# Patient Record
Sex: FEMALE | Race: WHITE | NOT HISPANIC OR LATINO | Employment: OTHER | URBAN - METROPOLITAN AREA
[De-identification: names, ages, dates, MRNs, and addresses within clinical notes are randomized per-mention and may not be internally consistent; named-entity substitution may affect disease eponyms.]

---

## 2018-05-30 ENCOUNTER — HOSPITAL ENCOUNTER (EMERGENCY)
Facility: MEDICAL CENTER | Age: 83
End: 2018-05-30
Attending: EMERGENCY MEDICINE
Payer: COMMERCIAL

## 2018-05-30 VITALS
TEMPERATURE: 98.6 F | SYSTOLIC BLOOD PRESSURE: 154 MMHG | DIASTOLIC BLOOD PRESSURE: 87 MMHG | HEART RATE: 82 BPM | WEIGHT: 97 LBS | BODY MASS INDEX: 17.85 KG/M2 | HEIGHT: 62 IN | OXYGEN SATURATION: 99 % | RESPIRATION RATE: 18 BRPM

## 2018-05-30 DIAGNOSIS — J02.9 PHARYNGITIS, UNSPECIFIED ETIOLOGY: ICD-10-CM

## 2018-05-30 PROCEDURE — 99283 EMERGENCY DEPT VISIT LOW MDM: CPT

## 2018-05-30 RX ORDER — PENICILLIN V POTASSIUM 500 MG/1
500 TABLET ORAL 3 TIMES DAILY
Qty: 30 TAB | Refills: 0 | Status: SHIPPED
Start: 2018-05-30 | End: 2018-10-20 | Stop reason: CLARIF

## 2018-05-30 ASSESSMENT — PAIN SCALES - GENERAL
PAINLEVEL_OUTOF10: 5
PAINLEVEL_OUTOF10: 0

## 2018-05-30 ASSESSMENT — PAIN SCALES - WONG BAKER: WONGBAKER_NUMERICALRESPONSE: DOESN'T HURT AT ALL

## 2018-05-31 NOTE — DISCHARGE INSTRUCTIONS
Pharyngitis  Pharyngitis is redness, pain, and swelling (inflammation) of your pharynx.  What are the causes?  Pharyngitis is usually caused by infection. Most of the time, these infections are from viruses (viral) and are part of a cold. However, sometimes pharyngitis is caused by bacteria (bacterial). Pharyngitis can also be caused by allergies. Viral pharyngitis may be spread from person to person by coughing, sneezing, and personal items or utensils (cups, forks, spoons, toothbrushes). Bacterial pharyngitis may be spread from person to person by more intimate contact, such as kissing.  What are the signs or symptoms?  Symptoms of pharyngitis include:  · Sore throat.  · Tiredness (fatigue).  · Low-grade fever.  · Headache.  · Joint pain and muscle aches.  · Skin rashes.  · Swollen lymph nodes.  · Plaque-like film on throat or tonsils (often seen with bacterial pharyngitis).  How is this diagnosed?  Your health care provider will ask you questions about your illness and your symptoms. Your medical history, along with a physical exam, is often all that is needed to diagnose pharyngitis. Sometimes, a rapid strep test is done. Other lab tests may also be done, depending on the suspected cause.  How is this treated?  Viral pharyngitis will usually get better in 3-4 days without the use of medicine. Bacterial pharyngitis is treated with medicines that kill germs (antibiotics).  Follow these instructions at home:  · Drink enough water and fluids to keep your urine clear or pale yellow.  · Only take over-the-counter or prescription medicines as directed by your health care provider:  ¨ If you are prescribed antibiotics, make sure you finish them even if you start to feel better.  ¨ Do not take aspirin.  · Get lots of rest.  · Gargle with 8 oz of salt water (½ tsp of salt per 1 qt of water) as often as every 1-2 hours to soothe your throat.  · Throat lozenges (if you are not at risk for choking) or sprays may be used to  soothe your throat.  Contact a health care provider if:  · You have large, tender lumps in your neck.  · You have a rash.  · You cough up green, yellow-brown, or bloody spit.  Get help right away if:  · Your neck becomes stiff.  · You drool or are unable to swallow liquids.  · You vomit or are unable to keep medicines or liquids down.  · You have severe pain that does not go away with the use of recommended medicines.  · You have trouble breathing (not caused by a stuffy nose).  This information is not intended to replace advice given to you by your health care provider. Make sure you discuss any questions you have with your health care provider.  Document Released: 12/18/2006 Document Revised: 05/25/2017 Document Reviewed: 08/25/2014  [employer] Interactive Patient Education © 2017 [employer]

## 2018-05-31 NOTE — ED PROVIDER NOTES
"ED Provider Note    CHIEF COMPLAINT  Chief Complaint   Patient presents with   • Sore Throat     Throat pain x 1 hour.  Pt describes \"tasting poison when she swallows\"  Airway is patent, negative for swelling, foreign objects.  Pt is afebrile       HPI  Rosalba Wagner is a 89 y.o. female here for evaluation of a sore throat.  The pt states it happened approximately three hours ago. The pt has no fever, and no issues with swallowing.  She is able to eat/drink as per the usual.  She has no ill contacts.  No ear pain.  She has not taken anything for the pain or discomfort.  She denies any history of the same.  No cp, no sob, no abdominal pain, and no other medical concerns.     PAST MEDICAL HISTORY   has a past medical history of Chest pain, atypical (8/21/2012); Left arm numbness (8/21/2012); OSTEOPOROSIS (8/21/2012); and Syncope (8/21/2012).    SOCIAL HISTORY  Social History     Social History Main Topics   • Smoking status: Former Smoker     Quit date: 9/11/1984   • Smokeless tobacco: Never Used   • Alcohol use No   • Drug use: No   • Sexual activity: Not on file       SURGICAL HISTORY  patient denies any surgical history    CURRENT MEDICATIONS  Home Medications     Reviewed by Marry Baron R.N. (Registered Nurse) on 05/30/18 at 2131  Med List Status: Not Addressed   Medication Last Dose Status   aspirin EC (ECOTRIN) 81 MG TBEC Taking Active   Cholecalciferol (VITAMIN D) 2000 UNITS CAPS  Active   potassium chloride (MICRO-K) 10 MEQ capsule  Active                ALLERGIES  No Known Allergies    REVIEW OF SYSTEMS  See HPI for further details. Review of systems as above, otherwise all other systems are negative.     PHYSICAL EXAM  VITAL SIGNS: BP (!) 167/115   Pulse 78   Temp 36.9 °C (98.5 °F)   Resp 18   Ht 1.575 m (5' 2\")   Wt 44 kg (97 lb)   SpO2 94%   BMI 17.74 kg/m²     Constitutional: Well developed, well nourished. No acute distress.  HEENT: Normocephalic, atraumatic. MMM.  Bilateral TM clear. " Posterior pharynx clear and without exudate, uvula midline,   Neck: Supple, Full range of motion, no anterior LAD.  Chest/Pulmonary:  No respiratory distress.  Equal expansion   Musculoskeletal: No deformity, no edema, neurovascular intact.   Neuro: Clear speech, appropriate, cooperative, cranial nerves II-XII grossly intact.  Psych: Normal mood and affect      PROCEDURES     MEDICAL RECORD  I have reviewed patient's medical record and pertinent results are listed above.    COURSE & MEDICAL DECISION MAKING  I have reviewed any medical record information, laboratory studies and radiographic results as noted above.    I you have had any blood pressure issues while here in the emergency department, please see your doctor for a further evaluation or work up.    The pt is nontoxic appearing and comfortable at this time.  I do not see a reason to do anything at this time, but she will be given prescription for penicillin in case she develops any exudate in the coming days.  She is here with family, and will return here for any other issues.     Differential diagnoses include but not limited to: strep pharyngitis, pta, om,     This patient presents with pharyngitis  .  At this time, I have counseled the patient/family regarding their medications, pain control, and follow up.  They will continue their medications, if any, as prescribed.  They will return immediately for any worsening symptoms and/or any other medical concerns.  They will see their doctor, or contact the doctor provided, in 1-2 days for follow up.       FINAL IMPRESSION  1. Pharyngitis, unspecified etiology            Electronically signed by: Justin Crawley, 5/30/2018 9:40 PM

## 2018-05-31 NOTE — ED NOTES
PT IS AAOX4, PT IS IN NAD, PT IS BEING D/C, PT WAS GIVEN D/C INSTRUCTIONS AND SHE STATED UNDERSTANDING. PT IS ABLE TO AMB WOI ASSISTANCE. PT AIRWAY IS CLEAR. NO SOB. PT LEFT WITH FAMILY.

## 2018-05-31 NOTE — ED TRIAGE NOTES
"Rosalba Wagner  89 y.o. female  Chief Complaint   Patient presents with   • Sore Throat     Throat pain x 1 hour.  Pt describes \"tasting poison when she swallows\"  Airway is patent, negative for swelling, foreign objects.  Pt is afebrile       Pt amb to triage with steady gait for above complaint.  Pt is not in any respiratory distress, states the pain started while she was trying to sleep.  Pt also denies having fevers or chills at home, or recent sinus or throat infections.  \"I think I just need something to help me sleep\"  Pt is alert and oriented, speaking in full sentences, follows commands and responds appropriately to questions. NAD. Resp are even and unlabored.  Pt placed in lobby. Pt educated on triage process. Pt encouraged to alert staff for any changes.    BP (!) 167/115   Pulse 78   Temp 36.9 °C (98.5 °F)   Resp 18   Ht 1.575 m (5' 2\")   Wt 44 kg (97 lb)   SpO2 94%   BMI 17.74 kg/m²     "

## 2018-10-20 ENCOUNTER — APPOINTMENT (OUTPATIENT)
Dept: RADIOLOGY | Facility: MEDICAL CENTER | Age: 83
End: 2018-10-20
Attending: EMERGENCY MEDICINE
Payer: COMMERCIAL

## 2018-10-20 ENCOUNTER — HOSPITAL ENCOUNTER (OUTPATIENT)
Facility: MEDICAL CENTER | Age: 83
End: 2018-10-21
Attending: EMERGENCY MEDICINE | Admitting: HOSPITALIST
Payer: COMMERCIAL

## 2018-10-20 DIAGNOSIS — I48.91 ATRIAL FIBRILLATION, UNSPECIFIED TYPE (HCC): ICD-10-CM

## 2018-10-20 DIAGNOSIS — J02.9 SORE THROAT: ICD-10-CM

## 2018-10-20 PROBLEM — F02.80 LATE ONSET ALZHEIMER'S DISEASE WITHOUT BEHAVIORAL DISTURBANCE (HCC): Status: ACTIVE | Noted: 2018-10-20

## 2018-10-20 PROBLEM — G30.1 LATE ONSET ALZHEIMER'S DISEASE WITHOUT BEHAVIORAL DISTURBANCE (HCC): Status: ACTIVE | Noted: 2018-10-20

## 2018-10-20 PROBLEM — I48.0 PAROXYSMAL ATRIAL FIBRILLATION (HCC): Status: ACTIVE | Noted: 2018-10-20

## 2018-10-20 LAB
ANION GAP SERPL CALC-SCNC: 5 MMOL/L (ref 0–11.9)
BASOPHILS # BLD AUTO: 0.5 % (ref 0–1.8)
BASOPHILS # BLD: 0.04 K/UL (ref 0–0.12)
BNP SERPL-MCNC: 114 PG/ML (ref 0–100)
BUN SERPL-MCNC: 26 MG/DL (ref 8–22)
CA-I SERPL-SCNC: 1.2 MMOL/L (ref 1.1–1.3)
CALCIUM SERPL-MCNC: 9.6 MG/DL (ref 8.5–10.5)
CHLORIDE SERPL-SCNC: 103 MMOL/L (ref 96–112)
CO2 SERPL-SCNC: 28 MMOL/L (ref 20–33)
CREAT SERPL-MCNC: 0.95 MG/DL (ref 0.5–1.4)
EOSINOPHIL # BLD AUTO: 0.18 K/UL (ref 0–0.51)
EOSINOPHIL NFR BLD: 2.3 % (ref 0–6.9)
ERYTHROCYTE [DISTWIDTH] IN BLOOD BY AUTOMATED COUNT: 42.3 FL (ref 35.9–50)
GLUCOSE SERPL-MCNC: 112 MG/DL (ref 65–99)
HCT VFR BLD AUTO: 37.7 % (ref 37–47)
HGB BLD-MCNC: 12.5 G/DL (ref 12–16)
IMM GRANULOCYTES # BLD AUTO: 0.03 K/UL (ref 0–0.11)
IMM GRANULOCYTES NFR BLD AUTO: 0.4 % (ref 0–0.9)
LYMPHOCYTES # BLD AUTO: 1.79 K/UL (ref 1–4.8)
LYMPHOCYTES NFR BLD: 22.5 % (ref 22–41)
MAGNESIUM SERPL-MCNC: 2.3 MG/DL (ref 1.5–2.5)
MCH RBC QN AUTO: 30.9 PG (ref 27–33)
MCHC RBC AUTO-ENTMCNC: 33.2 G/DL (ref 33.6–35)
MCV RBC AUTO: 93.1 FL (ref 81.4–97.8)
MONOCYTES # BLD AUTO: 0.76 K/UL (ref 0–0.85)
MONOCYTES NFR BLD AUTO: 9.6 % (ref 0–13.4)
NEUTROPHILS # BLD AUTO: 5.15 K/UL (ref 2–7.15)
NEUTROPHILS NFR BLD: 64.7 % (ref 44–72)
NRBC # BLD AUTO: 0 K/UL
NRBC BLD-RTO: 0 /100 WBC
PLATELET # BLD AUTO: 170 K/UL (ref 164–446)
PMV BLD AUTO: 9.9 FL (ref 9–12.9)
POTASSIUM SERPL-SCNC: 4.4 MMOL/L (ref 3.6–5.5)
RBC # BLD AUTO: 4.05 M/UL (ref 4.2–5.4)
S PYO AG THROAT QL: NORMAL
SIGNIFICANT IND 70042: NORMAL
SITE SITE: NORMAL
SODIUM SERPL-SCNC: 136 MMOL/L (ref 135–145)
SOURCE SOURCE: NORMAL
T4 FREE SERPL-MCNC: 0.89 NG/DL (ref 0.53–1.43)
TROPONIN I SERPL-MCNC: 0.01 NG/ML (ref 0–0.04)
TSH SERPL DL<=0.005 MIU/L-ACNC: 2.79 UIU/ML (ref 0.38–5.33)
WBC # BLD AUTO: 8 K/UL (ref 4.8–10.8)

## 2018-10-20 PROCEDURE — 87880 STREP A ASSAY W/OPTIC: CPT

## 2018-10-20 PROCEDURE — 99285 EMERGENCY DEPT VISIT HI MDM: CPT

## 2018-10-20 PROCEDURE — 700105 HCHG RX REV CODE 258: Performed by: HOSPITALIST

## 2018-10-20 PROCEDURE — 84443 ASSAY THYROID STIM HORMONE: CPT

## 2018-10-20 PROCEDURE — 83880 ASSAY OF NATRIURETIC PEPTIDE: CPT

## 2018-10-20 PROCEDURE — 83735 ASSAY OF MAGNESIUM: CPT

## 2018-10-20 PROCEDURE — 87081 CULTURE SCREEN ONLY: CPT

## 2018-10-20 PROCEDURE — A9270 NON-COVERED ITEM OR SERVICE: HCPCS | Performed by: EMERGENCY MEDICINE

## 2018-10-20 PROCEDURE — G0378 HOSPITAL OBSERVATION PER HR: HCPCS

## 2018-10-20 PROCEDURE — 85025 COMPLETE CBC W/AUTO DIFF WBC: CPT

## 2018-10-20 PROCEDURE — 93005 ELECTROCARDIOGRAM TRACING: CPT | Performed by: EMERGENCY MEDICINE

## 2018-10-20 PROCEDURE — 84439 ASSAY OF FREE THYROXINE: CPT

## 2018-10-20 PROCEDURE — 80048 BASIC METABOLIC PNL TOTAL CA: CPT

## 2018-10-20 PROCEDURE — 700102 HCHG RX REV CODE 250 W/ 637 OVERRIDE(OP): Performed by: EMERGENCY MEDICINE

## 2018-10-20 PROCEDURE — 82330 ASSAY OF CALCIUM: CPT

## 2018-10-20 PROCEDURE — 71046 X-RAY EXAM CHEST 2 VIEWS: CPT

## 2018-10-20 PROCEDURE — 99220 PR INITIAL OBSERVATION CARE,LEVL III: CPT | Performed by: HOSPITALIST

## 2018-10-20 PROCEDURE — 36415 COLL VENOUS BLD VENIPUNCTURE: CPT

## 2018-10-20 PROCEDURE — 84484 ASSAY OF TROPONIN QUANT: CPT

## 2018-10-20 RX ORDER — POLYETHYLENE GLYCOL 3350 17 G/17G
1 POWDER, FOR SOLUTION ORAL
Status: DISCONTINUED | OUTPATIENT
Start: 2018-10-20 | End: 2018-10-21 | Stop reason: HOSPADM

## 2018-10-20 RX ORDER — ONDANSETRON 2 MG/ML
4 INJECTION INTRAMUSCULAR; INTRAVENOUS EVERY 4 HOURS PRN
Status: DISCONTINUED | OUTPATIENT
Start: 2018-10-20 | End: 2018-10-21 | Stop reason: HOSPADM

## 2018-10-20 RX ORDER — SODIUM CHLORIDE 9 MG/ML
INJECTION, SOLUTION INTRAVENOUS
Status: ACTIVE
Start: 2018-10-20 | End: 2018-10-21

## 2018-10-20 RX ORDER — ONDANSETRON 4 MG/1
4 TABLET, ORALLY DISINTEGRATING ORAL EVERY 4 HOURS PRN
Status: DISCONTINUED | OUTPATIENT
Start: 2018-10-20 | End: 2018-10-21 | Stop reason: HOSPADM

## 2018-10-20 RX ORDER — ACETAMINOPHEN 160 MG/5ML
650 SUSPENSION ORAL ONCE
Status: COMPLETED | OUTPATIENT
Start: 2018-10-20 | End: 2018-10-20

## 2018-10-20 RX ORDER — BISACODYL 10 MG
10 SUPPOSITORY, RECTAL RECTAL
Status: DISCONTINUED | OUTPATIENT
Start: 2018-10-20 | End: 2018-10-21 | Stop reason: HOSPADM

## 2018-10-20 RX ORDER — ACETAMINOPHEN 325 MG/1
650 TABLET ORAL EVERY 6 HOURS PRN
Status: DISCONTINUED | OUTPATIENT
Start: 2018-10-20 | End: 2018-10-21 | Stop reason: HOSPADM

## 2018-10-20 RX ORDER — M-VIT,TX,IRON,MINS/CALC/FOLIC 27MG-0.4MG
1 TABLET ORAL DAILY
COMMUNITY
End: 2019-03-25 | Stop reason: CLARIF

## 2018-10-20 RX ORDER — SODIUM CHLORIDE 9 MG/ML
INJECTION, SOLUTION INTRAVENOUS CONTINUOUS
Status: DISCONTINUED | OUTPATIENT
Start: 2018-10-20 | End: 2018-10-21 | Stop reason: HOSPADM

## 2018-10-20 RX ORDER — AMOXICILLIN 250 MG
2 CAPSULE ORAL 2 TIMES DAILY
Status: DISCONTINUED | OUTPATIENT
Start: 2018-10-21 | End: 2018-10-21 | Stop reason: HOSPADM

## 2018-10-20 RX ADMIN — SODIUM CHLORIDE: 9 INJECTION, SOLUTION INTRAVENOUS at 23:14

## 2018-10-20 RX ADMIN — ACETAMINOPHEN 650 MG: 160 SUSPENSION ORAL at 18:41

## 2018-10-20 ASSESSMENT — COGNITIVE AND FUNCTIONAL STATUS - GENERAL
DAILY ACTIVITIY SCORE: 23
TURNING FROM BACK TO SIDE WHILE IN FLAT BAD: A LITTLE
MOVING TO AND FROM BED TO CHAIR: A LITTLE
WALKING IN HOSPITAL ROOM: A LITTLE
CLIMB 3 TO 5 STEPS WITH RAILING: A LITTLE
SUGGESTED CMS G CODE MODIFIER DAILY ACTIVITY: CI
TOILETING: A LITTLE
SUGGESTED CMS G CODE MODIFIER MOBILITY: CK
MOVING FROM LYING ON BACK TO SITTING ON SIDE OF FLAT BED: A LITTLE
MOBILITY SCORE: 19

## 2018-10-20 ASSESSMENT — LIFESTYLE VARIABLES
EVER_SMOKED: YES
ALCOHOL_USE: NO

## 2018-10-20 ASSESSMENT — PATIENT HEALTH QUESTIONNAIRE - PHQ9
7. TROUBLE CONCENTRATING ON THINGS, SUCH AS READING THE NEWSPAPER OR WATCHING TELEVISION: SEVERAL DAYS
1. LITTLE INTEREST OR PLEASURE IN DOING THINGS: NOT AT ALL
2. FEELING DOWN, DEPRESSED, IRRITABLE, OR HOPELESS: SEVERAL DAYS
5. POOR APPETITE OR OVEREATING: NOT AT ALL
4. FEELING TIRED OR HAVING LITTLE ENERGY: NOT AT ALL
3. TROUBLE FALLING OR STAYING ASLEEP OR SLEEPING TOO MUCH: SEVERAL DAYS
SUM OF ALL RESPONSES TO PHQ QUESTIONS 1-9: 4
8. MOVING OR SPEAKING SO SLOWLY THAT OTHER PEOPLE COULD HAVE NOTICED. OR THE OPPOSITE, BEING SO FIGETY OR RESTLESS THAT YOU HAVE BEEN MOVING AROUND A LOT MORE THAN USUAL: NOT AT ALL
6. FEELING BAD ABOUT YOURSELF - OR THAT YOU ARE A FAILURE OR HAVE LET YOURSELF OR YOUR FAMILY DOWN: SEVERAL DAYS
SUM OF ALL RESPONSES TO PHQ9 QUESTIONS 1 AND 2: 1
9. THOUGHTS THAT YOU WOULD BE BETTER OFF DEAD, OR OF HURTING YOURSELF: NOT AT ALL

## 2018-10-20 ASSESSMENT — ENCOUNTER SYMPTOMS
RESPIRATORY NEGATIVE: 1
MUSCULOSKELETAL NEGATIVE: 1
CARDIOVASCULAR NEGATIVE: 1
PSYCHIATRIC NEGATIVE: 1
NEUROLOGICAL NEGATIVE: 1
GASTROINTESTINAL NEGATIVE: 1
CONSTITUTIONAL NEGATIVE: 1
EYES NEGATIVE: 1

## 2018-10-20 ASSESSMENT — COPD QUESTIONNAIRES
DO YOU EVER COUGH UP ANY MUCUS OR PHLEGM?: NO/ONLY WITH OCCASIONAL COLDS OR INFECTIONS
COPD SCREENING SCORE: 4
IN THE PAST 12 MONTHS DO YOU DO LESS THAN YOU USED TO BECAUSE OF YOUR BREATHING PROBLEMS: AGREE
HAVE YOU SMOKED AT LEAST 100 CIGARETTES IN YOUR ENTIRE LIFE: NO/DON'T KNOW
DURING THE PAST 4 WEEKS HOW MUCH DID YOU FEEL SHORT OF BREATH: SOME OF THE TIME

## 2018-10-20 ASSESSMENT — PAIN SCALES - GENERAL: PAINLEVEL_OUTOF10: 4

## 2018-10-21 VITALS
WEIGHT: 98.55 LBS | RESPIRATION RATE: 18 BRPM | HEIGHT: 62 IN | SYSTOLIC BLOOD PRESSURE: 162 MMHG | DIASTOLIC BLOOD PRESSURE: 69 MMHG | TEMPERATURE: 97.6 F | OXYGEN SATURATION: 98 % | HEART RATE: 76 BPM | BODY MASS INDEX: 18.13 KG/M2

## 2018-10-21 PROBLEM — Z66 DNR (DO NOT RESUSCITATE): Status: ACTIVE | Noted: 2018-10-21

## 2018-10-21 LAB
ANION GAP SERPL CALC-SCNC: 6 MMOL/L (ref 0–11.9)
BASOPHILS # BLD AUTO: 0.8 % (ref 0–1.8)
BASOPHILS # BLD: 0.05 K/UL (ref 0–0.12)
BUN SERPL-MCNC: 22 MG/DL (ref 8–22)
CALCIUM SERPL-MCNC: 9.2 MG/DL (ref 8.5–10.5)
CHLORIDE SERPL-SCNC: 108 MMOL/L (ref 96–112)
CO2 SERPL-SCNC: 27 MMOL/L (ref 20–33)
CREAT SERPL-MCNC: 0.77 MG/DL (ref 0.5–1.4)
EOSINOPHIL # BLD AUTO: 0.23 K/UL (ref 0–0.51)
EOSINOPHIL NFR BLD: 3.9 % (ref 0–6.9)
ERYTHROCYTE [DISTWIDTH] IN BLOOD BY AUTOMATED COUNT: 40.9 FL (ref 35.9–50)
GLUCOSE SERPL-MCNC: 96 MG/DL (ref 65–99)
HCT VFR BLD AUTO: 37 % (ref 37–47)
HGB BLD-MCNC: 12.6 G/DL (ref 12–16)
IMM GRANULOCYTES # BLD AUTO: 0.02 K/UL (ref 0–0.11)
IMM GRANULOCYTES NFR BLD AUTO: 0.3 % (ref 0–0.9)
LYMPHOCYTES # BLD AUTO: 1.75 K/UL (ref 1–4.8)
LYMPHOCYTES NFR BLD: 29.4 % (ref 22–41)
MCH RBC QN AUTO: 31.3 PG (ref 27–33)
MCHC RBC AUTO-ENTMCNC: 34.1 G/DL (ref 33.6–35)
MCV RBC AUTO: 91.8 FL (ref 81.4–97.8)
MONOCYTES # BLD AUTO: 0.68 K/UL (ref 0–0.85)
MONOCYTES NFR BLD AUTO: 11.4 % (ref 0–13.4)
NEUTROPHILS # BLD AUTO: 3.22 K/UL (ref 2–7.15)
NEUTROPHILS NFR BLD: 54.2 % (ref 44–72)
NRBC # BLD AUTO: 0 K/UL
NRBC BLD-RTO: 0 /100 WBC
PLATELET # BLD AUTO: 153 K/UL (ref 164–446)
PMV BLD AUTO: 9.5 FL (ref 9–12.9)
POTASSIUM SERPL-SCNC: 4.3 MMOL/L (ref 3.6–5.5)
RBC # BLD AUTO: 4.03 M/UL (ref 4.2–5.4)
SODIUM SERPL-SCNC: 141 MMOL/L (ref 135–145)
TROPONIN I SERPL-MCNC: 0.01 NG/ML (ref 0–0.04)
WBC # BLD AUTO: 6 K/UL (ref 4.8–10.8)

## 2018-10-21 PROCEDURE — G0378 HOSPITAL OBSERVATION PER HR: HCPCS

## 2018-10-21 PROCEDURE — 85025 COMPLETE CBC W/AUTO DIFF WBC: CPT

## 2018-10-21 PROCEDURE — 36415 COLL VENOUS BLD VENIPUNCTURE: CPT

## 2018-10-21 PROCEDURE — 99217 PR OBSERVATION CARE DISCHARGE: CPT | Mod: 25 | Performed by: INTERNAL MEDICINE

## 2018-10-21 PROCEDURE — 700102 HCHG RX REV CODE 250 W/ 637 OVERRIDE(OP): Performed by: HOSPITALIST

## 2018-10-21 PROCEDURE — 84484 ASSAY OF TROPONIN QUANT: CPT

## 2018-10-21 PROCEDURE — 700111 HCHG RX REV CODE 636 W/ 250 OVERRIDE (IP): Performed by: HOSPITALIST

## 2018-10-21 PROCEDURE — 99497 ADVNCD CARE PLAN 30 MIN: CPT | Performed by: INTERNAL MEDICINE

## 2018-10-21 PROCEDURE — 96372 THER/PROPH/DIAG INJ SC/IM: CPT

## 2018-10-21 PROCEDURE — 80048 BASIC METABOLIC PNL TOTAL CA: CPT

## 2018-10-21 PROCEDURE — A9270 NON-COVERED ITEM OR SERVICE: HCPCS | Performed by: HOSPITALIST

## 2018-10-21 RX ORDER — HALOPERIDOL 0.5 MG/1
0.5 TABLET ORAL 2 TIMES DAILY PRN
Status: DISCONTINUED | OUTPATIENT
Start: 2018-10-21 | End: 2018-10-21 | Stop reason: HOSPADM

## 2018-10-21 RX ADMIN — SENNOSIDES AND DOCUSATE SODIUM 2 TABLET: 8.6; 5 TABLET ORAL at 05:24

## 2018-10-21 RX ADMIN — ENOXAPARIN SODIUM 30 MG: 100 INJECTION SUBCUTANEOUS at 05:24

## 2018-10-21 ASSESSMENT — PAIN SCALES - GENERAL
PAINLEVEL_OUTOF10: 4
PAINLEVEL_OUTOF10: 0
PAINLEVEL_OUTOF10: 0

## 2018-10-21 NOTE — CARE PLAN
Problem: Communication  Goal: The ability to communicate needs accurately and effectively will improve  Outcome: PROGRESSING AS EXPECTED  Pt educated on POC and medications. Pt verbalized understanding.     Problem: Safety  Goal: Will remain free from injury  Outcome: PROGRESSING AS EXPECTED  Bedside table and call light are within reach. Bed is locked and in the lowest position. Treaded socks are on. Patient educated to call for assistance.  Bed & strip alarm on.

## 2018-10-21 NOTE — CARE PLAN
Problem: Knowledge Deficit  Goal: Knowledge of disease process/condition, treatment plan, diagnostic tests, and medications will improve  Outcome: PROGRESSING AS EXPECTED    Intervention: Explain information regarding disease process/condition, treatment plan, diagnostic tests, and medications and document in education  Discussed POC and upcoming diagnostic tests with pt. Asked appropriate questions. Verbalized understanding      Problem: Pain Management  Goal: Pain level will decrease to patient's comfort goal  Pt currently at comfort function goal. Pt not currently experiencing any acute discomfort.

## 2018-10-21 NOTE — PROGRESS NOTES
Patient discharged home with friend. All personal belongings collected. IV access removed. Monitor removed, monitor room notified. Discharge instructions discussed. Medications reviewed. Follow up appointments to be scheduled by patient. Patient ambulated off unit with 4-wheel walker, all personal belongings, and friend with escourt without incident.

## 2018-10-21 NOTE — PROGRESS NOTES
2 RN skin assessment with TERRY Bradshaw:  Pt has some general bruising on left arm. BL feet are dry. Otherwise, skin is intact.

## 2018-10-21 NOTE — H&P
Hospital Medicine History & Physical Note    Date of Service  10/20/2018    Primary Care Physician  Pcp Pt States None    Consultants  None    Code Status  Full code     Chief Complaint  Sore throat    History of Presenting Illness  89 y.o. female with apparently no prior medical history, but with evidence of dementia, without behavioral disturbance, was in her usual state of health until a few days prior to admission.  She began to have a sore throat.  This was not associated with any fever or chills, with no exacerbating or alleviating factors.  She subsequently presented to evaluate the cause of her sore throat.  On routine monitoring, she was noted to have an irregular heartbeat.  She currently denies any chest pain or shortness of breath, no fever or chills, no other complaints.    Review of Systems  Review of Systems   Constitutional: Negative.    HENT: Negative.    Eyes: Negative.    Respiratory: Negative.    Cardiovascular: Negative.    Gastrointestinal: Negative.    Genitourinary: Negative.    Musculoskeletal: Negative.    Skin: Negative.    Neurological: Negative.    Endo/Heme/Allergies: Negative.    Psychiatric/Behavioral: Negative.        Past Medical History   has a past medical history of Chest pain, atypical (8/21/2012); Dementia; Left arm numbness (8/21/2012); OSTEOPOROSIS (8/21/2012); and Syncope (8/21/2012).    Surgical History   has no past surgical history on file.     Family History  family history includes No Known Problems in her father and mother.     Social History   reports that she quit smoking about 34 years ago. She has never used smokeless tobacco. She reports that she does not drink alcohol or use drugs.    Allergies  No Known Allergies    Medications  Prior to Admission Medications   Prescriptions Last Dose Informant Patient Reported? Taking?   Cholecalciferol (VITAMIN D) 2000 UNITS CAPS   No No   Sig: Take 1 Cap by mouth 2 Times a Day.   aspirin EC (ECOTRIN) 81 MG TBEC not taking   Yes No   Sig: Take 81 mg by mouth every day.   penicillin v potassium (VEETID) 500 MG Tab   No No   Sig: Take 1 Tab by mouth 3 times a day.   potassium chloride (MICRO-K) 10 MEQ capsule   No No   Sig: Take 1 Cap by mouth 2 times a day.      Facility-Administered Medications: None       Physical Exam  Temp:  [36.7 °C (98.1 °F)] 36.7 °C (98.1 °F)  Pulse:  [] 76  Resp:  [16] 16  BP: (184)/(76) 184/76    Physical Exam   Constitutional: She appears well-developed and well-nourished. No distress.   HENT:   Head: Normocephalic and atraumatic.   Eyes: Pupils are equal, round, and reactive to light. Conjunctivae are normal.   Neck: Normal range of motion. Neck supple. No tracheal deviation present. No thyromegaly present.   What appears to be a prior thyroid surgery scar   Cardiovascular: Normal rate, regular rhythm and normal heart sounds.  Exam reveals no gallop and no friction rub.    No murmur heard.  Pulmonary/Chest: Effort normal and breath sounds normal. No respiratory distress. She has no wheezes. She has no rales.   Abdominal: Soft. Bowel sounds are normal. She exhibits no distension. There is no tenderness. There is no rebound.   Musculoskeletal: Normal range of motion. She exhibits no edema.   Lymphadenopathy:     She has no cervical adenopathy.   Neurological: She is alert. No cranial nerve deficit.   Skin: Skin is warm and dry. She is not diaphoretic.   Psychiatric: She has a normal mood and affect.   Nursing note and vitals reviewed.      Laboratory:  Recent Labs      10/20/18   1925   WBC  8.0   RBC  4.05*   HEMOGLOBIN  12.5   HEMATOCRIT  37.7   MCV  93.1   MCH  30.9   MCHC  33.2*   RDW  42.3   PLATELETCT  170   MPV  9.9     Recent Labs      10/20/18   1925   SODIUM  136   POTASSIUM  4.4   CHLORIDE  103   CO2  28   GLUCOSE  112*   BUN  26*   CREATININE  0.95   CALCIUM  9.6     Recent Labs      10/20/18   1925   GLUCOSE  112*         Recent Labs      10/20/18   1925   BNPBTYPENAT  114*         Recent Labs       10/20/18   1925   TROPONINI  0.01       Urinalysis:    No results found     Imaging:  DX-CHEST-2 VIEWS   Final Result      1.  COPD changes.      2.  Biapical pulmonary scarring.            Assessment/Plan:  I anticipate this patient is appropriate for observation status at this time.    * Paroxysmal atrial fibrillation (HCC)   Assessment & Plan    Unclear if previously diagnosed.  Patient and family member/friend not aware of diagnosis in past.  Currently rate is controlled.  As no other medical conditions, CHADS2 score currently 1.  May not need anticoagulation, ?ability to comply at any rate.  Check echocardiogram, TSH normal, will check FT4 level        Late onset Alzheimer's disease without behavioral disturbance   Assessment & Plan    Stable.  Memory deficits prominent.          Sore throat   Assessment & Plan    Likely viral.  Supportive care as needed.             VTE prophylaxis: SCD, lovenox

## 2018-10-21 NOTE — DISCHARGE PLANNING
Medical Social Work    Anticipated Discharge Disposition: Home     Action: LSW reviewed pt's chart and pt has not received any transportation assistance in the past.  LSW provided cab voucher (#[id number]) to get pt to her residence at [address] [place] [address].     Barriers: None     Plan: Pt to d/c home with cab voucher, no needs

## 2018-10-21 NOTE — ED PROVIDER NOTES
ED Provider Note    Scribed for Tomer Martin M.D. by Cheryl Maciel. 10/20/2018  6:21 PM    Means of arrival: Walk-in  History obtained from: Patient   History limited by: none    CHIEF COMPLAINT  Chief Complaint   Patient presents with   • Sore Throat     since 1700 today       [facility]    Rosalba Wagner is a 89 y.o. female presenting with a sore throat that began one hour ago. Patient's pain is exacerbated with swallowing, she does not have alleviating factors of her pain. Her pain is an 8 out of 10. She is able to drink liquids. Patient does not have a fever, cough chest pain, shortness of breath, vomiting, neck stiffness or diarrhea associated. She has not had sick contacts. Patient lives alone.     REVIEW OF SYSTEMS  See HPI for further details.   Pertinent positives include: sore throat  Pertinent negative include: fever, cough chest pain, shortness of breath, vomiting, neck stiffness or diarrhea  10 + review of systems otherwise negative   C.    PAST MEDICAL HISTORY   has a past medical history of Chest pain, atypical (8/21/2012); Left arm numbness (8/21/2012); OSTEOPOROSIS (8/21/2012); and Syncope (8/21/2012).    SOCIAL HISTORY  Social History     Social History Main Topics   • Smoking status: Former Smoker     Quit date: 9/11/1984   • Smokeless tobacco: Never Used   • Alcohol use No   • Drug use: No       SURGICAL HISTORY  patient denies any surgical history    CURRENT MEDICATIONS  Home Medications     Reviewed by Lilliam Ribera R.N. (Registered Nurse) on 10/20/18 at 1746  Med List Status: Partial   Medication Last Dose Status   aspirin EC (ECOTRIN) 81 MG TBEC not taking Active   Cholecalciferol (VITAMIN D) 2000 UNITS CAPS  Active   penicillin v potassium (VEETID) 500 MG Tab  Active   potassium chloride (MICRO-K) 10 MEQ capsule  Active                ALLERGIES  No Known Allergies    PHYSICAL EXAM  VITAL SIGNS: BP (!) 184/76   Pulse 79   Temp 36.7 °C (98.1 °F) (Temporal)   Resp 16    "Ht 1.575 m (5' 2\")   Wt 45.8 kg (100 lb 15.5 oz)   SpO2 93%   BMI 18.47 kg/m²    SpO2: I interpret this pulse oximetry as normal  Constitutional: Well developed, Well nourished, no distress, Non-toxic appearance.   HENT: Normocephalic, Atraumatic, Bilateral external ears normal, Oropharynx moist with no oral exudates, no tonsillar edema or erythema. uvula is midline. trachea is midline,   Eyes: PERRLA, EOMI, Conjunctiva normal, No discharge.   Neck: No tenderness, Supple, No stridor.   Lymphatic: No lymphadenopathy noted.   Cardiovascular: Normal heart rate, Irregular rhythm.  No peripheral edema, no calf tenderness or swelling.  Thorax & Lungs: Clear to auscultation bilaterally, No respiratory distress, No wheezing, No crackles.   Abdomen: Soft, No tenderness, No masses, No pulsatile masses.   Skin: Warm, Dry, No erythema, No rash.   Extremities:, No edema No cyanosis.   Musculoskeletal: No tenderness to palpation or major deformities noted.  Intact distal pulses  Neurologic: Awake, alert. Moves all extremities spontaneously.  Psychiatric: Affect normal, Judgment normal, Mood normal.     DIAGNOSTIC STUDIES / PROCEDURES    EKG  EKG (my interpretation): Irregular rhythm, rate 100, axis normal, no ST or T-wave abnormalities, atrial fibrillation, no prior EKGs immediately available for comparison.  Relevant clinical issues: Sore throat, irregular heart rhythm    LABORATORY  Results for orders placed or performed during the hospital encounter of 10/20/18   RAPID STREP, CULT IF INDICATED (CULTURE IF NEGATIVE)   Result Value Ref Range    Significant Indicator NEG     Source THRT     Site THROAT     Rapid Strep Screen       Negative for Group A streptococcus.  A negative result may be obtained if the specimen is  inadequate or antigen concentration is below the  sensitivity of the test. This negative test will be followed  up with a culture as requested.     CBC WITH DIFFERENTIAL   Result Value Ref Range    WBC 8.0 4.8 " - 10.8 K/uL    RBC 4.05 (L) 4.20 - 5.40 M/uL    Hemoglobin 12.5 12.0 - 16.0 g/dL    Hematocrit 37.7 37.0 - 47.0 %    MCV 93.1 81.4 - 97.8 fL    MCH 30.9 27.0 - 33.0 pg    MCHC 33.2 (L) 33.6 - 35.0 g/dL    RDW 42.3 35.9 - 50.0 fL    Platelet Count 170 164 - 446 K/uL    MPV 9.9 9.0 - 12.9 fL    Neutrophils-Polys 64.70 44.00 - 72.00 %    Lymphocytes 22.50 22.00 - 41.00 %    Monocytes 9.60 0.00 - 13.40 %    Eosinophils 2.30 0.00 - 6.90 %    Basophils 0.50 0.00 - 1.80 %    Immature Granulocytes 0.40 0.00 - 0.90 %    Nucleated RBC 0.00 /100 WBC    Neutrophils (Absolute) 5.15 2.00 - 7.15 K/uL    Lymphs (Absolute) 1.79 1.00 - 4.80 K/uL    Monos (Absolute) 0.76 0.00 - 0.85 K/uL    Eos (Absolute) 0.18 0.00 - 0.51 K/uL    Baso (Absolute) 0.04 0.00 - 0.12 K/uL    Immature Granulocytes (abs) 0.03 0.00 - 0.11 K/uL    NRBC (Absolute) 0.00 K/uL   BASIC METABOLIC PANEL   Result Value Ref Range    Sodium 136 135 - 145 mmol/L    Potassium 4.4 3.6 - 5.5 mmol/L    Chloride 103 96 - 112 mmol/L    Co2 28 20 - 33 mmol/L    Glucose 112 (H) 65 - 99 mg/dL    Bun 26 (H) 8 - 22 mg/dL    Creatinine 0.95 0.50 - 1.40 mg/dL    Calcium 9.6 8.5 - 10.5 mg/dL    Anion Gap 5.0 0.0 - 11.9   TROPONIN   Result Value Ref Range    Troponin I 0.01 0.00 - 0.04 ng/mL   BTYPE NATRIURETIC PEPTIDE   Result Value Ref Range    B Natriuretic Peptide 114 (H) 0 - 100 pg/mL   MAGNESIUM   Result Value Ref Range    Magnesium 2.3 1.5 - 2.5 mg/dL   TSH   Result Value Ref Range    TSH 2.790 0.380 - 5.330 uIU/mL   IONIZED CALCIUM   Result Value Ref Range    Ionized Calcium 1.2 1.1 - 1.3 mmol/L   ESTIMATED GFR   Result Value Ref Range    GFR If African American >60 >60 mL/min/1.73 m 2    GFR If Non African American 54 (A) >60 mL/min/1.73 m 2   EKG (NOW)   Result Value Ref Range    Report       [facility]    Test Date:  2018-10-20  Pt Name:    MOISESLONA ARMENTA                Department: ER  MRN:        [id number]                      Room:         59  Gender:     Female                       Technician: [id number]  :        1920                   Requested By:MELLO MUÑIZ  Order #:    [id number]                    Reading MD:    Measurements  Intervals                                Axis  Rate:       100                          P:  WY:                                      QRS:        16  QRSD:       76                           T:          20  QT:         364  QTc:        470    Interpretive Statements  ATRIAL FLUTTER, A-RATE 227  CONSIDER POSTERIOR INFARCT  BASELINE WANDER IN LEAD(S) V1  No previous ECG available for comparison     EKG (Now)   Result Value Ref Range    Report       [facility] Emergency Dept[facility]    Test Date:  2018-10-20  Pt Name:    MOISES ARMENTA                Department: ER  MRN:        [id number]                      Room:       [facility]  Gender:     Female                       Technician: [id number]  :        1920                   Requested By:MELLO MUÑIZ  Order #:    [id number]                    Reading MD:    Measurements  Intervals                                Axis  Rate:       81                           P:          0  WY:         173                          QRS:        21  QRSD:       78                           T:          13  QT:         360  QTc:        418    Interpretive Statements  SINUS RHYTHM  MULTIPLE ATRIAL PREMATURE COMPLEXES  Compared to ECG 10/20/2018 18:37:08  Atrial premature complex(es) now present  Atrial flutter no longer present  Myocardial infarct finding no longer present       RADIOLOGY    Dx-chest-2 Views    Result Date: 10/20/2018  10/20/2018 7:01 PM HISTORY/REASON FOR EXAM: Shortness of breath and cough TECHNIQUE/EXAM DESCRIPTION AND NUMBER OF VIEWS: Two views of the chest. COMPARISON: None. FINDINGS: The lungs are hyperinflated and there is interstitial prominence. There is biapical scarring. The heart is normal in size. There is no evidence of pleural effusion. There is  "atherosclerotic vascular calcification.     1.  COPD changes. 2.  Biapical pulmonary scarring.    Chest XR, 1 view (my read): No focal infiltrates or large effusion.  Normal mediastinum. No prior films were immediately available for comparison.  Impression: Normal chest x-ray    CHART REVIEW  Pertinent medical chart information was reviewed and reveals: No recent pertinent visits, seen for similar complaint 5 months ago and discharged with pharyngitis diagnosis    COURSE & MEDICAL DECISION MAKING  Pertinent Labs & Imaging studies reviewed. (See chart for details)    Differential diagnoses include but not limited to: Viral URI, pharyngitis, strep throat, abscess, influenza, atrial fibrillation, electrolyte abnormality    6:21 PM - Patient seen and examined at bedside. Discussed plan of care, including treatment with 650mg tylenol and ordering and labs to  Further evaluate. Patient agrees to the plan of care. The patient will be medicated with 650mg Tylenol. Ordered for EKG, rapid strep to evaluate her symptoms.     6:58 PM EKG, TSH, Magnesium, DX-chest, BNP, Troponin, BMP, CBC.     7:04 PM Multiple EKG's concerning for new onset Atrial Fibrillation, patient is aware. She is asymptomatic and her rate is controlled at this time.     7:25 PM Ordered estimated GFR and ionized calcium.     8:57 PM Discussed with Dr. Henderson, consult for medicine, will admit patient for atrial fibrillation.      Vitals:    10/20/18 1744 10/20/18 1809 10/20/18 1816 10/20/18 1900   BP:       Pulse:  97 79 92   Resp:       Temp:       TempSrc:       SpO2:  93% 93% 95%   Weight: 45.8 kg (100 lb 15.5 oz)      Height: 1.575 m (5' 2\")          Medications   acetaminophen (TYLENOL) oral suspension 650 mg (650 mg Oral Given 10/20/18 1841)       89-year-old female presenting for sore throat.  Initial vital signs showed hypertension tachycardia, however repeat vital signs normal.  Afebrile.  Exam reassuring, no evidence of serious throat infection or " abscess.  Exam however did reveal irregular heartbeat.  Monitor appears to have atrial fibrillation.  EKG order, had run of irregular rhythm, repeat EKG concerning for atrial fibrillation, also shown on rhythm strip.  Patient does not have any history of atrial fibrillation, therefore continued with full workup.  Labs unremarkable, chest x-ray without any concerning findings.  No clinical signs of heart failure, no chest pain or signs or symptoms of ACS.  Also nothing to suggest thromboembolism at this time. Patient has a CHADSVASC of 3 and has a HAS-BLED of 1.  No indications for emergent anticoagulation or rhythm treatment, hemodynamic is stable and unclear onset of atrial fibrillation, unsafe to cardiovert at this time.  With new onset atrial fibrillation in this 89-year-old otherwise healthy female, will admit for observation and further evaluation.  Consulted Dr. Strong for admission.  Patient is hemodynamically stable and comfortable at time of admission.        DISPOSITION  Admitted to medicine in stable condition    FINAL IMPRESSION  Visit Diagnoses     ICD-10-CM   1. Atrial fibrillation, unspecified type (HCC) I48.91   2. Sore throat J02.9        Cheryl NAVARRETE (Scribe), am scribing for, and in the presence of, Tomer Martin M.D..    Electronically signed by: Cheryl Maciel (Scribe), 10/20/2018    Tomer NAVARRETE M.D. personally performed the services described in this documentation, as scribed by Cheryl Maciel in my presence, and it is both accurate and complete.    The note accurately reflects work and decisions made by me.  Tomer Martin  10/20/2018  10:07 PM

## 2018-10-21 NOTE — PROGRESS NOTES
Assumed care of patient at bedside report from NOC RN. Updated on POC. Patient currently sleeping without signs or symptoms of discomfort or distress; on room air; up x1 assist with a walker, personal walker from home available. Call light within reach. Fall precautions in place. Bed locked and in lowest position. All questions answered. No other needs indicated at this time.

## 2018-10-21 NOTE — ASSESSMENT & PLAN NOTE
Unclear if previously diagnosed.  Patient and family member/friend not aware of diagnosis in past.  Currently rate is controlled.  As no other medical conditions, CHADS2 score currently 1.  May not need anticoagulation, ?ability to comply at any rate.  Check echocardiogram, TSH normal, will check FT4 level

## 2018-10-21 NOTE — DISCHARGE SUMMARY
Discharge Summary    CHIEF COMPLAINT ON ADMISSION  Chief Complaint   Patient presents with   • Sore Throat     since 1700 today       Reason for Admission  Sore Throat     Admission Date  10/20/2018    CODE STATUS  DNAR/DNI    HPI & HOSPITAL COURSE  This is a 89 y.o. female here with complaint of sore throat.  Patient was then monitored in the hospital and noticed to have very short period of time of atrial fibrillation from EKG last for 3-5 seconds.   patient is asymptomatic for those findings.  Patient was then monitored in the hospital overnight with a telemetry and that resolved showing baseline rhythm is sinus rhythm without significant arrhythmia.  Patient remained asymptomatic.  Patient function capacity is at baseline.  Patient is ready to be discharged home and follow-up with PCP.  Consider patient's age, she is not a good candidate for long-term use of for an echo ablation therapy.  Patient's dull throat also resolved.  Strep throat test negative.  Patient remained afebrile and no leukocytosis.  Discussed patient case about CODE STATUS and confirmed DNR/DNI       Therefore, she is discharged in good and stable condition to home with close outpatient follow-up.    The patient recovered much more quickly than anticipated on admission.    Discharge Date  10/21/2018    FOLLOW UP ITEMS POST DISCHARGE  none    DISCHARGE DIAGNOSES      Paroxysmal atrial fibrillation (HCC) POA: Yes    Sore throat POA: Yes    Late onset Alzheimer's disease without behavioral disturbance POA: Yes      FOLLOW UP  PCP as needed  No follow-up provider specified.    MEDICATIONS ON DISCHARGE     Medication List      CONTINUE taking these medications      Instructions   NON SPECIFIED   Place 1 Drop in left eye every day. RX Eye drop Unknown Name and Strength  Dose:  1 Drop     therapeutic multivitamin-minerals Tabs   Take 1 Tab by mouth every day.  Dose:  1 Tab        STOP taking these medications    ADVIL -25 MG Caps  Generic drug:   Ibuprofen-Diphenhydramine HCl            Allergies  No Known Allergies    DIET  Orders Placed This Encounter   Procedures   • Diet Order Regular     Standing Status:   Standing     Number of Occurrences:   1     Order Specific Question:   Diet:     Answer:   Regular [1]       ACTIVITY  As tolerated.  Weight bearing as tolerated    CONSULTATIONS  none    PROCEDURES  None    DX-CHEST-2 VIEWS   Final Result      1.  COPD changes.      2.  Biapical pulmonary scarring.        PE:  Gen: AAOx3, NAD  Eyes: PELLA  Neck: no JVD, no lymphadenopathy  Cardia: RRR, no mrg  Lungs: CTAB, no rales, rhonci or wheezing  Abd: NABS, soft, non extended, no mass  EXT: No C/C/E, peripheral pulse 2+ b/l  Neuro: CN II-XII intact, non focal, reflex 2+ symmetrical  Skin: Intact, no lesion, warm  Psych: Appropriate.      LABORATORY  Lab Results   Component Value Date    SODIUM 141 10/21/2018    POTASSIUM 4.3 10/21/2018    CHLORIDE 108 10/21/2018    CO2 27 10/21/2018    GLUCOSE 96 10/21/2018    BUN 22 10/21/2018    CREATININE 0.77 10/21/2018        Lab Results   Component Value Date    WBC 6.0 10/21/2018    HEMOGLOBIN 12.6 10/21/2018    HEMATOCRIT 37.0 10/21/2018    PLATELETCT 153 (L) 10/21/2018        Total time of the discharge process exceeds 37 minutes excluding time spent on advanced care discussion.    Total time spent on advanced care planning, excluding time spent on daily care: 12 minutes.    1st 30 minutes [id number]     I discussed extensively with patient and patient's family is regarding code status and plan of care. Confirmed with DNR    .

## 2018-10-21 NOTE — PROGRESS NOTES
Per pt, she is using an old eye drop rx. She added, using it when she remembers. Per pharmacy, pt filled [facility] on 1/2018.

## 2018-10-21 NOTE — ED TRIAGE NOTES
".  Chief Complaint   Patient presents with   • Sore Throat     since 1700 today     .BP (!) 184/76   Pulse (!) 118   Temp 36.7 °C (98.1 °F) (Temporal)   Resp 16   Ht 1.575 m (5' 2\")   Wt 45.8 kg (100 lb 15.5 oz)   SpO2 96%   BMI 18.47 kg/m²      Ambulatory to triage with above complaints, educated on triage process, placed in lobby, told to inform staff of any changes in condition.        "

## 2018-10-21 NOTE — PROGRESS NOTES
Called [facility] @ [phone], had pharmacy do a cental search pt has not had any eye drops fill since 1/2018.  Will have swing follow up with pt, to see if she goes to another pharmacy.  Pts pharmacy in the hospital is closed.

## 2018-10-21 NOTE — PROGRESS NOTES
Pt brought up to room from ED ([address]) arrived to room via rSherman. Pt's neighbor walked up to the floor with us.  Monitor placed and monitor room notified; pt able to ambulate from gurney to bed with a hand held assist. Pt did bring her 4 wheeled walker to hospital and is at bedside. Pt has purse, sunglasses, clothes and shoes that she came in with at bedside. Pt complaining that she does not have her glasses; pt and RN searched through her belongings that she came up with and were not able to find it. This RN went to room that pt was in previously, ED- [address] 59, search the room and talked to previous RN, Juan but still not able to locate glasses. Will, RN states that he did not see pt's glasses nor did he remove them from her.  Pt's neighbor, Carissa, who helped to walk her to the hospital, was called and stated that pt did not wear her regular glasses into the hospital. Carissa stated that she only wore her sunglasses. Explained this to pt and she stated that she usually always wears her glasses, even under her sunglasses. Pt states that someone downstairs in the ED removed her glasses and she doesn't know where they placed them. This RN looked in the bag containing the sunglasses again and was not able to locate them. POC discussed with pt; all questions answered at this time. Pt is on 2L O2. AOx4.    (No cell phone, electronics, or glasses with pt when she arrived on to the floor.)

## 2018-10-21 NOTE — ED NOTES
Med Rec Updated PARTIALLY per Pt at bedside  Allergies Reviewed  No PO ABX last 30 days    Pt states she takes something for Glaucoma in her left eye, but does not know the name of it.     Tech to follow up in AM with Home Pharmacy.

## 2018-10-22 LAB
S PYO SPEC QL CULT: NORMAL
SIGNIFICANT IND 70042: NORMAL
SITE SITE: NORMAL
SOURCE SOURCE: NORMAL

## 2018-10-23 ENCOUNTER — PATIENT OUTREACH (OUTPATIENT)
Dept: HEALTH INFORMATION MANAGEMENT | Facility: OTHER | Age: 83
End: 2018-10-23

## 2018-10-23 NOTE — PROGRESS NOTES
Placed discharge outreach phone call to pt's friend Carissa s/p pt's hospital discharge 10/21/18.  Left voicemail providing my contact information and instructions to call with any questions or concerns.

## 2018-12-18 LAB
EKG IMPRESSION: NORMAL
EKG IMPRESSION: NORMAL

## 2019-02-05 ENCOUNTER — HOSPITAL ENCOUNTER (EMERGENCY)
Facility: MEDICAL CENTER | Age: 84
End: 2019-02-05
Attending: EMERGENCY MEDICINE
Payer: MEDICARE

## 2019-02-05 ENCOUNTER — APPOINTMENT (OUTPATIENT)
Dept: RADIOLOGY | Facility: MEDICAL CENTER | Age: 84
End: 2019-02-05
Attending: EMERGENCY MEDICINE
Payer: MEDICARE

## 2019-02-05 VITALS
OXYGEN SATURATION: 98 % | HEART RATE: 91 BPM | RESPIRATION RATE: 17 BRPM | HEIGHT: 62 IN | BODY MASS INDEX: 18.03 KG/M2 | TEMPERATURE: 97.3 F | WEIGHT: 98 LBS

## 2019-02-05 DIAGNOSIS — S42.034A CLOSED NONDISPLACED FRACTURE OF ACROMIAL END OF RIGHT CLAVICLE, INITIAL ENCOUNTER: ICD-10-CM

## 2019-02-05 PROCEDURE — 99285 EMERGENCY DEPT VISIT HI MDM: CPT

## 2019-02-05 PROCEDURE — 36415 COLL VENOUS BLD VENIPUNCTURE: CPT

## 2019-02-05 PROCEDURE — 304561 HCHG STAT O2

## 2019-02-05 PROCEDURE — 73030 X-RAY EXAM OF SHOULDER: CPT | Mod: RT

## 2019-02-05 ASSESSMENT — LIFESTYLE VARIABLES: DO YOU DRINK ALCOHOL: NO

## 2019-02-05 NOTE — ED TRIAGE NOTES
"Chief Complaint   Patient presents with   • T-5000 FALL     fall down 5 stairs     Pulse (P) 91   Temp (P) 36.3 °C (97.3 °F) (Oral)   Resp (P) 18   Ht (P) 1.575 m (5' 2\")   Wt (P) 44.5 kg (98 lb)   SpO2 (P) 93%   BMI (P) 17.92 kg/m²     Pt BIB ems when trying to go down some stairs with her walker. Walker wheel was caught, causing the pt to fall down 5 stairs. Pt denies hitting head or losing consciousness. Pt complaining of right shoulder pain, hip pain, and leg pain.EMS gave pt 50 mcg fentanyl IN at 1400 for pain. Pt has small abrasion on right shin. No deformities noted. Pt is aaox4, ambulatory and on 2lpm via nasal cannula due to fentanyl.     Chart up for eval.    "

## 2019-02-06 NOTE — ED NOTES
Pt given discharge and follow up instructions, all questions answered, pt verbalized understanding. PIV removed, dressing applied.  Pt discharged with POA. Copy of discharge provided to pt. Signed copy in chart.  Pt states that all personal belongings are in possession. Pt taken off of unit via wheelchair with POA.

## 2019-02-06 NOTE — ED NOTES
Pt states that the sling is hurting her shoulder more, Dr Morel speaks with pt and informs pt she may take sling off and can use later if needed.

## 2019-03-25 ENCOUNTER — APPOINTMENT (OUTPATIENT)
Dept: RADIOLOGY | Facility: MEDICAL CENTER | Age: 84
DRG: 470 | End: 2019-03-25
Attending: EMERGENCY MEDICINE
Payer: MEDICARE

## 2019-03-25 ENCOUNTER — HOSPITAL ENCOUNTER (INPATIENT)
Facility: MEDICAL CENTER | Age: 84
LOS: 47 days | DRG: 470 | End: 2019-05-11
Attending: EMERGENCY MEDICINE | Admitting: HOSPITALIST
Payer: MEDICARE

## 2019-03-25 DIAGNOSIS — R41.82 ALTERED MENTAL STATUS, UNSPECIFIED ALTERED MENTAL STATUS TYPE: ICD-10-CM

## 2019-03-25 DIAGNOSIS — S72.002A CLOSED FRACTURE OF NECK OF LEFT FEMUR, INITIAL ENCOUNTER (HCC): ICD-10-CM

## 2019-03-25 DIAGNOSIS — I48.91 ATRIAL FIBRILLATION, UNSPECIFIED TYPE (HCC): ICD-10-CM

## 2019-03-25 DIAGNOSIS — G30.1 LATE ONSET ALZHEIMER'S DISEASE WITHOUT BEHAVIORAL DISTURBANCE (HCC): ICD-10-CM

## 2019-03-25 DIAGNOSIS — S72.002A CLOSED DISPLACED FRACTURE OF LEFT FEMORAL NECK (HCC): ICD-10-CM

## 2019-03-25 DIAGNOSIS — E87.20 LACTIC ACIDOSIS: ICD-10-CM

## 2019-03-25 DIAGNOSIS — F02.80 LATE ONSET ALZHEIMER'S DISEASE WITHOUT BEHAVIORAL DISTURBANCE (HCC): ICD-10-CM

## 2019-03-25 PROBLEM — D72.829 LEUKOCYTOSIS: Status: ACTIVE | Noted: 2019-03-25
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
## 2019-03-25 PROBLEM — R73.9 HYPERGLYCEMIA: Status: ACTIVE | Noted: 2019-03-25

## 2019-03-25 PROBLEM — E87.6 HYPOKALEMIA: Status: ACTIVE | Noted: 2019-03-25

## 2019-03-25 LAB
ABO GROUP BLD: NORMAL
ABO GROUP BLD: NORMAL
ALBUMIN SERPL BCP-MCNC: 4.2 G/DL (ref 3.2–4.9)
ALBUMIN/GLOB SERPL: 1.4 G/DL
ALP SERPL-CCNC: 112 U/L (ref 30–99)
ALT SERPL-CCNC: 15 U/L (ref 2–50)
ANION GAP SERPL CALC-SCNC: 17 MMOL/L (ref 0–11.9)
APPEARANCE UR: CLEAR
APTT PPP: 26.9 SEC (ref 24.7–36)
AST SERPL-CCNC: 35 U/L (ref 12–45)
BACTERIA #/AREA URNS HPF: NEGATIVE /HPF
BASOPHILS # BLD AUTO: 0.1 % (ref 0–1.8)
BASOPHILS # BLD: 0.02 K/UL (ref 0–0.12)
BILIRUB SERPL-MCNC: 0.9 MG/DL (ref 0.1–1.5)
BILIRUB UR QL STRIP.AUTO: NEGATIVE
BLD GP AB SCN SERPL QL: NORMAL
BNP SERPL-MCNC: 515 PG/ML (ref 0–100)
BUN SERPL-MCNC: 18 MG/DL (ref 8–22)
CALCIUM SERPL-MCNC: 9.9 MG/DL (ref 8.5–10.5)
CHLORIDE SERPL-SCNC: 98 MMOL/L (ref 96–112)
CK SERPL-CCNC: 593 U/L (ref 0–154)
CK SERPL-CCNC: 631 U/L (ref 0–154)
CO2 SERPL-SCNC: 24 MMOL/L (ref 20–33)
COLOR UR: YELLOW
CREAT SERPL-MCNC: 0.91 MG/DL (ref 0.5–1.4)
EKG IMPRESSION: NORMAL
EOSINOPHIL # BLD AUTO: 0 K/UL (ref 0–0.51)
EOSINOPHIL NFR BLD: 0 % (ref 0–6.9)
EPI CELLS #/AREA URNS HPF: NEGATIVE /HPF
ERYTHROCYTE [DISTWIDTH] IN BLOOD BY AUTOMATED COUNT: 44 FL (ref 35.9–50)
GLOBULIN SER CALC-MCNC: 3.1 G/DL (ref 1.9–3.5)
GLUCOSE SERPL-MCNC: 182 MG/DL (ref 65–99)
GLUCOSE UR STRIP.AUTO-MCNC: 250 MG/DL
HCT VFR BLD AUTO: 46.1 % (ref 37–47)
HGB BLD-MCNC: 15.3 G/DL (ref 12–16)
HYALINE CASTS #/AREA URNS LPF: ABNORMAL /LPF
IMM GRANULOCYTES # BLD AUTO: 0.09 K/UL (ref 0–0.11)
IMM GRANULOCYTES NFR BLD AUTO: 0.6 % (ref 0–0.9)
INR PPP: 1.09 (ref 0.87–1.13)
KETONES UR STRIP.AUTO-MCNC: NEGATIVE MG/DL
LACTATE BLD-SCNC: 2.7 MMOL/L (ref 0.5–2)
LACTATE BLD-SCNC: 8.7 MMOL/L (ref 0.5–2)
LEUKOCYTE ESTERASE UR QL STRIP.AUTO: NEGATIVE
LIPASE SERPL-CCNC: 3 U/L (ref 11–82)
LYMPHOCYTES # BLD AUTO: 0.59 K/UL (ref 1–4.8)
LYMPHOCYTES NFR BLD: 3.6 % (ref 22–41)
MAGNESIUM SERPL-MCNC: 1.9 MG/DL (ref 1.5–2.5)
MCH RBC QN AUTO: 30.4 PG (ref 27–33)
MCHC RBC AUTO-ENTMCNC: 33.2 G/DL (ref 33.6–35)
MCV RBC AUTO: 91.7 FL (ref 81.4–97.8)
MICRO URNS: ABNORMAL
MONOCYTES # BLD AUTO: 1.59 K/UL (ref 0–0.85)
MONOCYTES NFR BLD AUTO: 9.8 % (ref 0–13.4)
NEUTROPHILS # BLD AUTO: 13.96 K/UL (ref 2–7.15)
NEUTROPHILS NFR BLD: 85.9 % (ref 44–72)
NITRITE UR QL STRIP.AUTO: NEGATIVE
NRBC # BLD AUTO: 0 K/UL
NRBC BLD-RTO: 0 /100 WBC
PH UR STRIP.AUTO: 6.5 [PH]
PLATELET # BLD AUTO: 233 K/UL (ref 164–446)
PMV BLD AUTO: 10.2 FL (ref 9–12.9)
POTASSIUM SERPL-SCNC: 3.5 MMOL/L (ref 3.6–5.5)
PROT SERPL-MCNC: 7.3 G/DL (ref 6–8.2)
PROT UR QL STRIP: 100 MG/DL
PROTHROMBIN TIME: 14.2 SEC (ref 12–14.6)
RBC # BLD AUTO: 5.03 M/UL (ref 4.2–5.4)
RBC # URNS HPF: ABNORMAL /HPF
RBC UR QL AUTO: ABNORMAL
RH BLD: NORMAL
RH BLD: NORMAL
SODIUM SERPL-SCNC: 139 MMOL/L (ref 135–145)
SP GR UR STRIP.AUTO: 1.01
T4 FREE SERPL-MCNC: 1.05 NG/DL (ref 0.53–1.43)
TROPONIN I SERPL-MCNC: 0.03 NG/ML (ref 0–0.04)
TROPONIN I SERPL-MCNC: 0.04 NG/ML (ref 0–0.04)
TSH SERPL DL<=0.005 MIU/L-ACNC: 4.72 UIU/ML (ref 0.38–5.33)
UROBILINOGEN UR STRIP.AUTO-MCNC: 0.2 MG/DL
WBC # BLD AUTO: 16.3 K/UL (ref 4.8–10.8)
WBC #/AREA URNS HPF: ABNORMAL /HPF

## 2019-03-25 PROCEDURE — 73552 X-RAY EXAM OF FEMUR 2/>: CPT | Mod: LT

## 2019-03-25 PROCEDURE — 83690 ASSAY OF LIPASE: CPT

## 2019-03-25 PROCEDURE — 84443 ASSAY THYROID STIM HORMONE: CPT

## 2019-03-25 PROCEDURE — 96365 THER/PROPH/DIAG IV INF INIT: CPT

## 2019-03-25 PROCEDURE — 83735 ASSAY OF MAGNESIUM: CPT

## 2019-03-25 PROCEDURE — 94760 N-INVAS EAR/PLS OXIMETRY 1: CPT

## 2019-03-25 PROCEDURE — 85025 COMPLETE CBC W/AUTO DIFF WBC: CPT

## 2019-03-25 PROCEDURE — 304561 HCHG STAT O2

## 2019-03-25 PROCEDURE — 87040 BLOOD CULTURE FOR BACTERIA: CPT | Mod: 91

## 2019-03-25 PROCEDURE — 83880 ASSAY OF NATRIURETIC PEPTIDE: CPT

## 2019-03-25 PROCEDURE — 700111 HCHG RX REV CODE 636 W/ 250 OVERRIDE (IP): Performed by: EMERGENCY MEDICINE

## 2019-03-25 PROCEDURE — 82550 ASSAY OF CK (CPK): CPT | Mod: 91

## 2019-03-25 PROCEDURE — 86850 RBC ANTIBODY SCREEN: CPT

## 2019-03-25 PROCEDURE — 71260 CT THORAX DX C+: CPT

## 2019-03-25 PROCEDURE — 70450 CT HEAD/BRAIN W/O DYE: CPT

## 2019-03-25 PROCEDURE — 770020 HCHG ROOM/CARE - TELE (206)

## 2019-03-25 PROCEDURE — 86901 BLOOD TYPING SEROLOGIC RH(D): CPT

## 2019-03-25 PROCEDURE — 85610 PROTHROMBIN TIME: CPT

## 2019-03-25 PROCEDURE — 71045 X-RAY EXAM CHEST 1 VIEW: CPT

## 2019-03-25 PROCEDURE — 99223 1ST HOSP IP/OBS HIGH 75: CPT | Mod: AI | Performed by: HOSPITALIST

## 2019-03-25 PROCEDURE — 86900 BLOOD TYPING SEROLOGIC ABO: CPT

## 2019-03-25 PROCEDURE — 700105 HCHG RX REV CODE 258: Performed by: EMERGENCY MEDICINE

## 2019-03-25 PROCEDURE — 51702 INSERT TEMP BLADDER CATH: CPT

## 2019-03-25 PROCEDURE — 83605 ASSAY OF LACTIC ACID: CPT

## 2019-03-25 PROCEDURE — 700117 HCHG RX CONTRAST REV CODE 255: Performed by: EMERGENCY MEDICINE

## 2019-03-25 PROCEDURE — 85730 THROMBOPLASTIN TIME PARTIAL: CPT

## 2019-03-25 PROCEDURE — 80053 COMPREHEN METABOLIC PANEL: CPT

## 2019-03-25 PROCEDURE — 303105 HCHG CATHETER EXTRA

## 2019-03-25 PROCEDURE — 81001 URINALYSIS AUTO W/SCOPE: CPT

## 2019-03-25 PROCEDURE — 700105 HCHG RX REV CODE 258: Performed by: HOSPITALIST

## 2019-03-25 PROCEDURE — 51701 INSERT BLADDER CATHETER: CPT

## 2019-03-25 PROCEDURE — 99285 EMERGENCY DEPT VISIT HI MDM: CPT

## 2019-03-25 PROCEDURE — 72170 X-RAY EXAM OF PELVIS: CPT

## 2019-03-25 PROCEDURE — 84439 ASSAY OF FREE THYROXINE: CPT

## 2019-03-25 PROCEDURE — 36415 COLL VENOUS BLD VENIPUNCTURE: CPT

## 2019-03-25 PROCEDURE — 93005 ELECTROCARDIOGRAM TRACING: CPT

## 2019-03-25 PROCEDURE — 84484 ASSAY OF TROPONIN QUANT: CPT | Mod: 91

## 2019-03-25 PROCEDURE — 93005 ELECTROCARDIOGRAM TRACING: CPT | Performed by: EMERGENCY MEDICINE

## 2019-03-25 RX ORDER — AMOXICILLIN 250 MG
2 CAPSULE ORAL 2 TIMES DAILY
Status: DISCONTINUED | OUTPATIENT
Start: 2019-03-25 | End: 2019-03-26

## 2019-03-25 RX ORDER — ACETAMINOPHEN 500 MG
500-1000 TABLET ORAL EVERY 6 HOURS PRN
Status: ON HOLD | COMMUNITY
End: 2019-05-10

## 2019-03-25 RX ORDER — BISACODYL 10 MG
10 SUPPOSITORY, RECTAL RECTAL
Status: DISCONTINUED | OUTPATIENT
Start: 2019-03-25 | End: 2019-03-26

## 2019-03-25 RX ORDER — ONDANSETRON 4 MG/1
4 TABLET, ORALLY DISINTEGRATING ORAL EVERY 4 HOURS PRN
Status: DISCONTINUED | OUTPATIENT
Start: 2019-03-25 | End: 2019-05-01

## 2019-03-25 RX ORDER — OXYCODONE HYDROCHLORIDE 5 MG/1
2.5 TABLET ORAL
Status: DISCONTINUED | OUTPATIENT
Start: 2019-03-25 | End: 2019-05-01

## 2019-03-25 RX ORDER — ONDANSETRON 2 MG/ML
4 INJECTION INTRAMUSCULAR; INTRAVENOUS EVERY 4 HOURS PRN
Status: DISCONTINUED | OUTPATIENT
Start: 2019-03-25 | End: 2019-03-26

## 2019-03-25 RX ORDER — SODIUM CHLORIDE 9 MG/ML
1000 INJECTION, SOLUTION INTRAVENOUS ONCE
Status: COMPLETED | OUTPATIENT
Start: 2019-03-25 | End: 2019-03-25

## 2019-03-25 RX ORDER — OXYBUTYNIN CHLORIDE 5 MG/1
5 TABLET, EXTENDED RELEASE ORAL DAILY
Status: ON HOLD | COMMUNITY
End: 2019-04-01

## 2019-03-25 RX ORDER — POLYETHYLENE GLYCOL 3350 17 G/17G
1 POWDER, FOR SOLUTION ORAL
Status: DISCONTINUED | OUTPATIENT
Start: 2019-03-25 | End: 2019-03-26

## 2019-03-25 RX ORDER — POTASSIUM CHLORIDE 20 MEQ/1
20 TABLET, EXTENDED RELEASE ORAL ONCE
Status: ACTIVE | OUTPATIENT
Start: 2019-03-25 | End: 2019-03-26

## 2019-03-25 RX ORDER — FLUDROCORTISONE ACETATE 0.1 MG/1
0.1 TABLET ORAL DAILY
Status: ON HOLD | COMMUNITY
End: 2019-05-10

## 2019-03-25 RX ORDER — SODIUM CHLORIDE 9 MG/ML
INJECTION, SOLUTION INTRAVENOUS CONTINUOUS
Status: DISCONTINUED | OUTPATIENT
Start: 2019-03-25 | End: 2019-03-26

## 2019-03-25 RX ORDER — MORPHINE SULFATE 4 MG/ML
2 INJECTION, SOLUTION INTRAMUSCULAR; INTRAVENOUS
Status: DISCONTINUED | OUTPATIENT
Start: 2019-03-25 | End: 2019-04-12

## 2019-03-25 RX ORDER — OXYBUTYNIN CHLORIDE 5 MG/1
5 TABLET, EXTENDED RELEASE ORAL DAILY
Status: DISCONTINUED | OUTPATIENT
Start: 2019-03-26 | End: 2019-03-27

## 2019-03-25 RX ORDER — OXYCODONE HYDROCHLORIDE 5 MG/1
5 TABLET ORAL
Status: DISCONTINUED | OUTPATIENT
Start: 2019-03-25 | End: 2019-04-12

## 2019-03-25 RX ORDER — ACETAMINOPHEN 325 MG/1
650 TABLET ORAL EVERY 6 HOURS PRN
Status: DISCONTINUED | OUTPATIENT
Start: 2019-03-25 | End: 2019-03-26

## 2019-03-25 RX ADMIN — IOHEXOL 90 ML: 350 INJECTION, SOLUTION INTRAVENOUS at 15:39

## 2019-03-25 RX ADMIN — SODIUM CHLORIDE: 9 INJECTION, SOLUTION INTRAVENOUS at 18:21

## 2019-03-25 RX ADMIN — SODIUM CHLORIDE 1000 ML: 9 INJECTION, SOLUTION INTRAVENOUS at 13:23

## 2019-03-25 RX ADMIN — PIPERACILLIN AND TAZOBACTAM 3.38 G: 3; .375 INJECTION, POWDER, LYOPHILIZED, FOR SOLUTION INTRAVENOUS; PARENTERAL at 14:44

## 2019-03-25 ASSESSMENT — ENCOUNTER SYMPTOMS
BLURRED VISION: 0
DEPRESSION: 0
PALPITATIONS: 0
COUGH: 0
FEVER: 0
SINUS PAIN: 0
DIZZINESS: 0
HEARTBURN: 0
HEADACHES: 0
BRUISES/BLEEDS EASILY: 0
HEMOPTYSIS: 0
CHILLS: 0
ORTHOPNEA: 0
MYALGIAS: 0
NAUSEA: 0
DOUBLE VISION: 0

## 2019-03-25 ASSESSMENT — COPD QUESTIONNAIRES
HAVE YOU SMOKED AT LEAST 100 CIGARETTES IN YOUR ENTIRE LIFE: NO/DON'T KNOW
COPD SCREENING SCORE: 2
DO YOU EVER COUGH UP ANY MUCUS OR PHLEGM?: NO/ONLY WITH OCCASIONAL COLDS OR INFECTIONS
DURING THE PAST 4 WEEKS HOW MUCH DID YOU FEEL SHORT OF BREATH: NONE/LITTLE OF THE TIME

## 2019-03-25 ASSESSMENT — LIFESTYLE VARIABLES: EVER_SMOKED: NEVER

## 2019-03-25 NOTE — ED TRIAGE NOTES
BIB [facility] to rm 16, pt is coming from home, was on the phone with her care giver when she fell.  Upon arrival, pt had dried coffee ground emesis in her hair and jacket, and is in a-fib, which is new for her.  Pt was hypotensive for REMSA SBP 89, was given 200cc NS and is now 134-73.  Pt also has L hip pain, her L leg is shortened and rotated.  Pt has an abrasion her her R knee and L shin.  Pt is AOx2.

## 2019-03-25 NOTE — ED NOTES
Elizabeth from Lab called with critical result of lactic at 8.7. Critical lab result read back to Elizabeth.   Dr. Martin notified of critical lab result at 1314.  Critical lab result read back by Dr. Martin.

## 2019-03-25 NOTE — ED NOTES
Temp aguayo catheter inserted using aseptic technique. Pt tolerated procedure well. 350ml urine returned upon insertion. Urine appears light yellow and clear.

## 2019-03-25 NOTE — ED NOTES
Med rec completed per pt's family and home pharmacy ([facility])  Allergies reviewed   No PO antibiotics in the last 30 days

## 2019-03-25 NOTE — ED PROVIDER NOTES
"ED Provider Note    Scribed for Tomer Martin M.D. by Polo Chacon. 3/25/2019  12:52 PM    Means of arrival: Ambulance  History obtained from: Patient's caretaker  History limited by: none    CHIEF COMPLAINT  Chief Complaint   Patient presents with   • Syncope   • Blood in Vomit   • Atrial Fibrillation       HPI    Rosalba Wagner is a 89 y.o. female with a history of a renal aneurysm presenting with altered mental status. Per patient's caretaker, earlier today she received a call from her neighbor regarding the patient's status. She was reportedly found laying on the ground and was very unresponsive. She also had dried coffee ground emesis in her hair and jacket and was in a-fib en route to the ED which is new for her. Her caretaker admits that she is usually \"very sassy\" and responds to questions quickly. She does not taking any blood thinning medications on a regular basis and is otherwise healthy for her age. She is not currently experiencing any abdominal pain.     REVIEW OF SYSTEMS  See HPI for further details.   Pertinent positives include: syncope, unresponsive, dried coffee ground emesis  Pertinent negative include: abdominal pain  10 + review of systems otherwise negative     PAST MEDICAL HISTORY   has a past medical history of Chest pain, atypical (8/21/2012); Dementia; Left arm numbness (8/21/2012); OSTEOPOROSIS (8/21/2012); and Syncope (8/21/2012). Renal aneurysm     SOCIAL HISTORY  Social History     Social History Main Topics   • Smoking status: Former Smoker     Quit date: 9/11/1984   • Smokeless tobacco: Never Used   • Alcohol use No   • Drug use: No   • Sexual activity: Not on file       SURGICAL HISTORY  patient denies any surgical history    CURRENT MEDICATIONS  No current facility-administered medications for this encounter.     Current Outpatient Prescriptions:   •  therapeutic multivitamin-minerals (THERAGRAN-M) Tab, Take 1 Tab by mouth every day., Disp: , Rfl:   •  NON " SPECIFIED, Place 1 Drop in left eye every day. RX Eye drop Unknown Name and Strength, Disp: , Rfl:     ALLERGIES  No Known Allergies    PHYSICAL EXAM  VITAL SIGNS: /73   Pulse 81   Temp 35.9 °C (96.6 °F) (Temporal)   Resp 15   Wt 43.5 kg (96 lb)   SpO2 90%   BMI 17.56 kg/m²    SpO2: I interpret this pulse oximetry as abnormal  Constitutional: Well developed, Well nourished, no distress, Non-toxic appearance.   HENT: Normocephalic, Atraumatic, Bilateral external ears normal, Dry mucous membranes. No oral exudates.   Eyes: PERRLA, EOMI, Conjunctiva normal, No discharge.   Neck: No tenderness, Supple, No stridor.   Lymphatic: No lymphadenopathy noted.   Cardiovascular: Normal heart rate, Normal rhythm.   Thorax & Lungs: Clear to auscultation bilaterally, No respiratory distress, No wheezing, No crackles.   Abdomen: Soft, No tenderness, No masses, No pulsatile masses.   Skin: Warm, Dry, No rash. Old abrasions to right and left lower extremity  Extremities:, No edema No cyanosis.   Musculoskeletal: Weak DP pulse but present bilaterally. Normal cap refill. Left leg is short and internally rotated.  Distal neurovascular intact.  Neurologic: Awake. Moves all extremities spontaneously. Cranial nerves II-XII intact. Good toe movement   Psychiatric: Affect normal, Judgment normal, Mood normal.     DIAGNOSTIC STUDIES / PROCEDURES    EKG  As below    LABORATORY  Results for orders placed or performed during the hospital encounter of 03/25/19   CBC WITH DIFFERENTIAL   Result Value Ref Range    WBC 16.3 (H) 4.8 - 10.8 K/uL    RBC 5.03 4.20 - 5.40 M/uL    Hemoglobin 15.3 12.0 - 16.0 g/dL    Hematocrit 46.1 37.0 - 47.0 %    MCV 91.7 81.4 - 97.8 fL    MCH 30.4 27.0 - 33.0 pg    MCHC 33.2 (L) 33.6 - 35.0 g/dL    RDW 44.0 35.9 - 50.0 fL    Platelet Count 233 164 - 446 K/uL    MPV 10.2 9.0 - 12.9 fL    Neutrophils-Polys 85.90 (H) 44.00 - 72.00 %    Lymphocytes 3.60 (L) 22.00 - 41.00 %    Monocytes 9.80 0.00 - 13.40 %     Eosinophils 0.00 0.00 - 6.90 %    Basophils 0.10 0.00 - 1.80 %    Immature Granulocytes 0.60 0.00 - 0.90 %    Nucleated RBC 0.00 /100 WBC    Neutrophils (Absolute) 13.96 (H) 2.00 - 7.15 K/uL    Lymphs (Absolute) 0.59 (L) 1.00 - 4.80 K/uL    Monos (Absolute) 1.59 (H) 0.00 - 0.85 K/uL    Eos (Absolute) 0.00 0.00 - 0.51 K/uL    Baso (Absolute) 0.02 0.00 - 0.12 K/uL    Immature Granulocytes (abs) 0.09 0.00 - 0.11 K/uL    NRBC (Absolute) 0.00 K/uL   COMP METABOLIC PANEL   Result Value Ref Range    Sodium 139 135 - 145 mmol/L    Potassium 3.5 (L) 3.6 - 5.5 mmol/L    Chloride 98 96 - 112 mmol/L    Co2 24 20 - 33 mmol/L    Anion Gap 17.0 (H) 0.0 - 11.9    Glucose 182 (H) 65 - 99 mg/dL    Bun 18 8 - 22 mg/dL    Creatinine 0.91 0.50 - 1.40 mg/dL    Calcium 9.9 8.5 - 10.5 mg/dL    AST(SGOT) 35 12 - 45 U/L    ALT(SGPT) 15 2 - 50 U/L    Alkaline Phosphatase 112 (H) 30 - 99 U/L    Total Bilirubin 0.9 0.1 - 1.5 mg/dL    Albumin 4.2 3.2 - 4.9 g/dL    Total Protein 7.3 6.0 - 8.2 g/dL    Globulin 3.1 1.9 - 3.5 g/dL    A-G Ratio 1.4 g/dL   LIPASE   Result Value Ref Range    Lipase 3 (L) 11 - 82 U/L   TROPONIN   Result Value Ref Range    Troponin I 0.03 0.00 - 0.04 ng/mL   BTYPE NATRIURETIC PEPTIDE   Result Value Ref Range    B Natriuretic Peptide 515 (H) 0 - 100 pg/mL   LACTIC ACID   Result Value Ref Range    Lactic Acid 8.7 (HH) 0.5 - 2.0 mmol/L   URINALYSIS CULTURE, IF INDICATED   Result Value Ref Range    Color Yellow     Character Clear     Specific Gravity 1.014 <1.035    Ph 6.5 5.0 - 8.0    Glucose 250 (A) Negative mg/dL    Ketones Negative Negative mg/dL    Protein 100 (A) Negative mg/dL    Bilirubin Negative Negative    Urobilinogen, Urine 0.2 Negative    Nitrite Negative Negative    Leukocyte Esterase Negative Negative    Occult Blood Trace (A) Negative    Micro Urine Req Microscopic    PROTHROMBIN TIME (INR)   Result Value Ref Range    PT 14.2 12.0 - 14.6 sec    INR 1.09 0.87 - 1.13   APTT   Result Value Ref Range    APTT  26.9 24.7 - 36.0 sec   COD (ADULT)   Result Value Ref Range    ABO Grouping Only O     Rh Grouping Only POS     Antibody Screen-Cod NEG    ABO AND RH CONFIRMATION   Result Value Ref Range    ABO Confirm O     Second Rh Group POS    CREATINE KINASE   Result Value Ref Range    CPK Total 593 (H) 0 - 154 U/L   ESTIMATED GFR   Result Value Ref Range    GFR If African American >60 >60 mL/min/1.73 m 2    GFR If Non  57 (A) >60 mL/min/1.73 m 2   URINE MICROSCOPIC (W/UA)   Result Value Ref Range    WBC 0-2 /hpf    RBC 2-5 (A) /hpf    Bacteria Negative None /hpf    Epithelial Cells Negative /hpf    Hyaline Cast 6-10 (A) /lpf   EKG   Result Value Ref Range    Report       [facility] Emergency Dept.    Test Date:  2019  Pt Name:    MOISES ARMENTA                Department: ER  MRN:        [id number]                      Room:       [facility]  Gender:     Female                       Technician: [id number]  :        [id number]                   Requested By:ER TRIAGE PROTOCOL  Order #:    [id number]                    Reading MD: Tomer Martin MD    Measurements  Intervals                                Axis  Rate:       98                           P:  NM:                                      QRS:        44  QRSD:       74                           T:          250  QT:         356  QTc:        455    Interpretive Statements  EKG (my interpretation): Irregular rhythm, rate 98, axis normal, no ST or  T-wave  abnormalities, PVCs noted, atrial fibrillation, rate controlled, A. fib not  noted  on prior EKGs.  Relevant clinical issues: Syncope  Electronically Signed On 3- 13:54:26 PDT by Tomer Martin MD           RADIOLOGY    CT-CHEST,ABDOMEN,PELVIS WITH   Final Result      1.  Acute left femoral neck fracture is identified.      2.  Fluid-containing structures in the pelvis bilaterally are identified. These could represent urinary bladder diverticula versus ovarian cysts or adnexal cysts.       3.  No solid organ injury identified.      4.  Small renal cysts are noted.      5.  Gallbladder appears distended and may contain a gallstone.      6.  Emphysema and areas of fibrosis are noted in the chest.      7.  Extensive atherosclerosis is noted throughout the aorta and its branch vessels.         CT-HEAD W/O   Final Result         NO ACUTE ABNORMALITIES ARE NOTED ON CT SCAN OF THE HEAD.      Findings are consistent with atrophy.  Decreased attenuation in the periventricular white matter likely indicates microvascular ischemic disease.      DX-FEMUR-2+ LEFT   Final Result      Left femoral neck fracture with displacement      DX-PELVIS-1 OR 2 VIEWS   Final Result      1.  Acute left femoral neck fracture with displacement      2.  Right superior and inferior pubic rami fracture, acuity indeterminate      DX-CHEST-PORTABLE (1 VIEW)   Final Result      1.  Interstitial densities which could be fibrosis or edema      2.  Air lucency projecting over the left lung base which could be a contusion or much less likely etiology such as diaphragmatic injury. CT recommended for further assessment          Left hip x-ray, 3 views: Left femoral neck fracture with displacement.  No prior films were immediately available for comparison.  Impression: Left femoral neck fracture      CHART REVIEW  Pertinent medical chart information was reviewed and reveals: no recent pertinent encounters    COURSE & MEDICAL DECISION MAKING  Pertinent Labs & Imaging studies reviewed. (See chart for details)    Differential diagnoses include but not limited to: Arrhythmia, ACS, head trauma, ICH, GI bleed, sepsis, pelvic fracture, femur fracture, hip fracture    12:52 PM - Patient seen and examined at bedside. Discussed plan of care to patient's caretaker, including imaging and labs. Patient's caretaker agrees to the plan of care. Ordered for CT-abdomen pelvis with contrast, CT-head without, DX-hip unilateral with pelvis 1 view left,  DX-chest 1 view, lactic acid, urinalysis blood culture, PTT, APTT, COD, CBC with differential, CMP, lipase, troponin, BNP, EKG to evaluate her symptoms.  Patient will be treated with 3.375 g Zosyn in  mL IVPB. She will also be treated with 1000 mL NS infusion secondary to clinical signs of dehydration.    2:12 PM Patient is feeling improved following resuscitation with IV fluids. DX-femur 2+ left ordered at this time.      2:53 PM Paged ortho.    3:13 PM I discussed the patient's case and the above findings with Dr. Lewis (ortho) who agrees to evaluate the patient.     4:24 PM I discussed the patient's case and the above findings with Dr. Carcamo (hospitalist) who agrees to admit the patient.       Vitals:    03/25/19 1137 03/25/19 1200 03/25/19 1230 03/25/19 1300   BP: 134/73      Pulse: 85 81 78 81   Resp: 16 15 15 15   Temp: 35.9 °C (96.6 °F)      TempSrc: Temporal      SpO2: 94% 90% 94% 95%   Weight:           Medications   NS infusion 1,000 mL (0 mL Intravenous Stopped 3/25/19 1448)   piperacillin-tazobactam (ZOSYN) 3.375 g in  mL IVPB (0 g Intravenous Stopped 3/25/19 1514)   iohexol (OMNIPAQUE) 350 mg/mL (90 mL Intravenous Given 3/25/19 1539)       HYDRATION: Based on the patient's presentation of Dehydration the patient was given IV fluids. IV Hydration was used because oral hydration was not adequate alone. Upon recheck following hydration, the patient was improved.    89-year-old female presenting for altered mental status, found down.  Per EMS, hypotensive  in the field was responded to small fluid bolus. Reportedly found down with coffee-ground emesis in her hair, confused.  Also noted to have atrial fibrillation, does not have history of atrial fibrillation.  Arrival here, patient is hemodynamically stable, no current hypotension or significant hypoxia, is easily arousable and alert, no focal neurologic deficits.  Exam concerning for hip fracture.  Distally neurovascular intact.  Initiated  syncope, sepsis, cardiac, traumatic workup.  Currently without any vomiting and hemoglobin is normal, no abdominal pain, no history concerning for GI bleed, low suspicion for serious GI bleed.  Imaging results show left femoral neck fracture with possible old pubic rami fractures, imaging otherwise is unremarkable.  With initial hypotension and then later recurrent hypotension, given empiric fluids and anabiotic's, lactate was also elevated, however no acute source of infection identified.  Blood pressure responded very well to normal saline bolus and patient requiring further intervention.  Orthopedics consulted for hip fracture, will see the patient, patient not wanting pain medications at this time and appears very comfortable.  Regarding A. fib, no acute ischemia identified, rate controlled A. fib, will need further workup as an inpatient.  Consulted medicine for admission.  Confusion has gradually improved and patient without any decline in mental status.  Patient hemodynamically stable and comfortable at time of admission.    DISPOSITION  DISPOSITION:  Patient will be admitted to Dr. Carcamo in guarded condition.      FINAL IMPRESSION  Visit Diagnoses     ICD-10-CM   1. Closed displaced fracture of left femoral neck (HCC) S72.002A   2. Altered mental status, unspecified altered mental status type R41.82   3. Lactic acidosis E87.2   4. Atrial fibrillation, unspecified type ([facility]) I48.91        Polo NAVARRETE (Scribe), am scribing for, and in the presence of, Tomer Martin M.D..    Electronically signed by: Polo Chacon (Scribe), 3/25/2019    Tomer NAVARRETE M.D. personally performed the services described in this documentation, as scribed by Polo Chacon in my presence, and it is both accurate and complete. C.     The note accurately reflects work and decisions made by me.  Tomer Martin  3/25/2019  5:54 PM

## 2019-03-26 ENCOUNTER — ANESTHESIA (OUTPATIENT)
Dept: SURGERY | Facility: MEDICAL CENTER | Age: 84
DRG: 470 | End: 2019-03-26
Payer: MEDICARE

## 2019-03-26 ENCOUNTER — ANESTHESIA EVENT (OUTPATIENT)
Dept: SURGERY | Facility: MEDICAL CENTER | Age: 84
DRG: 470 | End: 2019-03-26
Payer: MEDICARE

## 2019-03-26 ENCOUNTER — APPOINTMENT (OUTPATIENT)
Dept: CARDIOLOGY | Facility: MEDICAL CENTER | Age: 84
DRG: 470 | End: 2019-03-26
Attending: HOSPITALIST
Payer: MEDICARE

## 2019-03-26 LAB
ALBUMIN SERPL BCP-MCNC: 3.7 G/DL (ref 3.2–4.9)
ALBUMIN/GLOB SERPL: 1.3 G/DL
ALP SERPL-CCNC: 92 U/L (ref 30–99)
ALT SERPL-CCNC: 13 U/L (ref 2–50)
ANION GAP SERPL CALC-SCNC: 7 MMOL/L (ref 0–11.9)
AST SERPL-CCNC: 30 U/L (ref 12–45)
BILIRUB SERPL-MCNC: 0.7 MG/DL (ref 0.1–1.5)
BUN SERPL-MCNC: 19 MG/DL (ref 8–22)
CALCIUM SERPL-MCNC: 9.6 MG/DL (ref 8.5–10.5)
CHLORIDE SERPL-SCNC: 104 MMOL/L (ref 96–112)
CHOLEST SERPL-MCNC: 150 MG/DL (ref 100–199)
CK SERPL-CCNC: 847 U/L (ref 0–154)
CO2 SERPL-SCNC: 30 MMOL/L (ref 20–33)
CREAT SERPL-MCNC: 0.76 MG/DL (ref 0.5–1.4)
EKG IMPRESSION: NORMAL
ERYTHROCYTE [DISTWIDTH] IN BLOOD BY AUTOMATED COUNT: 43.3 FL (ref 35.9–50)
GLOBULIN SER CALC-MCNC: 2.8 G/DL (ref 1.9–3.5)
GLUCOSE SERPL-MCNC: 120 MG/DL (ref 65–99)
HCT VFR BLD AUTO: 41.5 % (ref 37–47)
HDLC SERPL-MCNC: 64 MG/DL
HGB BLD-MCNC: 13.8 G/DL (ref 12–16)
LACTATE BLD-SCNC: 1.5 MMOL/L (ref 0.5–2)
LDLC SERPL CALC-MCNC: 74 MG/DL
MCH RBC QN AUTO: 30 PG (ref 27–33)
MCHC RBC AUTO-ENTMCNC: 33.3 G/DL (ref 33.6–35)
MCV RBC AUTO: 90.2 FL (ref 81.4–97.8)
PLATELET # BLD AUTO: 205 K/UL (ref 164–446)
PMV BLD AUTO: 10.4 FL (ref 9–12.9)
POTASSIUM SERPL-SCNC: 4.1 MMOL/L (ref 3.6–5.5)
PROCALCITONIN SERPL-MCNC: 0.21 NG/ML
PROT SERPL-MCNC: 6.5 G/DL (ref 6–8.2)
RBC # BLD AUTO: 4.6 M/UL (ref 4.2–5.4)
SODIUM SERPL-SCNC: 141 MMOL/L (ref 135–145)
TRIGL SERPL-MCNC: 61 MG/DL (ref 0–149)
TROPONIN I SERPL-MCNC: 0.05 NG/ML (ref 0–0.04)
WBC # BLD AUTO: 14.8 K/UL (ref 4.8–10.8)

## 2019-03-26 PROCEDURE — 82550 ASSAY OF CK (CPK): CPT

## 2019-03-26 PROCEDURE — 502578 HCHG PACK, TOTAL HIP: Performed by: ORTHOPAEDIC SURGERY

## 2019-03-26 PROCEDURE — 83605 ASSAY OF LACTIC ACID: CPT

## 2019-03-26 PROCEDURE — 700111 HCHG RX REV CODE 636 W/ 250 OVERRIDE (IP): Performed by: ORTHOPAEDIC SURGERY

## 2019-03-26 PROCEDURE — 36415 COLL VENOUS BLD VENIPUNCTURE: CPT

## 2019-03-26 PROCEDURE — 160048 HCHG OR STATISTICAL LEVEL 1-5: Performed by: ORTHOPAEDIC SURGERY

## 2019-03-26 PROCEDURE — 700102 HCHG RX REV CODE 250 W/ 637 OVERRIDE(OP): Performed by: ORTHOPAEDIC SURGERY

## 2019-03-26 PROCEDURE — 99233 SBSQ HOSP IP/OBS HIGH 50: CPT | Performed by: HOSPITALIST

## 2019-03-26 PROCEDURE — A9270 NON-COVERED ITEM OR SERVICE: HCPCS | Performed by: ORTHOPAEDIC SURGERY

## 2019-03-26 PROCEDURE — 160031 HCHG SURGERY MINUTES - 1ST 30 MINS LEVEL 5: Performed by: ORTHOPAEDIC SURGERY

## 2019-03-26 PROCEDURE — 700111 HCHG RX REV CODE 636 W/ 250 OVERRIDE (IP)

## 2019-03-26 PROCEDURE — A6454 SELF-ADHER BAND W>=3" <5"/YD: HCPCS | Performed by: ORTHOPAEDIC SURGERY

## 2019-03-26 PROCEDURE — 85027 COMPLETE CBC AUTOMATED: CPT

## 2019-03-26 PROCEDURE — 160009 HCHG ANES TIME/MIN: Performed by: ORTHOPAEDIC SURGERY

## 2019-03-26 PROCEDURE — 84484 ASSAY OF TROPONIN QUANT: CPT

## 2019-03-26 PROCEDURE — 770001 HCHG ROOM/CARE - MED/SURG/GYN PRIV*

## 2019-03-26 PROCEDURE — 160036 HCHG PACU - EA ADDL 30 MINS PHASE I: Performed by: ORTHOPAEDIC SURGERY

## 2019-03-26 PROCEDURE — A9270 NON-COVERED ITEM OR SERVICE: HCPCS | Performed by: HOSPITALIST

## 2019-03-26 PROCEDURE — 80061 LIPID PANEL: CPT

## 2019-03-26 PROCEDURE — 700105 HCHG RX REV CODE 258: Performed by: ANESTHESIOLOGY

## 2019-03-26 PROCEDURE — 700111 HCHG RX REV CODE 636 W/ 250 OVERRIDE (IP): Performed by: HOSPITALIST

## 2019-03-26 PROCEDURE — 93010 ELECTROCARDIOGRAM REPORT: CPT | Performed by: INTERNAL MEDICINE

## 2019-03-26 PROCEDURE — 700102 HCHG RX REV CODE 250 W/ 637 OVERRIDE(OP): Performed by: HOSPITALIST

## 2019-03-26 PROCEDURE — 160035 HCHG PACU - 1ST 60 MINS PHASE I: Performed by: ORTHOPAEDIC SURGERY

## 2019-03-26 PROCEDURE — 700102 HCHG RX REV CODE 250 W/ 637 OVERRIDE(OP)

## 2019-03-26 PROCEDURE — 700105 HCHG RX REV CODE 258

## 2019-03-26 PROCEDURE — A9270 NON-COVERED ITEM OR SERVICE: HCPCS

## 2019-03-26 PROCEDURE — 700112 HCHG RX REV CODE 229: Performed by: ORTHOPAEDIC SURGERY

## 2019-03-26 PROCEDURE — 502000 HCHG MISC OR IMPLANTS RC 0278: Performed by: ORTHOPAEDIC SURGERY

## 2019-03-26 PROCEDURE — 84145 PROCALCITONIN (PCT): CPT

## 2019-03-26 PROCEDURE — 501838 HCHG SUTURE GENERAL: Performed by: ORTHOPAEDIC SURGERY

## 2019-03-26 PROCEDURE — 700101 HCHG RX REV CODE 250: Performed by: ANESTHESIOLOGY

## 2019-03-26 PROCEDURE — 92610 EVALUATE SWALLOWING FUNCTION: CPT

## 2019-03-26 PROCEDURE — 0SRS019 REPLACEMENT OF LEFT HIP JOINT, FEMORAL SURFACE WITH METAL SYNTHETIC SUBSTITUTE, CEMENTED, OPEN APPROACH: ICD-10-PCS | Performed by: ORTHOPAEDIC SURGERY

## 2019-03-26 PROCEDURE — 80053 COMPREHEN METABOLIC PANEL: CPT

## 2019-03-26 PROCEDURE — 93005 ELECTROCARDIOGRAM TRACING: CPT | Performed by: HOSPITALIST

## 2019-03-26 PROCEDURE — 700111 HCHG RX REV CODE 636 W/ 250 OVERRIDE (IP): Performed by: ANESTHESIOLOGY

## 2019-03-26 PROCEDURE — L8699 PROSTHETIC IMPLANT NOS: HCPCS | Performed by: ORTHOPAEDIC SURGERY

## 2019-03-26 PROCEDURE — 160042 HCHG SURGERY MINUTES - EA ADDL 1 MIN LEVEL 5: Performed by: ORTHOPAEDIC SURGERY

## 2019-03-26 PROCEDURE — 83036 HEMOGLOBIN GLYCOSYLATED A1C: CPT

## 2019-03-26 PROCEDURE — 160002 HCHG RECOVERY MINUTES (STAT): Performed by: ORTHOPAEDIC SURGERY

## 2019-03-26 DEVICE — IMPLANTABLE DEVICE: Type: IMPLANTABLE DEVICE | Site: HIP | Status: FUNCTIONAL

## 2019-03-26 DEVICE — IMPLANT TANDEM BIPOLAR COCR 48OD 28ID: Type: IMPLANTABLE DEVICE | Site: HIP | Status: FUNCTIONAL

## 2019-03-26 DEVICE — BONE CEMENT SIMPLEX ANTIBIO - (10/PK): Type: IMPLANTABLE DEVICE | Site: HIP | Status: FUNCTIONAL

## 2019-03-26 DEVICE — IMPLANT CONQ FX FEM COMP SZ 9: Type: IMPLANTABLE DEVICE | Site: HIP | Status: FUNCTIONAL

## 2019-03-26 DEVICE — IMPLANT COCR 12/14 FEM HEAD 28 +0: Type: IMPLANTABLE DEVICE | Site: HIP | Status: FUNCTIONAL

## 2019-03-26 DEVICE — IMPLANT INVIS DIST CENT SZ 9MM (1EA): Type: IMPLANTABLE DEVICE | Site: HIP | Status: FUNCTIONAL

## 2019-03-26 RX ORDER — SCOLOPAMINE TRANSDERMAL SYSTEM 1 MG/1
1 PATCH, EXTENDED RELEASE TRANSDERMAL
Status: DISCONTINUED | OUTPATIENT
Start: 2019-03-26 | End: 2019-05-01

## 2019-03-26 RX ORDER — ACETAMINOPHEN 500 MG
1000 TABLET ORAL EVERY 8 HOURS
Status: DISPENSED | OUTPATIENT
Start: 2019-03-27 | End: 2019-03-31

## 2019-03-26 RX ORDER — MORPHINE SULFATE 0.5 MG/ML
INJECTION, SOLUTION EPIDURAL; INTRATHECAL; INTRAVENOUS
Status: DISCONTINUED | OUTPATIENT
Start: 2019-03-26 | End: 2019-03-26 | Stop reason: HOSPADM

## 2019-03-26 RX ORDER — BUPIVACAINE HYDROCHLORIDE 2.5 MG/ML
INJECTION, SOLUTION EPIDURAL; INFILTRATION; INTRACAUDAL
Status: DISCONTINUED | OUTPATIENT
Start: 2019-03-26 | End: 2019-03-26 | Stop reason: HOSPADM

## 2019-03-26 RX ORDER — HALOPERIDOL 5 MG/ML
1 INJECTION INTRAMUSCULAR
Status: DISCONTINUED | OUTPATIENT
Start: 2019-03-26 | End: 2019-03-26 | Stop reason: HOSPADM

## 2019-03-26 RX ORDER — HALOPERIDOL 5 MG/ML
1 INJECTION INTRAMUSCULAR EVERY 6 HOURS PRN
Status: DISCONTINUED | OUTPATIENT
Start: 2019-03-26 | End: 2019-04-19

## 2019-03-26 RX ORDER — ONDANSETRON 2 MG/ML
4 INJECTION INTRAMUSCULAR; INTRAVENOUS
Status: DISCONTINUED | OUTPATIENT
Start: 2019-03-26 | End: 2019-03-26 | Stop reason: HOSPADM

## 2019-03-26 RX ORDER — BISACODYL 10 MG
10 SUPPOSITORY, RECTAL RECTAL
Status: DISCONTINUED | OUTPATIENT
Start: 2019-03-26 | End: 2019-05-01

## 2019-03-26 RX ORDER — OXYCODONE HYDROCHLORIDE AND ACETAMINOPHEN 5; 325 MG/1; MG/1
1 TABLET ORAL
Status: DISCONTINUED | OUTPATIENT
Start: 2019-03-26 | End: 2019-03-26 | Stop reason: HOSPADM

## 2019-03-26 RX ORDER — EPINEPHRINE 1 MG/ML(1)
AMPUL (ML) INJECTION PRN
Status: DISCONTINUED | OUTPATIENT
Start: 2019-03-26 | End: 2019-03-26 | Stop reason: SURG

## 2019-03-26 RX ORDER — DOCUSATE SODIUM 100 MG/1
100 CAPSULE, LIQUID FILLED ORAL 2 TIMES DAILY
Status: DISCONTINUED | OUTPATIENT
Start: 2019-03-26 | End: 2019-05-01

## 2019-03-26 RX ORDER — SODIUM CHLORIDE 9 MG/ML
INJECTION, SOLUTION INTRAVENOUS
Status: COMPLETED
Start: 2019-03-26 | End: 2019-03-26

## 2019-03-26 RX ORDER — ACETAMINOPHEN 500 MG
TABLET ORAL
Status: DISCONTINUED
Start: 2019-03-26 | End: 2019-03-26

## 2019-03-26 RX ORDER — DIPHENHYDRAMINE HYDROCHLORIDE 50 MG/ML
25 INJECTION INTRAMUSCULAR; INTRAVENOUS EVERY 6 HOURS PRN
Status: DISCONTINUED | OUTPATIENT
Start: 2019-03-26 | End: 2019-05-01

## 2019-03-26 RX ORDER — AMOXICILLIN 250 MG
1 CAPSULE ORAL
Status: DISCONTINUED | OUTPATIENT
Start: 2019-03-26 | End: 2019-05-01

## 2019-03-26 RX ORDER — OXYCODONE HYDROCHLORIDE AND ACETAMINOPHEN 5; 325 MG/1; MG/1
2 TABLET ORAL
Status: DISCONTINUED | OUTPATIENT
Start: 2019-03-26 | End: 2019-03-26 | Stop reason: HOSPADM

## 2019-03-26 RX ORDER — MAGNESIUM SULFATE HEPTAHYDRATE 40 MG/ML
INJECTION, SOLUTION INTRAVENOUS PRN
Status: DISCONTINUED | OUTPATIENT
Start: 2019-03-26 | End: 2019-03-26 | Stop reason: SURG

## 2019-03-26 RX ORDER — ACETAMINOPHEN 500 MG
1000 TABLET ORAL ONCE
Status: COMPLETED | OUTPATIENT
Start: 2019-03-26 | End: 2019-03-26

## 2019-03-26 RX ORDER — SODIUM CHLORIDE, SODIUM LACTATE, POTASSIUM CHLORIDE, CALCIUM CHLORIDE 600; 310; 30; 20 MG/100ML; MG/100ML; MG/100ML; MG/100ML
INJECTION, SOLUTION INTRAVENOUS
Status: DISCONTINUED | OUTPATIENT
Start: 2019-03-26 | End: 2019-03-26 | Stop reason: SURG

## 2019-03-26 RX ORDER — POLYETHYLENE GLYCOL 3350 17 G/17G
1 POWDER, FOR SOLUTION ORAL 2 TIMES DAILY PRN
Status: DISCONTINUED | OUTPATIENT
Start: 2019-03-26 | End: 2019-05-01

## 2019-03-26 RX ORDER — TRANEXAMIC ACID 100 MG/ML
INJECTION, SOLUTION INTRAVENOUS PRN
Status: DISCONTINUED | OUTPATIENT
Start: 2019-03-26 | End: 2019-03-26 | Stop reason: SURG

## 2019-03-26 RX ORDER — DIPHENHYDRAMINE HYDROCHLORIDE 50 MG/ML
12.5 INJECTION INTRAMUSCULAR; INTRAVENOUS
Status: DISCONTINUED | OUTPATIENT
Start: 2019-03-26 | End: 2019-03-26 | Stop reason: HOSPADM

## 2019-03-26 RX ORDER — ONDANSETRON 2 MG/ML
INJECTION INTRAMUSCULAR; INTRAVENOUS PRN
Status: DISCONTINUED | OUTPATIENT
Start: 2019-03-26 | End: 2019-03-26 | Stop reason: SURG

## 2019-03-26 RX ORDER — ONDANSETRON 2 MG/ML
4 INJECTION INTRAMUSCULAR; INTRAVENOUS EVERY 4 HOURS PRN
Status: DISCONTINUED | OUTPATIENT
Start: 2019-03-26 | End: 2019-05-01

## 2019-03-26 RX ORDER — ACETAMINOPHEN 10 MG/ML
1000 INJECTION, SOLUTION INTRAVENOUS EVERY 8 HOURS
Status: COMPLETED | OUTPATIENT
Start: 2019-03-26 | End: 2019-03-26

## 2019-03-26 RX ORDER — PHENYLEPHRINE HYDROCHLORIDE 10 MG/ML
INJECTION, SOLUTION INTRAMUSCULAR; INTRAVENOUS; SUBCUTANEOUS PRN
Status: DISCONTINUED | OUTPATIENT
Start: 2019-03-26 | End: 2019-03-26 | Stop reason: SURG

## 2019-03-26 RX ORDER — SODIUM CHLORIDE, SODIUM LACTATE, POTASSIUM CHLORIDE, CALCIUM CHLORIDE 600; 310; 30; 20 MG/100ML; MG/100ML; MG/100ML; MG/100ML
INJECTION, SOLUTION INTRAVENOUS CONTINUOUS
Status: DISCONTINUED | OUTPATIENT
Start: 2019-03-26 | End: 2019-03-26 | Stop reason: HOSPADM

## 2019-03-26 RX ORDER — ENEMA 19; 7 G/133ML; G/133ML
1 ENEMA RECTAL
Status: DISCONTINUED | OUTPATIENT
Start: 2019-03-26 | End: 2019-05-01

## 2019-03-26 RX ORDER — CEFAZOLIN SODIUM 1 G/3ML
INJECTION, POWDER, FOR SOLUTION INTRAMUSCULAR; INTRAVENOUS PRN
Status: DISCONTINUED | OUTPATIENT
Start: 2019-03-26 | End: 2019-03-26 | Stop reason: SURG

## 2019-03-26 RX ORDER — DEXAMETHASONE SODIUM PHOSPHATE 4 MG/ML
INJECTION, SOLUTION INTRA-ARTICULAR; INTRALESIONAL; INTRAMUSCULAR; INTRAVENOUS; SOFT TISSUE PRN
Status: DISCONTINUED | OUTPATIENT
Start: 2019-03-26 | End: 2019-03-26 | Stop reason: SURG

## 2019-03-26 RX ORDER — AMOXICILLIN 250 MG
1 CAPSULE ORAL NIGHTLY
Status: DISCONTINUED | OUTPATIENT
Start: 2019-03-26 | End: 2019-04-27

## 2019-03-26 RX ORDER — DEXAMETHASONE SODIUM PHOSPHATE 4 MG/ML
4 INJECTION, SOLUTION INTRA-ARTICULAR; INTRALESIONAL; INTRAMUSCULAR; INTRAVENOUS; SOFT TISSUE
Status: DISCONTINUED | OUTPATIENT
Start: 2019-03-26 | End: 2019-04-12

## 2019-03-26 RX ORDER — IBUPROFEN 800 MG/1
400 TABLET ORAL EVERY 8 HOURS
Status: DISPENSED | OUTPATIENT
Start: 2019-03-26 | End: 2019-03-29

## 2019-03-26 RX ADMIN — LIDOCAINE HYDROCHLORIDE 40 MG: 20 INJECTION, SOLUTION INFILTRATION; PERINEURAL at 11:01

## 2019-03-26 RX ADMIN — CEFAZOLIN 1 G: 330 INJECTION, POWDER, FOR SOLUTION INTRAMUSCULAR; INTRAVENOUS at 10:54

## 2019-03-26 RX ADMIN — ACETAMINOPHEN 1000 MG: 10 INJECTION, SOLUTION INTRAVENOUS at 22:00

## 2019-03-26 RX ADMIN — OXYBUTYNIN CHLORIDE 5 MG: 5 TABLET, EXTENDED RELEASE ORAL at 05:31

## 2019-03-26 RX ADMIN — ACETAMINOPHEN 1000 MG: 10 INJECTION, SOLUTION INTRAVENOUS at 16:25

## 2019-03-26 RX ADMIN — Medication 1000 MG: at 10:39

## 2019-03-26 RX ADMIN — PHENYLEPHRINE HYDROCHLORIDE 100 MCG: 10 INJECTION INTRAVENOUS at 11:35

## 2019-03-26 RX ADMIN — METOPROLOL TARTRATE 12.5 MG: 25 TABLET, FILM COATED ORAL at 17:23

## 2019-03-26 RX ADMIN — EPINEPHRINE 0.01 MG: 1 INJECTION INTRAMUSCULAR; INTRAVENOUS; SUBCUTANEOUS at 11:35

## 2019-03-26 RX ADMIN — CEFAZOLIN 1 G: 330 INJECTION, POWDER, FOR SOLUTION INTRAMUSCULAR; INTRAVENOUS at 11:01

## 2019-03-26 RX ADMIN — MIDAZOLAM HYDROCHLORIDE 0.5 MG: 1 INJECTION, SOLUTION INTRAMUSCULAR; INTRAVENOUS at 11:01

## 2019-03-26 RX ADMIN — MORPHINE SULFATE 2 MG: 4 INJECTION INTRAVENOUS at 22:43

## 2019-03-26 RX ADMIN — PHENYLEPHRINE HYDROCHLORIDE 100 MCG: 10 INJECTION INTRAVENOUS at 11:14

## 2019-03-26 RX ADMIN — EPINEPHRINE 0.01 MG: 1 INJECTION INTRAMUSCULAR; INTRAVENOUS; SUBCUTANEOUS at 11:55

## 2019-03-26 RX ADMIN — ROCURONIUM BROMIDE 32 MG: 10 INJECTION, SOLUTION INTRAVENOUS at 11:01

## 2019-03-26 RX ADMIN — ONDANSETRON 2 MG: 2 INJECTION INTRAMUSCULAR; INTRAVENOUS at 11:01

## 2019-03-26 RX ADMIN — DEXAMETHASONE SODIUM PHOSPHATE 2 MG: 4 INJECTION, SOLUTION INTRAMUSCULAR; INTRAVENOUS at 11:01

## 2019-03-26 RX ADMIN — EPINEPHRINE 0.01 MG: 1 INJECTION INTRAMUSCULAR; INTRAVENOUS; SUBCUTANEOUS at 11:14

## 2019-03-26 RX ADMIN — IBUPROFEN 400 MG: 800 TABLET, FILM COATED ORAL at 14:50

## 2019-03-26 RX ADMIN — PHENYLEPHRINE HYDROCHLORIDE 100 MCG: 10 INJECTION INTRAVENOUS at 11:55

## 2019-03-26 RX ADMIN — OXYCODONE HYDROCHLORIDE 2.5 MG: 5 TABLET ORAL at 20:05

## 2019-03-26 RX ADMIN — PROPOFOL 80 MG: 10 INJECTION, EMULSION INTRAVENOUS at 11:01

## 2019-03-26 RX ADMIN — DOCUSATE SODIUM 100 MG: 100 CAPSULE, LIQUID FILLED ORAL at 17:23

## 2019-03-26 RX ADMIN — SENNOSIDES AND DOCUSATE SODIUM 1 TABLET: 8.6; 5 TABLET ORAL at 21:00

## 2019-03-26 RX ADMIN — SODIUM CHLORIDE, POTASSIUM CHLORIDE, SODIUM LACTATE AND CALCIUM CHLORIDE: 600; 310; 30; 20 INJECTION, SOLUTION INTRAVENOUS at 11:01

## 2019-03-26 RX ADMIN — METOPROLOL TARTRATE 12.5 MG: 25 TABLET, FILM COATED ORAL at 05:31

## 2019-03-26 RX ADMIN — FENTANYL CITRATE 25 MCG: 50 INJECTION, SOLUTION INTRAMUSCULAR; INTRAVENOUS at 11:01

## 2019-03-26 RX ADMIN — PROPOFOL 150 MCG/KG/MIN: 10 INJECTION, EMULSION INTRAVENOUS at 10:56

## 2019-03-26 RX ADMIN — SODIUM CHLORIDE: 9 INJECTION, SOLUTION INTRAVENOUS at 23:45

## 2019-03-26 RX ADMIN — TRANEXAMIC ACID 1 G: 100 INJECTION, SOLUTION INTRAVENOUS at 11:01

## 2019-03-26 RX ADMIN — MAGNESIUM SULFATE IN WATER 0.5 G: 40 INJECTION, SOLUTION INTRAVENOUS at 11:16

## 2019-03-26 ASSESSMENT — COGNITIVE AND FUNCTIONAL STATUS - GENERAL
MOBILITY SCORE: 9
TOILETING: A LOT
TURNING FROM BACK TO SIDE WHILE IN FLAT BAD: A LOT
DAILY ACTIVITIY SCORE: 12
DRESSING REGULAR LOWER BODY CLOTHING: A LOT
SUGGESTED CMS G CODE MODIFIER DAILY ACTIVITY: CL
STANDING UP FROM CHAIR USING ARMS: A LOT
CLIMB 3 TO 5 STEPS WITH RAILING: TOTAL
PERSONAL GROOMING: A LOT
HELP NEEDED FOR BATHING: A LOT
MOVING TO AND FROM BED TO CHAIR: A LOT
DRESSING REGULAR UPPER BODY CLOTHING: A LOT
MOVING FROM LYING ON BACK TO SITTING ON SIDE OF FLAT BED: UNABLE
SUGGESTED CMS G CODE MODIFIER MOBILITY: CM
EATING MEALS: A LOT
WALKING IN HOSPITAL ROOM: TOTAL

## 2019-03-26 ASSESSMENT — PATIENT HEALTH QUESTIONNAIRE - PHQ9
SUM OF ALL RESPONSES TO PHQ9 QUESTIONS 1 AND 2: 0
1. LITTLE INTEREST OR PLEASURE IN DOING THINGS: NOT AT ALL
2. FEELING DOWN, DEPRESSED, IRRITABLE, OR HOPELESS: NOT AT ALL

## 2019-03-26 ASSESSMENT — ENCOUNTER SYMPTOMS
NAUSEA: 0
FEVER: 0
PALPITATIONS: 0
CHILLS: 0
HEARTBURN: 0
TINGLING: 0
VOMITING: 0
TREMORS: 0
MYALGIAS: 1
SENSORY CHANGE: 0

## 2019-03-26 ASSESSMENT — PAIN SCALES - WONG BAKER: WONGBAKER_NUMERICALRESPONSE: DOESN'T HURT AT ALL

## 2019-03-26 ASSESSMENT — PAIN SCALES - GENERAL: PAIN_LEVEL: 4

## 2019-03-26 NOTE — H&P
ORTHOPEDIC CONSULTATION    REASON FOR CONSULTATION:  Hip fracture.    HISTORY:  A 89-year-old patient, history of renal aneurysm, altered mental   status, fell, brought to the emergency department, admitted to medicine with a   diagnosis of a left hip fracture.  Her caretaker admits that she does walk   and was found unresponsive at the residence.  She was brought by EMS to the   emergency department.  Her evaluation demonstrated acute femoral neck fracture   and no other significant orthopedic related problems.    REVIEW OF SYSTEMS:  Essentially negative orthopedically.    She has no significant other related problems.    PHYSICAL EXAMINATION:  VITAL SIGNS:  Stable.  HEAD, EARS, EYES, NOSE AND THROAT:  ____ acute infection.  CHEST:  Clear.  MUSCULOSKELETAL:  She has left leg, which is short and externally rotated,   distal pulses are intact.  I see no other significant orthopedic related   problems.  Her weight is 96.  She is cachectic.  Her O2 sat is 90.    LABORATORY DATA:  Her laboratory data show a hemoglobin of 15, hematocrit of   46.  I see no other significant abnormalities.  Her INR is 1.  Her PT is 14.    IMAGING:  X-rays demonstrated acute left femoral neck fracture, displaced.    PLAN:  The patient is a candidate for hemiarthroplasty.  I tried to tell her   about the risks and benefits of surgery.  I am not sure if she completely   understood.  There are no family members here to discuss her case with as   well.  We will proceed with surgery probably a 2-physician signature for   consent.  I see no other significant contraindications to surgery.       ____________________________________     MD EMIL TORRES / PIERO    DD:  03/25/2019 18:13:29  DT:  03/25/2019 19:25:15    D#:  [id number]  Job#:  [id number]

## 2019-03-26 NOTE — CARE PLAN
Problem: Communication  Goal: The ability to communicate needs accurately and effectively will improve  Outcome: PROGRESSING SLOWER THAN EXPECTED  Pt currently oriented only to self, reorientation attempted. Pt educated on the need to call for assistance.    Problem: Safety  Goal: Will remain free from falls  Outcome: PROGRESSING AS EXPECTED  Discussed fall precautions with pt. Pt verbalizes understanding, but may benefit from reorientation. Bed in lowest position and locked. Bed alarm in use. Pt wearing non-slip socks, call light within reach.

## 2019-03-26 NOTE — OR NURSING
Patient has a hx of dementia and is oriented to self only. No next of kin or power of  on file. Father chandni contacted regarding surgery. Therefore, two physician consent obtained for surgery and anesthesia.

## 2019-03-26 NOTE — H&P
Hospital Medicine History & Physical Note    Date of Service  3/25/2019    Primary Care Physician  Pcp Pt States None    Consultants  Orthopedic surgery    Code Status  DNR/DNI    Chief Complaint  Fall/syncope    History of Presenting Illness  89 y.o. female who presented 3/25/2019 with past medical history of Parkinson's disease, dementia, osteoporosis, paroxysmal atrial fibrillation, is coming today due to a fall/syncope apparently patient was found on the floor by caretaker, after a neighbor called caretaker to check on patient, apparently she had some on the right coffee-ground emesis on her Angiocath and she was unresponsive and confused when she was found on the floor, patient in route to ER was found to have atrial fibrillation with RVR, initial workup evaluation in the ER showed leukocytosis, lactic acidosis,  Due to concern for possible sepsis patient had pan CT that showed left-sided neck femoral fracture but no other acute findings, patient received broad-spectrum antibiotics, when I saw the patient she was feeling better, she was able to tell me that last night she went to bed without any complaints and this morning she woke up feeling fine but when she was getting ready to get dressed she somehow fell and she could not get up she believes that she tripped on something and she did not lost her consciousness she remembers that somebody was calling her and she had to drag herself to try to open the door, she denies any chest pain shortness of breath palpitations dizziness or lightheadedness, she is states that she has been eating and drinking fluids normally denies any fever chills cough and flulike symptoms no abdominal pain diarrhea or constipation no focal weakness numbness or tingling, patient was found to be hypotensive with systolic blood pressure in 89 by EMS she received 200 cc bolus and her blood pressure improved to 134/73, at this time patient is going to be admitted to telemetry,  echocardiogram, will start low-dose metoprolol, orthopedic surgery has been consulted, will check pro-calcitonin, if pro-calcitonin is elevated we will continue IV antibiotics.  Patient is on Florinef since he is having hypertension will hold it for now.          Review of Systems  Review of Systems   Constitutional: Negative for chills and fever.   HENT: Negative for congestion and sinus pain.    Eyes: Negative for blurred vision and double vision.   Respiratory: Negative for cough and hemoptysis.    Cardiovascular: Negative for chest pain, palpitations and orthopnea.   Gastrointestinal: Negative for heartburn and nausea.   Genitourinary: Negative for dysuria and urgency.   Musculoskeletal: Positive for joint pain (Left hip). Negative for myalgias.   Skin: Negative for itching.   Neurological: Negative for dizziness and headaches.   Endo/Heme/Allergies: Does not bruise/bleed easily.   Psychiatric/Behavioral: Negative for depression and suicidal ideas.       Past Medical History   has a past medical history of Chest pain, atypical (8/21/2012); Dementia; Left arm numbness (8/21/2012); OSTEOPOROSIS (8/21/2012); and Syncope (8/21/2012).    Surgical History  No recent surgeries    Family History  family history includes No Known Problems in her father and mother.     Social History   reports that she quit smoking about 34 years ago. She has never used smokeless tobacco. She reports that she does not drink alcohol or use drugs.    Allergies  No Known Allergies    Medications  Prior to Admission Medications   Prescriptions Last Dose Informant Patient Reported? Taking?   acetaminophen (TYLENOL) 500 MG Tab PRN at PRN Patient's Home Pharmacy Yes Yes   Sig: Take 500-1,000 mg by mouth every 6 hours as needed.   fludrocortisone (FLORINEF) 0.1 MG Tab 3/25/2019 at AM Patient's Home Pharmacy Yes Yes   Sig: Take 0.1 mg by mouth every day.   oxybutynin SR (DITROPAN-XL) 5 MG TABLET SR 24 HR 3/25/2019 at AM Patient's Home Pharmacy Yes  Yes   Sig: Take 5 mg by mouth every day.      Facility-Administered Medications: None       Physical Exam  Temp:  [35.9 °C (96.6 °F)] 35.9 °C (96.6 °F)  Pulse:  [] 116  Resp:  [15-18] 18  BP: (134)/(73) 134/73  SpO2:  [90 %-95 %] 92 %    Physical Exam   Constitutional: She appears well-nourished. No distress.   HENT:   Head: Normocephalic and atraumatic.   Mouth/Throat: Mucous membranes are pale and dry.   Eyes: Conjunctivae are normal. No scleral icterus.   Neck: Normal range of motion. Neck supple. No JVD present.   Cardiovascular: Regular rhythm and normal heart sounds.    Pulmonary/Chest: Effort normal and breath sounds normal. No stridor. No respiratory distress. She has no wheezes. She has no rales.   Abdominal: Soft. Bowel sounds are normal. She exhibits no distension. There is no tenderness. There is no rebound.   Musculoskeletal: Normal range of motion. She exhibits tenderness (Left leg) and deformity (Left leg).   Lymphadenopathy:     She has no cervical adenopathy.   Neurological: She is alert. She exhibits normal muscle tone.   Oriented x2-3   Skin: Skin is dry. She is not diaphoretic. No erythema.   Psychiatric: She has a normal mood and affect. Her behavior is normal.   Nursing note and vitals reviewed.      Laboratory:  Recent Labs      03/25/19   1206   WBC  16.3*   RBC  5.03   HEMOGLOBIN  15.3   HEMATOCRIT  46.1   MCV  91.7   MCH  30.4   MCHC  33.2*   RDW  44.0   PLATELETCT  233   MPV  10.2     Recent Labs      03/25/19   1206   SODIUM  139   POTASSIUM  3.5*   CHLORIDE  98   CO2  24   GLUCOSE  182*   BUN  18   CREATININE  0.91   CALCIUM  9.9     Recent Labs      03/25/19   1206   ALTSGPT  15   ASTSGOT  35   ALKPHOSPHAT  112*   TBILIRUBIN  0.9   LIPASE  3*   GLUCOSE  182*     Recent Labs      03/25/19   1206   APTT  26.9   INR  1.09     Recent Labs      03/25/19   1206   BNPBTYPENAT  515*         Recent Labs      03/25/19   1206   TROPONINI  0.03       Urinalysis:    Recent Labs      03/25/19    1325   SPECGRAVITY  1.014   GLUCOSEUR  250*   KETONES  Negative   NITRITE  Negative   LEUKESTERAS  Negative   WBCURINE  0-2   RBCURINE  2-5*   BACTERIA  Negative   EPITHELCELL  Negative        Imaging:  CT-CHEST,ABDOMEN,PELVIS WITH   Final Result      1.  Acute left femoral neck fracture is identified.      2.  Fluid-containing structures in the pelvis bilaterally are identified. These could represent urinary bladder diverticula versus ovarian cysts or adnexal cysts.      3.  No solid organ injury identified.      4.  Small renal cysts are noted.      5.  Gallbladder appears distended and may contain a gallstone.      6.  Emphysema and areas of fibrosis are noted in the chest.      7.  Extensive atherosclerosis is noted throughout the aorta and its branch vessels.         CT-HEAD W/O   Final Result         NO ACUTE ABNORMALITIES ARE NOTED ON CT SCAN OF THE HEAD.      Findings are consistent with atrophy.  Decreased attenuation in the periventricular white matter likely indicates microvascular ischemic disease.      DX-FEMUR-2+ LEFT   Final Result      Left femoral neck fracture with displacement      DX-PELVIS-1 OR 2 VIEWS   Final Result      1.  Acute left femoral neck fracture with displacement      2.  Right superior and inferior pubic rami fracture, acuity indeterminate      DX-CHEST-PORTABLE (1 VIEW)   Final Result      1.  Interstitial densities which could be fibrosis or edema      2.  Air lucency projecting over the left lung base which could be a contusion or much less likely etiology such as diaphragmatic injury. CT recommended for further assessment      EC-ECHOCARDIOGRAM COMPLETE W/O CONT    (Results Pending)         Assessment/Plan:  I anticipate this patient will require at least two midnights for appropriate medical management, necessitating inpatient admission.    * Fall- (present on admission)   Assessment & Plan    Unclear etiology although patient does state that it was a mechanical fall, continue  telemetry, continue trending troponins, patient probably was dehydrated her lactic acid was elevated on admission this has improved after fluid resuscitation.  Will need PT OT evaluation     Closed fracture of neck of left femur (HCC)- (present on admission)   Assessment & Plan    After a fall, had CT hip showing Acute left femoral neck fracture is identified, order has been consulted.     Lactic acidosis- (present on admission)   Assessment & Plan    Most likely due to dehydration, patient had pan CT that did not show signs of infection, lactic acid is improving after IV hydration, continue monitoring.     Hyperglycemia- (present on admission)   Assessment & Plan    Will check A1c     Hypokalemia- (present on admission)   Assessment & Plan    Replacing     Leukocytosis- (present on admission)   Assessment & Plan    Probably reactive, no signs of infection.     Late onset Alzheimer's disease without behavioral disturbance- (present on admission)   Assessment & Plan    Stable, apparently patient was confused on arrival to ER she was able to give some information when I saw her, continue telemetry monitoring.     Paroxysmal atrial fibrillation (HCC)- (present on admission)   Assessment & Plan    Patient was found to be on A. fib with RVR, will get echocardiogram, started on low-dose metoprolol, patient most probably is not good candidate for anticoagulation due to risk of fall and age     Hypercholesterolemia- (present on admission)   Assessment & Plan    Lipid panel in the a.m.         VTE prophylaxis: scd's

## 2019-03-26 NOTE — ASSESSMENT & PLAN NOTE
Most likely due to dehydration, patient had pan CT that did not show signs of infection, lactic acid is improving after IV hydration, continue monitoring.  Resolved.

## 2019-03-26 NOTE — CARE PLAN
Problem: Nutritional:  Goal: Achieve adequate nutritional intake  Advance diet as feasible and patient will consume ~50% of meals  Outcome: NOT MET

## 2019-03-26 NOTE — ANESTHESIA QCDR
2019 [facility] Clinical Data Registry (for Quality Improvement)     Postoperative nausea/vomiting risk protocol (Adult = 18 yrs and Pediatric 3-17 yrs)- (430 and 463)  General inhalation anesthetic (NOT TIVA) with PONV risk factors: No  Provision of anti-emetic therapy with at least 2 different classes of agents: N/A  Patient DID NOT receive anti-emetic therapy and reason is documented in Medical Record: N/A    Multimodal Pain Management- (AQI59)  Patient undergoing Elective Surgery (i.e. Outpatient, or ASC, or Prescheduled Surgery prior to Hospital Admission): No  Use of Multimodal Pain Management, two or more drugs and/or interventions, NOT including systemic opioids: N/A  Exception: Documented allergy to multiple classes of analgesics: N/A    PACU assessment of acute postoperative pain prior to Anesthesia Care End- Applies to Patients Age = 18- (ABG7)  Initial PACU pain score is which of the following: < 7/10  Patient unable to report pain score: N/A    Post-anesthetic transfer of care checklist/protocol to PACU/ICU- (426 and 427)  Upon conclusion of case, patient transferred to which of the following locations: PACU/Non-ICU  Use of transfer checklist/protocol: Yes  Exclusion: Service Performed in Patient Hospital Room (and thus did not require transfer): N/A    PACU Reintubation- (AQI31)  General anesthesia requiring endotracheal intubation (ETT) along with subsequent extubation in OR or PACU: Yes  Required reintubation in the PACU: No   Extubation was a planned trial documented in the medical record prior to removal of the original airway device:  N/A    Unplanned admission to ICU related to anesthesia service up through end of PACU care- (MD51)  Unplanned admission to ICU (not initially anticipated at anesthesia start time): No

## 2019-03-26 NOTE — OR NURSING
Left hip mepilex silver dressing CDI. Ice pack applied, abductor pillow in place. Palpable pulses to left foot, pt able to move toes. Denies pain or nausea. Tolerating small sips. 10L oxymask and po carbs per ERAS protocol started at 1300.

## 2019-03-26 NOTE — PROGRESS NOTES
Assumed care of pt, bedside report received from TERRY Duron. Pt sleeping in bed, NPO since MN for ortho surg this AM. Per noc RN pt requires CHG bath and is oriented to self only so consent will be done with 2 physicians in pre op. Will get CHG bath done prior to transport to surg at 0845. Bed low and locked, call light within reach. Will monitor.

## 2019-03-26 NOTE — THERAPY
"Speech Language Therapy Clinical Swallow Evaluation completed.  Functional Status: Clinical swallow evaluation completed this date. Pt is alert, oriented to self only, confused. Pt on 10L O2 via oxymask per surgical protocol; RN ok'd removal of oxymask for eval as pt had normal O2 sats on room air prior to surgery. Pt able to follow most 1-step directives with breakdown noted with 2-step directives. Therefore, parts of oral mech completed, but not in full. No gross oral motor deficits identified. Pt has natural dentition. Pt presented with ice chips, NTL via tsp/cup, thins via tsp, purees, soft solids. Oral phase was mildly prolonged for soft solids with increased WOB noted. Pt reported \"yes\" when asked if chewing was tiring. Oral phase WFL for all other trials presented. Pharyngeal swallow response was delayed ~3-4 seconds. Hyolaryngeal excursion palpated as weak but complete. Double swallows noted across PO intake, concerning for pharyngeal residue. Increased belching noted upon completion of trials, concerning for possible reflux. Immediate coughing noted with thins via tsp and ice chips. No s/sx of aspiration noted with purees/nectars. Oxymask replaced upon completion of eval. Recommend initiating diet of Dysphagia 1/NTL with 1:1 supervision/feeding A. Please hold PO with any s/sx of aspiration/change in status. SLP to follow.     Recommendations - Diet: Dysphagia 1/Nectar thick liquid                           Strategies: Strict 1:1 feeding , No Straws and Head of Bed at 90 Degrees                          Medication Administration: crushed in puree   Plan of Care: Will benefit from Speech Therapy 3 times per week  Post-Acute Therapy: Recommend inpatient transitional care services for continued speech therapy services.        See \"Rehab Therapy-Acute\" Patient Summary Report for complete documentation.       "

## 2019-03-26 NOTE — ASSESSMENT & PLAN NOTE
Patient was found to be on A. fib with RVR, echocardiogram indicate LV EF of 75% and mild MR and TR.  Continue on low-dose metoprolol hold for low blood pressure, no further adjustments needed at this time

## 2019-03-26 NOTE — PROGRESS NOTES
Transported pt to Pre OP with transport staff via bed on Zol monitor. Updated pt's POA father Jossue Burk [phone] about pt's surgery this AM and informed that surgeons may be calling him to get consent signed. Pt aware of plan, remains oriented to self only. CHG bath completed before transport.

## 2019-03-26 NOTE — DIETARY
"Nutrition services: Day 1 of admit.  Rosalba Wagner is a 89 y.o. female with admitting DX of a-fib and femur fracture.   Pt noted with poor PO intake and a low BMI (17.56), however it is worth noting that BMI is based off of a stated weight - please obtain new measured wt as feasible.  RD attempted to speak with pt at bedside however pt and bed are out of room - pt currently down for surgery.  RD to follow-up as appropriate and will continue to monitor.     Assessment:  Height: 157.5 cm (5' 2\")  Weight: 43.5 kg (96 lb) - stated wt.   Body mass index is 17.56 kg/m². - Underweight.   Diet/Intake: NPO x 1.     Evaluation:   1. Pt noted with fall, hyperglycemia, late onset Alzheimer's disease without behavioral disturbance, and hypercholesteremia.   2. Per H&P, pt has a hx of Parkinson's disease, dementia, and osteoporosis.  3. Per chart review, pt weighed 44.7 kg via stand up scale on 10/20 admit.  This is a 2.7% wt loss over the past 5-6 months, however current wt is a stated wt - question accuracy.   4. Labs: Glucose: 120.  5. Meds: Kdur, Senokot, NS infusion @ 50 mL/hr.     Malnutrition Risk: No risk identified at this time - RN continues to monitor.     Recommendations/Plan:  1. Advance diet as feasible.   2. Monitor weight - please obtain new measured wt.   3. RD to obtain supplement order as needed.     RD following.             "

## 2019-03-26 NOTE — ASSESSMENT & PLAN NOTE
After a fall, had CT hip showing Acute left femoral neck fracture   Patient underwent left hemiarthroplasty on 3/26/2019.  Continue PT/OT , currently a moderate assist for walker use and ambulation  Staples removed 4/9 and steri strips placed  Awaiting placement  -Pain appears well-controlled, continue current medication regimen  -Difficult disposition ensues

## 2019-03-26 NOTE — ANESTHESIA TIME REPORT
Anesthesia Start and Stop Event Times     Date Time Event    3/26/2019 1054 Anesthesia Start        Responsible Staff  03/26/19    Name Role Begin End    Akash Rodriguez M.D. Anesth 1054         Preop Diagnosis (Free Text):  Pre-op Diagnosis     LEFT HIP FRACTURE.         Preop Diagnosis (Codes):  Diagnosis Information     Diagnosis Code(s):         Post op Diagnosis  Left displaced femoral neck fracture (HCC)      Premium Reason  Non-Premium    Comments:

## 2019-03-26 NOTE — PROGRESS NOTES
Pt returned from OR on 10L oxy mask, per ERAS protocol . Pt awake and alert to self. Dressing to L hip CDI. Placed back on tele monitor. Will monitor.

## 2019-03-26 NOTE — ANESTHESIA PREPROCEDURE EVALUATION
Relevant Problems   (+) Paroxysmal atrial fibrillation (HCC)       Physical Exam    Airway - unable to assess  Neck ROM: limited       Cardiovascular    Dental - normal exam    Unable to assess dental     Pulmonary   Breath sounds clear to auscultation     Abdominal    Neurological - abnormal exam                 Anesthesia Plan    ASA 3   ASA physical status 3 criteria: respiratory insufficiency or compromise and MI or angina - history (> 3 months)    Plan - general       Airway plan will be ETT                  Informed Consent:

## 2019-03-26 NOTE — ED NOTES
Tele floor alerted to Pt readiness for transport to floor. Pt continues to rest quietly, NAD noted. Monitoring equipment in place.

## 2019-03-26 NOTE — ANESTHESIA POSTPROCEDURE EVALUATION
Patient: Rosalba Wagner    Procedure Summary     Date:  03/26/19 Room / Location:  [place] [address] / SURGERY [facility]    Anesthesia Start:  1054 Anesthesia Stop:      Procedure:  HEMIARTHROPLASTY, HIP - FEMORAL NECK (Left Hip) Diagnosis:  (LEFT HIP FRACTURE. )    Surgeon:  Arsenio Lewis M.D. Responsible Provider:  Akash Rodriguez M.D.    Anesthesia Type:  general ASA Status:  3          Final Anesthesia Type: general  Last vitals  BP   Blood Pressure : (!) 168/67, NIBP: 159/71    Temp   36.6 °C (97.9 °F)    Pulse   Pulse: 70, Heart Rate (Monitored): 94   Resp   16    SpO2   95 %      Anesthesia Post Evaluation    Patient location during evaluation: PACU  Patient participation: complete - patient participated  Level of consciousness: sleepy but conscious  Pain score: 4    Airway patency: patent  Anesthetic complications: no  Cardiovascular status: hemodynamically stable  Respiratory status: acceptable  Hydration status: euvolemic    PONV: none           Nurse Pain Score: 0 (NPRS)

## 2019-03-26 NOTE — PROGRESS NOTES
Orthopaedics  Patient seen and examined-consult dictated  Left hip fracture for hemiarthroplasty  Will need 2 physician consent  Joshua

## 2019-03-26 NOTE — ASSESSMENT & PLAN NOTE
Unclear etiology although patient does state that it was a mechanical fall, continue telemetry, patient probably was dehydrated her lactic acid was elevated on admission this has improved after fluid resuscitation.   Fall precautions.  PT/OT.

## 2019-03-26 NOTE — ED NOTES
Pt sitting up in bed, is able to recall that she had a fall and was picked up by several people. Reoriented Pt to hospital and event. Call light within reach, will continue to monitor.

## 2019-03-26 NOTE — ASSESSMENT & PLAN NOTE
-Had an episode of agitation this morning refusing turns and required Seroquel 50 mg x 1.  Otherwise doing well  4/26 Reached out to DPOA to discuss goals of care  -Continue Risperdal 0.5 mg every evening and Seroquel as needed, no change to current medication regimen

## 2019-03-26 NOTE — PROGRESS NOTES
Bedside report received from TERRY Zuleta. Patient arrived to floor with myself from ED. Pt transferred from [facility] to bed via slide. Pt currently only oriented to self. Plan of care discussed with patient, verbalized understanding. Patient currently reports no pain at this time. Pt maintaining O2 saturation >90% on room air. Pt placed on tele monitor, monitor room notified.

## 2019-03-26 NOTE — ANESTHESIA PROCEDURE NOTES
Airway  Date/Time: 3/26/2019 11:01 AM  Performed by: ALBINO FLANAGAN  Authorized by: ALBINO FLANAGAN     Location:  OR  Urgency:  Elective  Indications for Airway Management:  Anesthesia  Spontaneous Ventilation: absent    Sedation Level:  Deep  Preoxygenated: Yes    Patient Position:  Sniffing  Final Airway Type:  Endotracheal airway  Final Endotracheal Airway:  ETT  Cuffed: Yes    Technique Used for Successful ETT Placement:  Video laryngoscopy  Devices/Methods Used in Placement:  Anterior pressure/BURP  Insertion Site:  Oral  Blade Type:  Katz  Laryngoscope Blade/Videolaryngoscope Blade Size:  3  ETT Size (mm):  7.0  Measured from:  Teeth  ETT to Teeth (cm):  21  Placement Verified by: auscultation and capnometry    Cormack-Lehane Classification:  Grade I - full view of glottis  Number of Attempts at Approach:  1

## 2019-03-26 NOTE — PROGRESS NOTES
· 2 RN skin check complete with TERRY Loja.  · Devices in place: Davis catheter.  · Skin assessed: ears pink and blanching. Elbows red and blanching. Sacrum red and slow to flavia. Heels red and blanching. Abrasions and bruising noted on bilateral lower extremities. Open skin tear noted on right lower extremity.  · Confirmed pressure ulcers found on none.  · New potential pressure ulcers noted on none. Wound consult placed and wound reported.  · The following interventions in place: Pt placed on waffle mattress, pillows in use for support and positioning..

## 2019-03-26 NOTE — OP REPORT
DATE OF SERVICE:  03/26/2019    PREOPERATIVE DIAGNOSIS:  Displaced geriatric femoral neck fracture.    POSTOPERATIVE DIAGNOSIS:  Displaced geriatric femoral neck fracture.    OPERATION PERFORMED:  Cemented hemiarthroplasty.    SURGEON:  Arsenio Lewis MD    ASSISTANT:  Carlitos Shannon DO    INDICATIONS FOR THE OPERATION:  Hip fracture in an elderly patient who is   brought to surgery now for stabilization.    BLOOD LOSS:  75 mL    MEDICATIONS UTILIZED:  Ancef, tranexamic acid.    COMPLICATIONS:  None.    ANESTHESIA:  General -- Wark -- local infiltration of essence.    PREOPERATIVE CONSENT AND FAMILY CONFERENCE:  Patient seen today   preoperatively.  Risks, benefits, alternatives all explained.  Patient   consented for full surgical undertaking.  I answered all her questions.    OPERATION IN DETAIL:  The patient was brought to the operating room, awake,   alert, placed on the operating room table, lateral position, and routine for   total hip arthroplasty.  The hip was then shaved, scrubbed, prepped and draped   in normal sterile routine fashion.  Time-out, spacesuits were utilized.  The   door was marked arthroplasty case.  Posterior approach was utilized.    Subcutaneous tissue dissected down the tensor, which was opened.  The bursal   tissue was bluntly dissected off of the rotators and then in 1 large cut, we   excised the capsule rotators along the course of the piriformis tagged and   then made our femoral neck cut 1.5 cm above the palpable lesser trochanter.    Now that the head was easily removed, the cup was examined.  There was no   arthritis or loose bodies or debris or bone debris from the fracture.    Now ____ lateralized, reamed and broached shows a 12 and cemented the 10 stem.    I copiously irrigated the intramedullary canal plug distally, dried   compulsively and then cemented our 10 collared stem in about 20 degrees of   anteversion.    At the completion of procedure, the cement was dried.  We  assembled our   bipolar 48, checked the cup again and then reduced the hip and the operation   was completed.  Good stability and leg lengths are equal.  We now brought our   thick capsule and external rotators.  There was 1 mass up through the   trochanter and then a second stitch near the ____ tight capsular repair.  It   was irrigated with Betadine dilute solution, dried with saline and then tensor   fascia with #1 Vicryl, 2-0 subQ, skin staples, sterile compression dressing,   and the patient was taken to recovery room in stable condition.  No   intraoperative or immediate postop complications.  Patient tolerated the   procedure well.       ____________________________________     MD EMIL TORRES / PIERO    DD:  03/26/2019 12:16:42  DT:  03/26/2019 13:04:35    D#:  [id number]  Job#:  [id number]

## 2019-03-26 NOTE — OR SURGEON
Immediate Post OP Note    PreOp Diagnosis: geriatric hip fracture displaced femoral neck fracture    PostOp Diagnosis: same    Procedure(s):  HEMIARTHROPLASTY, HIP - FEMORAL NECK - Wound Class: Clean    Surgeon(s):  KRISTIN Ulrich M.D. surgeon    Anesthesiologist/Type of Anesthesia:  Anesthesiologist: Akash Rodriguez M.D./General    Surgical Staff:  Circulator: DUANE Freeman Circulator: Anamaria Santo Scrub: Colleen Springer  Scrub Person: Kaylyn Mims  First Assist: Orquidea Gaona    Specimens removed if any:  * No specimens in log *    Estimated Blood Loss: 75ccs    Findings: as described    Complications: none        3/26/2019 12:22 PM Arsenio Lewis M.D.

## 2019-03-27 ENCOUNTER — APPOINTMENT (OUTPATIENT)
Dept: RADIOLOGY | Facility: MEDICAL CENTER | Age: 84
DRG: 470 | End: 2019-03-27
Attending: ORTHOPAEDIC SURGERY
Payer: MEDICARE

## 2019-03-27 ENCOUNTER — APPOINTMENT (OUTPATIENT)
Dept: CARDIOLOGY | Facility: MEDICAL CENTER | Age: 84
DRG: 470 | End: 2019-03-27
Attending: HOSPITALIST
Payer: MEDICARE

## 2019-03-27 PROCEDURE — 770001 HCHG ROOM/CARE - MED/SURG/GYN PRIV*

## 2019-03-27 PROCEDURE — 700112 HCHG RX REV CODE 229: Performed by: ORTHOPAEDIC SURGERY

## 2019-03-27 PROCEDURE — 700111 HCHG RX REV CODE 636 W/ 250 OVERRIDE (IP): Performed by: HOSPITALIST

## 2019-03-27 PROCEDURE — A9270 NON-COVERED ITEM OR SERVICE: HCPCS | Performed by: ORTHOPAEDIC SURGERY

## 2019-03-27 PROCEDURE — 99233 SBSQ HOSP IP/OBS HIGH 50: CPT | Performed by: FAMILY MEDICINE

## 2019-03-27 PROCEDURE — 92526 ORAL FUNCTION THERAPY: CPT

## 2019-03-27 PROCEDURE — 97166 OT EVAL MOD COMPLEX 45 MIN: CPT

## 2019-03-27 PROCEDURE — 97162 PT EVAL MOD COMPLEX 30 MIN: CPT

## 2019-03-27 PROCEDURE — 700102 HCHG RX REV CODE 250 W/ 637 OVERRIDE(OP): Performed by: HOSPITALIST

## 2019-03-27 PROCEDURE — 700102 HCHG RX REV CODE 250 W/ 637 OVERRIDE(OP): Performed by: ORTHOPAEDIC SURGERY

## 2019-03-27 PROCEDURE — A9270 NON-COVERED ITEM OR SERVICE: HCPCS | Performed by: HOSPITALIST

## 2019-03-27 PROCEDURE — 72170 X-RAY EXAM OF PELVIS: CPT

## 2019-03-27 RX ADMIN — ACETAMINOPHEN 1000 MG: 500 TABLET ORAL at 22:07

## 2019-03-27 RX ADMIN — MORPHINE SULFATE 2 MG: 4 INJECTION INTRAVENOUS at 02:37

## 2019-03-27 RX ADMIN — OXYBUTYNIN CHLORIDE 5 MG: 5 TABLET, EXTENDED RELEASE ORAL at 06:35

## 2019-03-27 RX ADMIN — SENNOSIDES AND DOCUSATE SODIUM 1 TABLET: 8.6; 5 TABLET ORAL at 22:07

## 2019-03-27 RX ADMIN — ACETAMINOPHEN 1000 MG: 500 TABLET ORAL at 06:35

## 2019-03-27 RX ADMIN — IBUPROFEN 400 MG: 800 TABLET, FILM COATED ORAL at 22:07

## 2019-03-27 RX ADMIN — IBUPROFEN 400 MG: 800 TABLET, FILM COATED ORAL at 06:34

## 2019-03-27 RX ADMIN — DOCUSATE SODIUM 100 MG: 100 CAPSULE, LIQUID FILLED ORAL at 06:34

## 2019-03-27 ASSESSMENT — PAIN SCALES - WONG BAKER
WONGBAKER_NUMERICALRESPONSE: DOESN'T HURT AT ALL
WONGBAKER_NUMERICALRESPONSE: DOESN'T HURT AT ALL

## 2019-03-27 ASSESSMENT — GAIT ASSESSMENTS
ASSISTIVE DEVICE: FRONT WHEEL WALKER
DEVIATION: STEP TO;BRADYKINETIC;SHUFFLED GAIT;DECREASED HEEL STRIKE;DECREASED TOE OFF
GAIT LEVEL OF ASSIST: MODERATE ASSIST

## 2019-03-27 ASSESSMENT — COGNITIVE AND FUNCTIONAL STATUS - GENERAL
MOVING FROM LYING ON BACK TO SITTING ON SIDE OF FLAT BED: UNABLE
MOVING TO AND FROM BED TO CHAIR: UNABLE
MOBILITY SCORE: 8
DAILY ACTIVITIY SCORE: 13
HELP NEEDED FOR BATHING: A LOT
SUGGESTED CMS G CODE MODIFIER MOBILITY: CM
CLIMB 3 TO 5 STEPS WITH RAILING: TOTAL
DRESSING REGULAR UPPER BODY CLOTHING: A LOT
SUGGESTED CMS G CODE MODIFIER DAILY ACTIVITY: CL
DRESSING REGULAR LOWER BODY CLOTHING: A LOT
STANDING UP FROM CHAIR USING ARMS: A LITTLE
PERSONAL GROOMING: A LITTLE
WALKING IN HOSPITAL ROOM: TOTAL
TURNING FROM BACK TO SIDE WHILE IN FLAT BAD: UNABLE
TOILETING: A LOT
EATING MEALS: A LOT

## 2019-03-27 NOTE — PROGRESS NOTES
Hospital Medicine Daily Progress Note    Date of Service  3/27/2019    Chief Complaint  89 y.o. female admitted 3/25/2019 with falls/syncope and hip fracture    Hospital Course    89 y.o. female who presented 3/25/2019 with past medical history of Parkinson's disease, dementia, osteoporosis, paroxysmal atrial fibrillation, admitted with fall/syncope apparently patient was found on the floor by caretaker. EMS found patient in Afib/RVR. Due to concern for possible sepsis patient had pan CT that showed left-sided neck femoral fracture but no other acute findings, patient received broad-spectrum antibiotics. Admitted to telemetry, ortho consulted for hip fracture.      Interval Problem Update  3/26--Going for hip repair this AM, will need PT/OT/SLP afterwards  3/27: Laying in bed.  Lethargic.  Open her eyes to verbal stimulus but then drift back to sleep quickly.  No acute distress noted.  Consultants/Specialty  Ortho    Code Status  DNR/DNI    Disposition  Pending, anticipate rehab needs    Review of Systems  Review of Systems   Unable to perform ROS: Dementia      Physical Exam  Temp:  [36 °C (96.8 °F)-36.6 °C (97.9 °F)] 36.6 °C (97.9 °F)  Pulse:  [53-86] 71  Resp:  [12-17] 16  BP: ()/(39-83) 146/63  SpO2:  [92 %-100 %] 98 %    Physical Exam   Constitutional: She appears lethargic. No distress.   Frail.   HENT:   Head: Normocephalic and atraumatic.   Eyes: Pupils are equal, round, and reactive to light. Conjunctivae are normal.   Neck: Normal range of motion. No JVD present.   Cardiovascular: Normal rate, regular rhythm and normal heart sounds.  Exam reveals no friction rub.    Pulmonary/Chest: Effort normal. No stridor. No respiratory distress. She has no wheezes.   Abdominal: Soft. She exhibits no distension.   Musculoskeletal: She exhibits no tenderness or deformity.   Neurological: She appears lethargic.   Skin: Skin is warm and dry. She is not diaphoretic.   Psychiatric:   Unable to assess due to lethargy     Nursing note and vitals reviewed.    Fluids    Intake/Output Summary (Last 24 hours) at 03/27/19 1357  Last data filed at 03/27/19 0830   Gross per 24 hour   Intake               50 ml   Output              360 ml   Net             -310 ml     Laboratory  Recent Labs      03/25/19   1206  03/26/19   0030   WBC  16.3*  14.8*   RBC  5.03  4.60   HEMOGLOBIN  15.3  13.8   HEMATOCRIT  46.1  41.5   MCV  91.7  90.2   MCH  30.4  30.0   MCHC  33.2*  33.3*   RDW  44.0  43.3   PLATELETCT  233  205   MPV  10.2  10.4     Recent Labs      03/25/19   1206  03/26/19   0030   SODIUM  139  141   POTASSIUM  3.5*  4.1   CHLORIDE  98  104   CO2  24  30   GLUCOSE  182*  120*   BUN  18  19   CREATININE  0.91  0.76   CALCIUM  9.9  9.6     Recent Labs      03/25/19   1206   APTT  26.9   INR  1.09     Recent Labs      03/25/19   1206   BNPBTYPENAT  515*     Recent Labs      03/26/19   0030   TRIGLYCERIDE  61   HDL  64   LDL  74       Imaging  CT-CHEST,ABDOMEN,PELVIS WITH   Final Result      1.  Acute left femoral neck fracture is identified.      2.  Fluid-containing structures in the pelvis bilaterally are identified. These could represent urinary bladder diverticula versus ovarian cysts or adnexal cysts.      3.  No solid organ injury identified.      4.  Small renal cysts are noted.      5.  Gallbladder appears distended and may contain a gallstone.      6.  Emphysema and areas of fibrosis are noted in the chest.      7.  Extensive atherosclerosis is noted throughout the aorta and its branch vessels.         CT-HEAD W/O   Final Result         NO ACUTE ABNORMALITIES ARE NOTED ON CT SCAN OF THE HEAD.      Findings are consistent with atrophy.  Decreased attenuation in the periventricular white matter likely indicates microvascular ischemic disease.      DX-FEMUR-2+ LEFT   Final Result      Left femoral neck fracture with displacement      DX-PELVIS-1 OR 2 VIEWS   Final Result      1.  Acute left femoral neck fracture with displacement       2.  Right superior and inferior pubic rami fracture, acuity indeterminate      DX-CHEST-PORTABLE (1 VIEW)   Final Result      1.  Interstitial densities which could be fibrosis or edema      2.  Air lucency projecting over the left lung base which could be a contusion or much less likely etiology such as diaphragmatic injury. CT recommended for further assessment      EC-ECHOCARDIOGRAM COMPLETE W/O CONT    (Results Pending)   DX-PELVIS-1 OR 2 VIEWS    (Results Pending)        Assessment/Plan  * Fall- (present on admission)   Assessment & Plan    Unclear etiology although patient does state that it was a mechanical fall, continue telemetry, patient probably was dehydrated her lactic acid was elevated on admission this has improved after fluid resuscitation.   Fall precautions.  PT/OT.     Closed fracture of neck of left femur (HCC)- (present on admission)   Assessment & Plan    After a fall, had CT hip showing Acute left femoral neck fracture is identified, Ortho has been consulted  Patient underwent left hemiarthroplasty on 3/26/2019.  PT/OT.  SNF pending.       Lactic acidosis- (present on admission)   Assessment & Plan    Most likely due to dehydration, patient had pan CT that did not show signs of infection, lactic acid is improving after IV hydration, continue monitoring.  Resolved.     Hyperglycemia- (present on admission)   Assessment & Plan    Will check A1c     Hypokalemia- (present on admission)   Assessment & Plan    Replacing     Leukocytosis- (present on admission)   Assessment & Plan    Probably reactive, no signs of infection.  Trending down.     Paroxysmal atrial fibrillation (HCC)- (present on admission)   Assessment & Plan    Patient was found to be on A. fib with RVR, will get echocardiogram, started on low-dose metoprolol, patient most probably is not good candidate for anticoagulation due to risk of fall and age     Late onset Alzheimer's disease without behavioral disturbance- (present on  admission)   Assessment & Plan    High risk for hospital-acquired delirium.  Avoid benzodiazepines and/or anti-cholinergic medications.  Avoid any sedatives or hypnotics.  Minimize lines.  Willfully catheter.  Avoid physical restraints.  Daily orientation     Hypercholesterolemia- (present on admission)   Assessment & Plan    Follow up lipid panel.      Plan of care discussed with multidisciplinary team during rounds.    VTE prophylaxis: scds.  Chemical anticoagulation per orthopedic.

## 2019-03-27 NOTE — PROGRESS NOTES
2 RN skin check complete with Aysha, RN    bilat ears pink and blanching  Bruising to back and L shoulder  bilat elbows are pink, bruised, slow to flavia-pillow support  Sacrum pink and blanching-ASIA  Left hip with surgical dressing-CDI  Small abrasions and bruising to bilat lower extremities, small skin tear to RLE-ASIA

## 2019-03-27 NOTE — PROGRESS NOTES
Bedside report received RN to RN with patient. Assuming care [phone].  Bilateral wrist restraints on - order current

## 2019-03-27 NOTE — THERAPY
"Pt is a 89 y/o female admitted after being found down by caregiver following GLF with subsequent L femur fx. Pt underwent L hip hemiarthoplasty, posterior approach WBAT. Pt has a hx of Parkinsons, Afib and osteoporosis. Pt unable to provide full hx regarding home environment of PLOF at this time. Pt presents to acute physical therapy with impairments in functional mobility, balance, gait, strength, safety awareness and decreased insight in to precuations which limit her ability to DC home at this time. Pt will benefit from acute PT interventions to address present impairments.     Physical Therapy Evaluation completed.   Bed Mobility:  Supine to Sit: Maximal Assist  Transfers: Sit to Stand: Moderate Assist (-> Min A with repetition)  Gait: Level Of Assist: Moderate Assist with Front-Wheel Walker       Plan of Care: Will benefit from Physical Therapy 4 times per week  Discharge Recommendations: Equipment: Will Continue to Assess for Equipment Needs. Post-acute therapy: Based on CLOF and hx gathered by chart, pt will likely require post acute placement to optimize function and safety prior to DC home.       See \"Rehab Therapy-Acute\" Patient Summary Report for complete documentation.     "

## 2019-03-27 NOTE — PROGRESS NOTES
"ORTHOPAEDIC SURGERY PROGRESS NOTE     Patient seen and examined. Lethargic, limited exam.    Blood pressure 146/63, pulse 71, temperature 36.6 °C (97.9 °F), temperature source Temporal, resp. rate 16, height 1.575 m (5' 2\"), weight 43.5 kg (96 lb), SpO2 98 %, not currently breastfeeding.    Laboratory Values  Recent Labs      03/25/19   1206  03/26/19   0030   WBC  16.3*  14.8*   RBC  5.03  4.60   HEMOGLOBIN  15.3  13.8   HEMATOCRIT  46.1  41.5   MCV  91.7  90.2   MCH  30.4  30.0   MCHC  33.2*  33.3*   RDW  44.0  43.3   PLATELETCT  233  205   MPV  10.2  10.4     Recent Labs      03/25/19   1206  03/25/19   1333  03/25/19   1642  03/26/19   0030  03/26/19   0528   SODIUM  139   --    --   141   --    POTASSIUM  3.5*   --    --   4.1   --    CHLORIDE  98   --    --   104   --    CO2  24   --    --   30   --    GLUCOSE  182*   --    --   120*   --    BUN  18   --    --   19   --    CPKTOTAL   --   593*  631*   --   847*     Recent Labs      03/25/19   1206   APTT  26.9   INR  1.09         Physical Exam  Gen: lethargic   Extremities:  LLE:   -post op dressings clean and dry, no strikethrough   -gross sensorimotor appears intact, limited exam due to patient participation    -all compartments soft and compressible, no pain with PROM    -skin warm and well-perfused, brisk capillary refill      Assessment  89F with left FNF  -s/p left hip hemiarthroplasty 3/26    Plan  Admission: inpatient  Antibiotics: periop  Analgesia: multimodal, limit narcotics  Activity/WB: WBAT LLE, PT/OT  Anticoagulation: SCDs, recommend pharm ppx per primary   Diet: advance as tolerated  Additional surgery: none planned  Disposition: appreciate social work for SNF      Carlitos Shannon DO, MSc    "

## 2019-03-27 NOTE — THERAPY
Speech Language Therapy dysphagia treatment completed.   Functional Status:    Patient seen during breakfast. Patient was lethargic, but was responsive to tactile and verbal prompts to sustain arousal. Patient oriented to self and month only. Patient presented with mildly strained voice. Presentation of PO consisted of D1/NTL meal tray. Patient took small bites of orange juice and pureed toast and sausage. Laryngeal elevation and excursion noted to be fair upon palpation. No overt s/sx of aspiration noted. Patient frequently closed her eyes and required moderate tactile and verbal prompts to open eyes. As the session progressed, she became more and more lethargic had difficulty sustaining arousal. Session was discontinued due to patient's inability to stay awake and couldn't continue to be an active participant in today's session. At this time, recommend continuation of Dysphagia I/NTL diet, and consideration of alternative source of nutrition if patient unable to sustain arousal. SLP will continue to follow for dysphagia therapy and to modify diet as deemed appropriate.    Recommendations - Diet: Dysphagia 1/Nectar thick liquids                           Strategies: Strict 1:1 feeding , No Straws and Head of Bed at 90 Degrees:  MUST BE AWAKE AND ALERT                          Medication Administration: crushed in puree   Plan of Care: Will benefit from Speech Therapy 3 times per week  Post-Acute Therapy: Recommend inpatient transitional care services for continued speech therapy services.

## 2019-03-27 NOTE — PROGRESS NOTES
Hospital Medicine Daily Progress Note    Date of Service  3/26/2019    Chief Complaint  89 y.o. female admitted 3/25/2019 with falls/syncope and hip fracture    Hospital Course    89 y.o. female who presented 3/25/2019 with past medical history of Parkinson's disease, dementia, osteoporosis, paroxysmal atrial fibrillation, admitted with fall/syncope apparently patient was found on the floor by caretaker. EMS found patient in Afib/RVR. Due to concern for possible sepsis patient had pan CT that showed left-sided neck femoral fracture but no other acute findings, patient received broad-spectrum antibiotics. Admitted to telemetry, ortho consulted for hip fracture.      Interval Problem Update  3/26--Going for hip repair this AM, will need PT/OT/SLP afterwards    Consultants/Specialty  Ortho    Code Status  DNR/DNI    Disposition  Pending, anticipate rehab needs    Review of Systems  Review of Systems   Constitutional: Negative for chills and fever.   Cardiovascular: Negative for chest pain and palpitations.   Gastrointestinal: Negative for heartburn, nausea and vomiting.   Musculoskeletal: Positive for joint pain and myalgias.   Skin: Negative for itching and rash.   Neurological: Negative for tingling, tremors and sensory change.   All other systems reviewed and are negative.     Physical Exam  Temp:  [36.1 °C (97 °F)-37.1 °C (98.8 °F)] 36.7 °C (98.1 °F)  Pulse:  [] 82  Resp:  [13-26] 17  BP: (124-183)/(55-85) 125/55  SpO2:  [92 %-100 %] 100 %    Physical Exam   Constitutional: No distress.   HENT:   Head: Normocephalic.   Mouth/Throat: Oropharynx is clear and moist.   Eyes: Conjunctivae are normal. No scleral icterus.   Neck: Normal range of motion.   Cardiovascular: Normal heart sounds and intact distal pulses.    Pulmonary/Chest: Effort normal. No stridor. No respiratory distress. She has no wheezes.   Abdominal: Soft. She exhibits no distension. There is no guarding.   Musculoskeletal: She exhibits no edema,  tenderness or deformity.   Neurological: She is alert.   Skin: Skin is warm and dry. No rash noted. She is not diaphoretic. No erythema.   Nursing note and vitals reviewed.    Fluids    Intake/Output Summary (Last 24 hours) at 03/26/19 1755  Last data filed at 03/26/19 1300   Gross per 24 hour   Intake          1461.64 ml   Output              825 ml   Net           636.64 ml     Laboratory  Recent Labs      03/25/19   1206  03/26/19   0030   WBC  16.3*  14.8*   RBC  5.03  4.60   HEMOGLOBIN  15.3  13.8   HEMATOCRIT  46.1  41.5   MCV  91.7  90.2   MCH  30.4  30.0   MCHC  33.2*  33.3*   RDW  44.0  43.3   PLATELETCT  233  205   MPV  10.2  10.4     Recent Labs      03/25/19   1206  03/26/19   0030   SODIUM  139  141   POTASSIUM  3.5*  4.1   CHLORIDE  98  104   CO2  24  30   GLUCOSE  182*  120*   BUN  18  19   CREATININE  0.91  0.76   CALCIUM  9.9  9.6     Recent Labs      03/25/19   1206   APTT  26.9   INR  1.09     Recent Labs      03/25/19   1206   BNPBTYPENAT  515*     Recent Labs      03/26/19   0030   TRIGLYCERIDE  61   HDL  64   LDL  74       Imaging  CT-CHEST,ABDOMEN,PELVIS WITH   Final Result      1.  Acute left femoral neck fracture is identified.      2.  Fluid-containing structures in the pelvis bilaterally are identified. These could represent urinary bladder diverticula versus ovarian cysts or adnexal cysts.      3.  No solid organ injury identified.      4.  Small renal cysts are noted.      5.  Gallbladder appears distended and may contain a gallstone.      6.  Emphysema and areas of fibrosis are noted in the chest.      7.  Extensive atherosclerosis is noted throughout the aorta and its branch vessels.         CT-HEAD W/O   Final Result         NO ACUTE ABNORMALITIES ARE NOTED ON CT SCAN OF THE HEAD.      Findings are consistent with atrophy.  Decreased attenuation in the periventricular white matter likely indicates microvascular ischemic disease.      DX-FEMUR-2+ LEFT   Final Result      Left femoral  neck fracture with displacement      DX-PELVIS-1 OR 2 VIEWS   Final Result      1.  Acute left femoral neck fracture with displacement      2.  Right superior and inferior pubic rami fracture, acuity indeterminate      DX-CHEST-PORTABLE (1 VIEW)   Final Result      1.  Interstitial densities which could be fibrosis or edema      2.  Air lucency projecting over the left lung base which could be a contusion or much less likely etiology such as diaphragmatic injury. CT recommended for further assessment      EC-ECHOCARDIOGRAM COMPLETE W/O CONT    (Results Pending)        Assessment/Plan  * Fall- (present on admission)   Assessment & Plan    Unclear etiology although patient does state that it was a mechanical fall, continue telemetry, patient probably was dehydrated her lactic acid was elevated on admission this has improved after fluid resuscitation.  Will need PT OT evaluation after repair.      Closed fracture of neck of left femur (HCC)- (present on admission)   Assessment & Plan    After a fall, had CT hip showing Acute left femoral neck fracture is identified, Ortho has been consulted, plan for repair today     Lactic acidosis- (present on admission)   Assessment & Plan    Most likely due to dehydration, patient had pan CT that did not show signs of infection, lactic acid is improving after IV hydration, continue monitoring.     Hyperglycemia- (present on admission)   Assessment & Plan    Will check A1c     Hypokalemia- (present on admission)   Assessment & Plan    Replacing     Leukocytosis- (present on admission)   Assessment & Plan    Probably reactive, no signs of infection.     Paroxysmal atrial fibrillation (HCC)- (present on admission)   Assessment & Plan    Patient was found to be on A. fib with RVR, will get echocardiogram, started on low-dose metoprolol, patient most probably is not good candidate for anticoagulation due to risk of fall and age     Late onset Alzheimer's disease without behavioral  disturbance- (present on admission)   Assessment & Plan    Stable, apparently patient was confused on arrival to ER she was able to give some information when I saw her, continue telemetry monitoring.     Hypercholesterolemia- (present on admission)   Assessment & Plan    Follow up lipid panel.           VTE prophylaxis: scds

## 2019-03-27 NOTE — THERAPY
"Occupational Therapy Evaluation completed.   Functional Status:    89 y/o female admitted with L hip fx following GLF, now s/p L hemiarthoplasty, WBAT, posterior precautions. Pt required Max A x2 for sup>sit, min-mod A for sit><stand, and was able to take a few steps forward and backward with facilitation, VCs, and use of FWW. She fatigues fairly quickly. Required total A to don socks, and washed face at EOB with set-up and extra time. Pt returned to supine with Max A. Pt presents with diminished activity tolerance, strength, balance, functional mobility, and decreased independence. Pt also appears to have impaired mentation at this time, though unknown baseline. Pt appeared to be neglecting L side while lying down, will assess vision in following tx. Will continue working with her in this setting.     Plan of Care: Will benefit from Occupational Therapy 4 times per week  Discharge Recommendations:  Equipment: Will Continue to Assess for Equipment Needs. Post-acute therapy Recommend inpatient transitional care services for continued occupational therapy services.       See \"Rehab Therapy-Acute\" Patient Summary Report for complete documentation.    "

## 2019-03-27 NOTE — PROGRESS NOTES
Report given to TERRY Jeff on ortho floor. Per Dr Clementina welch to [facility] tele for transfer. Will call pt's POA and caregiver to notify of move. Pt stable at time of transfer.

## 2019-03-28 ENCOUNTER — APPOINTMENT (OUTPATIENT)
Dept: CARDIOLOGY | Facility: MEDICAL CENTER | Age: 84
DRG: 470 | End: 2019-03-28
Attending: HOSPITALIST
Payer: MEDICARE

## 2019-03-28 PROBLEM — D64.9 NORMOCYTIC ANEMIA: Status: ACTIVE | Noted: 2019-03-28

## 2019-03-28 PROBLEM — R41.0 DELIRIUM: Status: ACTIVE | Noted: 2019-03-28

## 2019-03-28 LAB
ALBUMIN SERPL BCP-MCNC: 2.9 G/DL (ref 3.2–4.9)
ALBUMIN/GLOB SERPL: 1.3 G/DL
ALP SERPL-CCNC: 69 U/L (ref 30–99)
ALT SERPL-CCNC: 19 U/L (ref 2–50)
ANION GAP SERPL CALC-SCNC: 4 MMOL/L (ref 0–11.9)
AST SERPL-CCNC: 36 U/L (ref 12–45)
BASOPHILS # BLD AUTO: 0.3 % (ref 0–1.8)
BASOPHILS # BLD: 0.02 K/UL (ref 0–0.12)
BILIRUB SERPL-MCNC: 0.6 MG/DL (ref 0.1–1.5)
BUN SERPL-MCNC: 33 MG/DL (ref 8–22)
CALCIUM SERPL-MCNC: 8.7 MG/DL (ref 8.5–10.5)
CHLORIDE SERPL-SCNC: 107 MMOL/L (ref 96–112)
CO2 SERPL-SCNC: 31 MMOL/L (ref 20–33)
CREAT SERPL-MCNC: 0.73 MG/DL (ref 0.5–1.4)
EOSINOPHIL # BLD AUTO: 0.04 K/UL (ref 0–0.51)
EOSINOPHIL NFR BLD: 0.6 % (ref 0–6.9)
ERYTHROCYTE [DISTWIDTH] IN BLOOD BY AUTOMATED COUNT: 47.6 FL (ref 35.9–50)
EST. AVERAGE GLUCOSE BLD GHB EST-MCNC: 123 MG/DL
GLOBULIN SER CALC-MCNC: 2.2 G/DL (ref 1.9–3.5)
GLUCOSE SERPL-MCNC: 88 MG/DL (ref 65–99)
HBA1C MFR BLD: 5.9 % (ref 0–5.6)
HCT VFR BLD AUTO: 31.4 % (ref 37–47)
HGB BLD-MCNC: 10.1 G/DL (ref 12–16)
IMM GRANULOCYTES # BLD AUTO: 0.02 K/UL (ref 0–0.11)
IMM GRANULOCYTES NFR BLD AUTO: 0.3 % (ref 0–0.9)
LYMPHOCYTES # BLD AUTO: 1 K/UL (ref 1–4.8)
LYMPHOCYTES NFR BLD: 14 % (ref 22–41)
MCH RBC QN AUTO: 31.1 PG (ref 27–33)
MCHC RBC AUTO-ENTMCNC: 32.2 G/DL (ref 33.6–35)
MCV RBC AUTO: 96.6 FL (ref 81.4–97.8)
MONOCYTES # BLD AUTO: 0.85 K/UL (ref 0–0.85)
MONOCYTES NFR BLD AUTO: 11.9 % (ref 0–13.4)
NEUTROPHILS # BLD AUTO: 5.23 K/UL (ref 2–7.15)
NEUTROPHILS NFR BLD: 72.9 % (ref 44–72)
NRBC # BLD AUTO: 0.03 K/UL
NRBC BLD-RTO: 0.4 /100 WBC
PLATELET # BLD AUTO: 164 K/UL (ref 164–446)
PMV BLD AUTO: 10.6 FL (ref 9–12.9)
POTASSIUM SERPL-SCNC: 3.3 MMOL/L (ref 3.6–5.5)
PROT SERPL-MCNC: 5.1 G/DL (ref 6–8.2)
RBC # BLD AUTO: 3.25 M/UL (ref 4.2–5.4)
SODIUM SERPL-SCNC: 142 MMOL/L (ref 135–145)
WBC # BLD AUTO: 7.2 K/UL (ref 4.8–10.8)

## 2019-03-28 PROCEDURE — 770006 HCHG ROOM/CARE - MED/SURG/GYN SEMI*

## 2019-03-28 PROCEDURE — 306015 LOCK STAT FOLEY: Performed by: FAMILY MEDICINE

## 2019-03-28 PROCEDURE — A9270 NON-COVERED ITEM OR SERVICE: HCPCS | Performed by: ORTHOPAEDIC SURGERY

## 2019-03-28 PROCEDURE — 99232 SBSQ HOSP IP/OBS MODERATE 35: CPT | Performed by: FAMILY MEDICINE

## 2019-03-28 PROCEDURE — 36415 COLL VENOUS BLD VENIPUNCTURE: CPT

## 2019-03-28 PROCEDURE — 700112 HCHG RX REV CODE 229: Performed by: ORTHOPAEDIC SURGERY

## 2019-03-28 PROCEDURE — 700102 HCHG RX REV CODE 250 W/ 637 OVERRIDE(OP): Performed by: FAMILY MEDICINE

## 2019-03-28 PROCEDURE — A9270 NON-COVERED ITEM OR SERVICE: HCPCS | Performed by: HOSPITALIST

## 2019-03-28 PROCEDURE — 700102 HCHG RX REV CODE 250 W/ 637 OVERRIDE(OP): Performed by: ORTHOPAEDIC SURGERY

## 2019-03-28 PROCEDURE — 700102 HCHG RX REV CODE 250 W/ 637 OVERRIDE(OP): Performed by: HOSPITALIST

## 2019-03-28 PROCEDURE — A9270 NON-COVERED ITEM OR SERVICE: HCPCS | Performed by: FAMILY MEDICINE

## 2019-03-28 PROCEDURE — 85025 COMPLETE CBC W/AUTO DIFF WBC: CPT

## 2019-03-28 PROCEDURE — 80053 COMPREHEN METABOLIC PANEL: CPT

## 2019-03-28 RX ORDER — POTASSIUM CHLORIDE 20 MEQ/1
40 TABLET, EXTENDED RELEASE ORAL ONCE
Status: COMPLETED | OUTPATIENT
Start: 2019-03-28 | End: 2019-03-28

## 2019-03-28 RX ADMIN — IBUPROFEN 400 MG: 800 TABLET, FILM COATED ORAL at 22:54

## 2019-03-28 RX ADMIN — ACETAMINOPHEN 1000 MG: 500 TABLET ORAL at 22:48

## 2019-03-28 RX ADMIN — IBUPROFEN 400 MG: 800 TABLET, FILM COATED ORAL at 06:06

## 2019-03-28 RX ADMIN — METOPROLOL TARTRATE 12.5 MG: 25 TABLET, FILM COATED ORAL at 06:06

## 2019-03-28 RX ADMIN — METOPROLOL TARTRATE 12.5 MG: 25 TABLET, FILM COATED ORAL at 17:35

## 2019-03-28 RX ADMIN — ACETAMINOPHEN 1000 MG: 500 TABLET ORAL at 06:06

## 2019-03-28 RX ADMIN — SENNOSIDES AND DOCUSATE SODIUM 1 TABLET: 8.6; 5 TABLET ORAL at 22:48

## 2019-03-28 RX ADMIN — ACETAMINOPHEN 1000 MG: 500 TABLET ORAL at 17:34

## 2019-03-28 RX ADMIN — IBUPROFEN 400 MG: 800 TABLET, FILM COATED ORAL at 17:34

## 2019-03-28 RX ADMIN — POTASSIUM CHLORIDE 40 MEQ: 1500 TABLET, EXTENDED RELEASE ORAL at 17:35

## 2019-03-28 RX ADMIN — DOCUSATE SODIUM 100 MG: 100 CAPSULE, LIQUID FILLED ORAL at 06:06

## 2019-03-28 NOTE — THERAPY
Attempted therapy follow up session this pm. Per discussion with Rn pt has become combative this afternoon and is now is restraints. Rn reported okay  to hold on therapy at this time. Will follow up as appropriate.

## 2019-03-28 NOTE — CARE PLAN
Problem: Nutritional:  Goal: Achieve adequate nutritional intake  Advance diet as feasible and patient will consume ~50% of meals   Outcome: PROGRESSING SLOWER THAN EXPECTED  Per chart, pt with varied intake, most meals <25%.  RD reviewed snacks and nutritional supplements at this time and will continue to monitor.

## 2019-03-28 NOTE — CARE PLAN
Problem: Safety  Goal: Will remain free from injury  Outcome: PROGRESSING SLOWER THAN EXPECTED  Pt is confused, trying to pull out her aguayo and IV, currently in non-violent restraints to protect her from injuries. Q1h checks with pain/injury assessments.    Problem: Infection  Goal: Will remain free from infection  Outcome: PROGRESSING AS EXPECTED  Incision site monitored, aguayo care performed.    Problem: Skin Integrity  Goal: Risk for impaired skin integrity will decrease  Outcome: PROGRESSING AS EXPECTED  Q2h turns with skin assessments.

## 2019-03-28 NOTE — PROGRESS NOTES
"ORTHOPAEDIC SURGERY PROGRESS NOTE     Patient seen and examined. Pain appears controlled. Confused.     Blood pressure 149/53, pulse 64, temperature 36.7 °C (98.1 °F), temperature source Temporal, resp. rate 16, height 1.575 m (5' 2\"), weight 43.5 kg (96 lb), SpO2 100 %, not currently breastfeeding.    Laboratory Values  Recent Labs      03/25/19   1206  03/26/19   0030  03/28/19   0424   WBC  16.3*  14.8*  7.2   RBC  5.03  4.60  3.25*   HEMOGLOBIN  15.3  13.8  10.1*   HEMATOCRIT  46.1  41.5  31.4*   MCV  91.7  90.2  96.6   MCH  30.4  30.0  31.1   MCHC  33.2*  33.3*  32.2*   RDW  44.0  43.3  47.6   PLATELETCT  233  205  164   MPV  10.2  10.4  10.6     Recent Labs      03/25/19   1206  03/25/19   1333  03/25/19   1642  03/26/19   0030  03/26/19   0528  03/28/19   0424   SODIUM  139   --    --   141   --   142   POTASSIUM  3.5*   --    --   4.1   --   3.3*   CHLORIDE  98   --    --   104   --   107   CO2  24   --    --   30   --   31   GLUCOSE  182*   --    --   120*   --   88   BUN  18   --    --   19   --   33*   CPKTOTAL   --   593*  631*   --   847*   --      Recent Labs      03/25/19   1206   APTT  26.9   INR  1.09         Physical Exam  Gen: no acute distress, nontoxic  Extremities:  LLE:   -dressings clean and dry, no strikethrough   -SILT DP/SP/saph/sural nerves   -+TA/EHL/GCS/Peroneals   -all compartments soft and compressible, no pain with PROM    -skin warm and well-perfused, brisk capillary refill      Assessment  89F status post hemiarthroplasty L hip 3/26    Plan  Admission: inpatient  Antibiotics: periop  Analgesia: multimodal   Activity/WB: WBAT  Anticoagulation: per primary   Diet: advance as tolerated  Additional surgery: none planned  Disposition: dispo per primary       Carlitos Shannon DO, MSc    "

## 2019-03-28 NOTE — ASSESSMENT & PLAN NOTE
Patient was agitated and confused.  She required physical restraints.  This likely combination of stress-induced on body from overall medical condition with potential adverse events of medications in a patient who has advanced dementia.  Avoid any anticholinergic medications or benzodiazepines.  Avoid any hypnotics or sedatives.  The patient is on physical restraints.  Minimize lines.  Avoid/discontinue Davis catheter as possible.   More cooperative. Mental status near baseline.

## 2019-03-29 ENCOUNTER — APPOINTMENT (OUTPATIENT)
Dept: CARDIOLOGY | Facility: MEDICAL CENTER | Age: 84
DRG: 470 | End: 2019-03-29
Attending: HOSPITALIST
Payer: MEDICARE

## 2019-03-29 PROBLEM — E44.0 MODERATE PROTEIN-CALORIE MALNUTRITION (HCC): Status: ACTIVE | Noted: 2019-03-29

## 2019-03-29 PROBLEM — E87.0 HYPERNATREMIA: Status: ACTIVE | Noted: 2019-03-29

## 2019-03-29 PROBLEM — R13.10 DYSPHAGIA: Status: ACTIVE | Noted: 2019-03-29

## 2019-03-29 LAB
ALBUMIN SERPL BCP-MCNC: 2.9 G/DL (ref 3.2–4.9)
ALBUMIN/GLOB SERPL: 1.2 G/DL
ALP SERPL-CCNC: 66 U/L (ref 30–99)
ALT SERPL-CCNC: 18 U/L (ref 2–50)
ANION GAP SERPL CALC-SCNC: 9 MMOL/L (ref 0–11.9)
AST SERPL-CCNC: 28 U/L (ref 12–45)
BILIRUB SERPL-MCNC: 0.6 MG/DL (ref 0.1–1.5)
BUN SERPL-MCNC: 29 MG/DL (ref 8–22)
CALCIUM SERPL-MCNC: 8.8 MG/DL (ref 8.5–10.5)
CHLORIDE SERPL-SCNC: 111 MMOL/L (ref 96–112)
CO2 SERPL-SCNC: 29 MMOL/L (ref 20–33)
CREAT SERPL-MCNC: 0.56 MG/DL (ref 0.5–1.4)
GLOBULIN SER CALC-MCNC: 2.4 G/DL (ref 1.9–3.5)
GLUCOSE SERPL-MCNC: 101 MG/DL (ref 65–99)
LV EJECT FRACT  99904: 70
LV EJECT FRACT MOD 2C 99903: 79.29
LV EJECT FRACT MOD 4C 99902: 75.61
LV EJECT FRACT MOD BP 99901: 77.39
POTASSIUM SERPL-SCNC: 3.9 MMOL/L (ref 3.6–5.5)
PROT SERPL-MCNC: 5.3 G/DL (ref 6–8.2)
SODIUM SERPL-SCNC: 149 MMOL/L (ref 135–145)

## 2019-03-29 PROCEDURE — 770006 HCHG ROOM/CARE - MED/SURG/GYN SEMI*

## 2019-03-29 PROCEDURE — 92526 ORAL FUNCTION THERAPY: CPT

## 2019-03-29 PROCEDURE — 97530 THERAPEUTIC ACTIVITIES: CPT

## 2019-03-29 PROCEDURE — A9270 NON-COVERED ITEM OR SERVICE: HCPCS | Performed by: ORTHOPAEDIC SURGERY

## 2019-03-29 PROCEDURE — 36415 COLL VENOUS BLD VENIPUNCTURE: CPT

## 2019-03-29 PROCEDURE — 700105 HCHG RX REV CODE 258: Performed by: FAMILY MEDICINE

## 2019-03-29 PROCEDURE — 93306 TTE W/DOPPLER COMPLETE: CPT

## 2019-03-29 PROCEDURE — 700111 HCHG RX REV CODE 636 W/ 250 OVERRIDE (IP): Performed by: FAMILY MEDICINE

## 2019-03-29 PROCEDURE — 80053 COMPREHEN METABOLIC PANEL: CPT

## 2019-03-29 PROCEDURE — 700102 HCHG RX REV CODE 250 W/ 637 OVERRIDE(OP): Performed by: ORTHOPAEDIC SURGERY

## 2019-03-29 PROCEDURE — 93306 TTE W/DOPPLER COMPLETE: CPT | Mod: 26 | Performed by: INTERNAL MEDICINE

## 2019-03-29 PROCEDURE — 51798 US URINE CAPACITY MEASURE: CPT

## 2019-03-29 PROCEDURE — 99233 SBSQ HOSP IP/OBS HIGH 50: CPT | Performed by: FAMILY MEDICINE

## 2019-03-29 PROCEDURE — 700102 HCHG RX REV CODE 250 W/ 637 OVERRIDE(OP): Performed by: HOSPITALIST

## 2019-03-29 PROCEDURE — A9270 NON-COVERED ITEM OR SERVICE: HCPCS | Performed by: HOSPITALIST

## 2019-03-29 PROCEDURE — 700112 HCHG RX REV CODE 229: Performed by: ORTHOPAEDIC SURGERY

## 2019-03-29 RX ORDER — DEXTROSE MONOHYDRATE 50 MG/ML
INJECTION, SOLUTION INTRAVENOUS CONTINUOUS
Status: DISCONTINUED | OUTPATIENT
Start: 2019-03-29 | End: 2019-03-31

## 2019-03-29 RX ORDER — HEPARIN SODIUM 5000 [USP'U]/ML
5000 INJECTION, SOLUTION INTRAVENOUS; SUBCUTANEOUS EVERY 8 HOURS
Status: DISCONTINUED | OUTPATIENT
Start: 2019-03-29 | End: 2019-05-01

## 2019-03-29 RX ORDER — DEXTROSE MONOHYDRATE 50 MG/ML
INJECTION, SOLUTION INTRAVENOUS CONTINUOUS
Status: DISCONTINUED | OUTPATIENT
Start: 2019-03-29 | End: 2019-03-29

## 2019-03-29 RX ORDER — SODIUM CHLORIDE 450 MG/100ML
INJECTION, SOLUTION INTRAVENOUS CONTINUOUS
Status: DISCONTINUED | OUTPATIENT
Start: 2019-03-29 | End: 2019-03-29

## 2019-03-29 RX ADMIN — HEPARIN SODIUM 5000 UNITS: 5000 INJECTION, SOLUTION INTRAVENOUS; SUBCUTANEOUS at 22:23

## 2019-03-29 RX ADMIN — METOPROLOL TARTRATE 12.5 MG: 25 TABLET, FILM COATED ORAL at 17:30

## 2019-03-29 RX ADMIN — IBUPROFEN 400 MG: 800 TABLET, FILM COATED ORAL at 05:10

## 2019-03-29 RX ADMIN — METOPROLOL TARTRATE 12.5 MG: 25 TABLET, FILM COATED ORAL at 05:10

## 2019-03-29 RX ADMIN — ACETAMINOPHEN 1000 MG: 500 TABLET ORAL at 22:22

## 2019-03-29 RX ADMIN — DEXTROSE MONOHYDRATE: 50 INJECTION, SOLUTION INTRAVENOUS at 13:24

## 2019-03-29 RX ADMIN — ACETAMINOPHEN 1000 MG: 500 TABLET ORAL at 05:09

## 2019-03-29 RX ADMIN — HEPARIN SODIUM 5000 UNITS: 5000 INJECTION, SOLUTION INTRAVENOUS; SUBCUTANEOUS at 17:30

## 2019-03-29 RX ADMIN — DOCUSATE SODIUM 100 MG: 100 CAPSULE, LIQUID FILLED ORAL at 05:10

## 2019-03-29 RX ADMIN — DOCUSATE SODIUM 100 MG: 100 CAPSULE, LIQUID FILLED ORAL at 18:00

## 2019-03-29 RX ADMIN — SENNOSIDES AND DOCUSATE SODIUM 1 TABLET: 8.6; 5 TABLET ORAL at 22:23

## 2019-03-29 ASSESSMENT — COGNITIVE AND FUNCTIONAL STATUS - GENERAL
MOBILITY SCORE: 6
CLIMB 3 TO 5 STEPS WITH RAILING: TOTAL
MOVING FROM LYING ON BACK TO SITTING ON SIDE OF FLAT BED: UNABLE
WALKING IN HOSPITAL ROOM: TOTAL
TURNING FROM BACK TO SIDE WHILE IN FLAT BAD: UNABLE
MOVING TO AND FROM BED TO CHAIR: UNABLE
SUGGESTED CMS G CODE MODIFIER MOBILITY: CN
STANDING UP FROM CHAIR USING ARMS: TOTAL

## 2019-03-29 ASSESSMENT — GAIT ASSESSMENTS: GAIT LEVEL OF ASSIST: REFUSED

## 2019-03-29 NOTE — PROGRESS NOTES
Hospital Medicine Daily Progress Note    Date of Service  3/29/2019    Chief Complaint  89 y.o. female admitted 3/25/2019 with falls/syncope and hip fracture    Hospital Course    89 y.o. female who presented 3/25/2019 with past medical history of Parkinson's disease, dementia, osteoporosis, paroxysmal atrial fibrillation, admitted with fall/syncope apparently patient was found on the floor by caretaker. EMS found patient in Afib/RVR. Due to concern for possible sepsis patient had pan CT that showed left-sided neck femoral fracture but no other acute findings, patient received broad-spectrum antibiotics. Admitted to telemetry, ortho consulted for hip fracture.      Interval Problem Update  3/26--Going for hip repair this AM, will need PT/OT/SLP afterwards  3/27: Laying in bed.  Lethargic.  Open her eyes to verbal stimulus but then drift back to sleep quickly.  No acute distress noted.  3/28: More awake today but confused and agitated.  Requiring physical restraints.  Disoriented to time and place.  Inattentive.  3/29: Confused.  Still requiring restraints.  Pulled her Davis catheter but no hematuria noted.  Bladder scan with no retention noted.  Failed swallow evaluation.  POA refusing any tube feeding as per patient wishes.  No acute distress noted.  Consultants/Specialty  Ortho  Palliative  Code Status  DNR/DNI    Disposition  Pending SNF acceptance.    Review of Systems  Review of Systems   Unable to perform ROS: Dementia      Physical Exam  Temp:  [36.2 °C (97.1 °F)-37 °C (98.6 °F)] 37 °C (98.6 °F)  Pulse:  [62-79] 79  Resp:  [15-17] 17  BP: (134-162)/(57-77) 138/74  SpO2:  [93 %-99 %] 96 %    Physical Exam   Constitutional: She appears lethargic. She appears cachectic. No distress.   Frail.  Confused.    HENT:   Head: Normocephalic and atraumatic.   Eyes: Pupils are equal, round, and reactive to light. Conjunctivae are normal. No scleral icterus.   Neck: Normal range of motion. No thyromegaly present.    Cardiovascular: Normal rate, regular rhythm and normal heart sounds.  Exam reveals no gallop and no friction rub.    Pulmonary/Chest: Effort normal and breath sounds normal.   Abdominal: Soft. She exhibits no distension. There is no tenderness.   Musculoskeletal: She exhibits no tenderness or deformity.   Diffuse muscle wasting   Multiple old ecchymosis and bruises on upper extremities.    Neurological: She appears lethargic. She is disoriented.   Skin: Skin is warm and dry. She is not diaphoretic.   Psychiatric: Her affect is labile. She is agitated and actively hallucinating. Cognition and memory are impaired. She expresses impulsivity. She exhibits abnormal recent memory and abnormal remote memory.   Nursing note and vitals reviewed.    Fluids    Intake/Output Summary (Last 24 hours) at 03/29/19 1628  Last data filed at 03/29/19 0500   Gross per 24 hour   Intake               30 ml   Output              500 ml   Net             -470 ml     Laboratory  Recent Labs      03/28/19   0424   WBC  7.2   RBC  3.25*   HEMOGLOBIN  10.1*   HEMATOCRIT  31.4*   MCV  96.6   MCH  31.1   MCHC  32.2*   RDW  47.6   PLATELETCT  164   MPV  10.6     Recent Labs      03/28/19   0424  03/29/19   0504   SODIUM  142  149*   POTASSIUM  3.3*  3.9   CHLORIDE  107  111   CO2  31  29   GLUCOSE  88  101*   BUN  33*  29*   CREATININE  0.73  0.56   CALCIUM  8.7  8.8                   Imaging  EC-ECHOCARDIOGRAM COMPLETE W/O CONT   Final Result      DX-PELVIS-1 OR 2 VIEWS   Final Result      Left hip arthroplasty.      CT-CHEST,ABDOMEN,PELVIS WITH   Final Result      1.  Acute left femoral neck fracture is identified.      2.  Fluid-containing structures in the pelvis bilaterally are identified. These could represent urinary bladder diverticula versus ovarian cysts or adnexal cysts.      3.  No solid organ injury identified.      4.  Small renal cysts are noted.      5.  Gallbladder appears distended and may contain a gallstone.      6.   Emphysema and areas of fibrosis are noted in the chest.      7.  Extensive atherosclerosis is noted throughout the aorta and its branch vessels.         CT-HEAD W/O   Final Result         NO ACUTE ABNORMALITIES ARE NOTED ON CT SCAN OF THE HEAD.      Findings are consistent with atrophy.  Decreased attenuation in the periventricular white matter likely indicates microvascular ischemic disease.      DX-FEMUR-2+ LEFT   Final Result      Left femoral neck fracture with displacement      DX-PELVIS-1 OR 2 VIEWS   Final Result      1.  Acute left femoral neck fracture with displacement      2.  Right superior and inferior pubic rami fracture, acuity indeterminate      DX-CHEST-PORTABLE (1 VIEW)   Final Result      1.  Interstitial densities which could be fibrosis or edema      2.  Air lucency projecting over the left lung base which could be a contusion or much less likely etiology such as diaphragmatic injury. CT recommended for further assessment           Assessment/Plan  * Fall- (present on admission)   Assessment & Plan    Unclear etiology although patient does state that it was a mechanical fall, continue telemetry, patient probably was dehydrated her lactic acid was elevated on admission this has improved after fluid resuscitation.   Fall precautions.  PT/OT.     Closed fracture of neck of left femur (HCC)- (present on admission)   Assessment & Plan    After a fall, had CT hip showing Acute left femoral neck fracture is identified, Ortho has been consulted  Patient underwent left hemiarthroplasty on 3/26/2019.  PT/OT.  SNF pending.       Hypernatremia- (present on admission)   Assessment & Plan    Started on D5W.  Continue to monitor.     Moderate protein-calorie malnutrition (HCC)- (present on admission)   Assessment & Plan    In the light of dysphagia and poor p.o. intake.  No tube feeding per POA.  Palliative consulted.     Dysphagia- (present on admission)   Assessment & Plan    Failed swallow  evaluation.  Speech following.  Per POA, patient does not want any tube feeding.  Palliative consulted.     Normocytic anemia- (present on admission)   Assessment & Plan    Monitor H&H.     Delirium- (present on admission)   Assessment & Plan    Patient is having agitation and confusion requiring physical restraints.  This likely combination of stress-induced on body from overall medical condition with potential adverse events of medications in a patient who has advanced dementia.  Avoid any anticholinergic medications or benzodiazepines.  Avoid any hypnotics or sedatives.  Utilize one-on-one sitter and avoid physical restraints.  Minimize lines.  Avoid/discontinue Davis catheter as possible.     Lactic acidosis- (present on admission)   Assessment & Plan    Most likely due to dehydration, patient had pan CT that did not show signs of infection, lactic acid is improving after IV hydration, continue monitoring.  Resolved.     Hyperglycemia- (present on admission)   Assessment & Plan    Will check A1c     Hypokalemia- (present on admission)   Assessment & Plan    Replacing     Leukocytosis- (present on admission)   Assessment & Plan    Probably reactive, no signs of infection.  Trending down.     Paroxysmal atrial fibrillation (HCC)- (present on admission)   Assessment & Plan    Patient was found to be on A. fib with RVR, will get echocardiogram, started on low-dose metoprolol, patient most probably is not good candidate for anticoagulation due to risk of fall and age     Late onset Alzheimer's disease without behavioral disturbance- (present on admission)   Assessment & Plan    High risk for hospital-acquired delirium.  Avoid benzodiazepines and/or anti-cholinergic medications.  Avoid any sedatives or hypnotics.  Minimize lines.  Willfully catheter.  Avoid physical restraints.  Daily orientation     Hypercholesterolemia- (present on admission)   Assessment & Plan    Follow up lipid panel.      Plan of care discussed  with multidisciplinary team during rounds.    VTE prophylaxis: Heparin

## 2019-03-29 NOTE — ASSESSMENT & PLAN NOTE
Initially failed swallow evaluation.  Per POA, do not place a tube feeding.  Speech therapy, diet was advanced today

## 2019-03-29 NOTE — DISCHARGE PLANNING
Care Transition Team Assessment    Information Source  Orientation : Disoriented to Event, Disoriented to Place, Disoriented to Time  Information Given By: Other (Comments) (DPOA)  Informant's Name: Father chandni Alberts  Who is responsible for making decisions for patient? : POA  Name(s) of Primary Decision Maker: Father Chandni Alberts    Readmission Evaluation  Is this a readmission?: No    Elopement Risk  Legal Hold: No  Ambulatory or Self Mobile in Wheelchair: No-Not an Elopement Risk  Elopement Risk: Not at Risk for Elopement    Interdisciplinary Discharge Planning  Lives with - Patient's Self Care Capacity: Attendant / Paid Care Giver (Alone)  Patient or legal guardian wants to designate a caregiver (see row info): No  Housing / Facility: Unable To Determine At This Time  Prior Services: Intermittent Physical Support for ADL Per Service    Discharge Preparedness  What is your plan after discharge?: Skilled nursing facility  What are your discharge supports?: Other (comment) (DPOA, friend, CG)  Prior Functional Level: Ambulatory, Needs Assist with Activities of Daily Living, Needs Assist with Medication Management  Difficulity with IADLs: Cooking, Driving, Keeping track of finances, Managing medication    Functional Assesment  Prior Functional Level: Ambulatory, Needs Assist with Activities of Daily Living, Needs Assist with Medication Management    Finances  Financial Barriers to Discharge: No  Prescription Coverage: Yes              Advance Directive  Advance Directive?: DPOA for Health Care  Durable Power of  Name and Contact : Father chandni alberts         Psychological Assessment  History of Substance Abuse: None  History of Psychiatric Problems: No  Non-compliant with Treatment: No  Newly Diagnosed Illness: No         Anticipated Discharge Information  Anticipated discharge disposition: SNF

## 2019-03-29 NOTE — PROGRESS NOTES
Bedside report received from night nurse, dede noted to be pulled out by patient with balloon inflated. Paging MD to notify. No bleeding or signs of trauma. Patient is disoriented and mumbling incoherently, but denies pain.

## 2019-03-29 NOTE — DISCHARGE PLANNING
Received Choice form at [phone]  Agency/Facility Name: [facility]3 [facility]  Referral sent per Choice form @ [phone]

## 2019-03-29 NOTE — DISCHARGE PLANNING
LSW received card from [facility] JORGE Yuan on desk. [phone]. JORGE Rosenthal said that the care giver just told the maintenance ck to check on the  Pt when she could not get in the home. JORGE Rosenthal said she will have [facility] JORGE Mancilla call this LSW as she may have family contact information and that a  may be a possible POA.

## 2019-03-29 NOTE — PROGRESS NOTES
"ORTHOPAEDIC SURGERY PROGRESS NOTE     Patient seen and examined. No acute events.     Blood pressure 134/77, pulse 73, temperature 36.2 °C (97.1 °F), temperature source Temporal, resp. rate 15, height 1.575 m (5' 2\"), weight 43.5 kg (96 lb), SpO2 97 %, not currently breastfeeding.    Laboratory Values  Recent Labs      03/28/19   0424   WBC  7.2   RBC  3.25*   HEMOGLOBIN  10.1*   HEMATOCRIT  31.4*   MCV  96.6   MCH  31.1   MCHC  32.2*   RDW  47.6   PLATELETCT  164   MPV  10.6     Recent Labs      03/28/19   0424  03/29/19   0504   SODIUM  142  149*   POTASSIUM  3.3*  3.9   CHLORIDE  107  111   CO2  31  29   GLUCOSE  88  101*   BUN  33*  29*             Physical Exam  Extremities:  LLE:   -dressings clean and dry   -SILT DP/SP/saph/sural nerves   -+TA/EHL/GCS/Peroneals   -all compartments soft and compressible, no pain with PROM    -skin warm and well-perfused, brisk capillary refill      Assessment  89F status post hemiarthroplasty L hip 3/26    Plan  Admission: inpatient  Antibiotics: periop  Analgesia: multimodal   Activity/WB: WBAT  Anticoagulation: per primary   Diet: advance as tolerated  Additional surgery: none planned  Disposition: dispo per primary, will require SNF      Carlitos Shannon DO, MSc    "

## 2019-03-29 NOTE — THERAPY
"Speech Language Therapy dysphagia treatment completed.   Functional Status:  Pt seen for dysphagia tx this date. Pt lethargic but maintained alertness and was responsive to verbal/tactile cues throughout session. Pt oriented to self only. Per RN and EMR, pt with limited PO intake. Pt's medical POA Toro present for 2nd half of session. Pt presented with ice chips x3, thin water via 1/2 tsp x1, NTL via tsp x3, purees via 1/2 tsp x3. Oral phase mildly prolonged. Swallow response was delayed ~4-5s. Hyolaryngeal excursion palpated as reduced. Immediate throat clearing and delayed coughing occurred in 100% of instances with all textures, concerning for aspiration. Pt then became increasingly lethargic, falling back asleep so feeding was discontinued. Discussed pt's status with CG (medical POA, Toro), who verbalized good understanding of risk of aspiration and wants to continue having pt eat despite risk as her advanced directives indicate should would not want a feeding tube. Discussed POC with RN as well as pt appears appropriate for palliative care consult. Recommend continuing D1/NTL with 1:1 supervision despite risk.     Recommendations: continuing D1/NTL with 1:1 supervision despite risk. Consider Palliative care consult.     Plan of Care: Will benefit from Speech Therapy 3 times per week  Post-Acute Therapy: depends on POC/Palliative consult.     See \"Rehab Therapy-Acute\" Patient Summary Report for complete documentation.     "

## 2019-03-29 NOTE — ASSESSMENT & PLAN NOTE
Encourage oral intake with puree diet, no tube feeds per POA request  Poor PO intake-no obvious change at this time and unlikely to improve

## 2019-03-29 NOTE — DISCHARGE PLANNING
Anticipated Discharge Disposition: SNF    Action: LSW received a VM from Pt DPOA Father Jossue Mustaf[place]ouston who stated he was told by last nights RN that this LSW would call him back to discuss his concerns. LSW contact Father Jossue who stated that is biggest concern is that the Pt is not being fed. Father Jossue stated that yesterday the food was brought to her but the Pt is in restraints and he was told that they would help the Pt eat however when I came back a few hours later the Pt food tray had not been touched. Father Jossue stated that this morning they sent a volunteer to see the Pt and her food tray was sitting untouched but that the Pt ate all of her food when the volunteer fed the Pt. LSW received SNF choice [facility]. [facility]. [facility]. [facility]. LSW had CCA scan in DPOA paperwork.     Barriers to Discharge: SNF acceptance    Plan: follow up with RN charge on DPOA concerns

## 2019-03-29 NOTE — DISCHARGE PLANNING
Anticipated Discharge Disposition: SNf    Action: LSW received a VM from Pt DPOA Father Jossue stating he received a call from Dr. uLcia asking about putting in a NG tube. Father Jossue stated he spoke with the alternate health agent and they have decided against an NG tube and are going with the provisions 3 and 4 on the DPOA and would like the Pt fed to the degree that she can eat. LSW spoke with Dr. Lucia in regards the VM the DPOA left. Dr. Lucia said he would contact the DPOA.    Barriers to Discharge: SNF acceptance    Plan: follow up with SNF referrals

## 2019-03-30 LAB
BACTERIA BLD CULT: NORMAL
BACTERIA BLD CULT: NORMAL
SIGNIFICANT IND 70042: NORMAL
SIGNIFICANT IND 70042: NORMAL
SITE SITE: NORMAL
SITE SITE: NORMAL
SOURCE SOURCE: NORMAL
SOURCE SOURCE: NORMAL

## 2019-03-30 PROCEDURE — 700105 HCHG RX REV CODE 258: Performed by: FAMILY MEDICINE

## 2019-03-30 PROCEDURE — A9270 NON-COVERED ITEM OR SERVICE: HCPCS | Performed by: HOSPITALIST

## 2019-03-30 PROCEDURE — A9270 NON-COVERED ITEM OR SERVICE: HCPCS | Performed by: INTERNAL MEDICINE

## 2019-03-30 PROCEDURE — 302146: Performed by: INTERNAL MEDICINE

## 2019-03-30 PROCEDURE — 700112 HCHG RX REV CODE 229: Performed by: ORTHOPAEDIC SURGERY

## 2019-03-30 PROCEDURE — A9270 NON-COVERED ITEM OR SERVICE: HCPCS | Performed by: ORTHOPAEDIC SURGERY

## 2019-03-30 PROCEDURE — 700102 HCHG RX REV CODE 250 W/ 637 OVERRIDE(OP): Performed by: ORTHOPAEDIC SURGERY

## 2019-03-30 PROCEDURE — 700111 HCHG RX REV CODE 636 W/ 250 OVERRIDE (IP): Performed by: FAMILY MEDICINE

## 2019-03-30 PROCEDURE — 700102 HCHG RX REV CODE 250 W/ 637 OVERRIDE(OP): Performed by: HOSPITALIST

## 2019-03-30 PROCEDURE — 700102 HCHG RX REV CODE 250 W/ 637 OVERRIDE(OP): Performed by: INTERNAL MEDICINE

## 2019-03-30 PROCEDURE — 770006 HCHG ROOM/CARE - MED/SURG/GYN SEMI*

## 2019-03-30 PROCEDURE — 99232 SBSQ HOSP IP/OBS MODERATE 35: CPT | Performed by: INTERNAL MEDICINE

## 2019-03-30 RX ORDER — RISPERIDONE 0.5 MG/1
0.5 TABLET ORAL 2 TIMES DAILY
Status: DISCONTINUED | OUTPATIENT
Start: 2019-03-30 | End: 2019-05-01

## 2019-03-30 RX ADMIN — RISPERIDONE 0.5 MG: 0.5 TABLET, FILM COATED ORAL at 16:44

## 2019-03-30 RX ADMIN — HEPARIN SODIUM 5000 UNITS: 5000 INJECTION, SOLUTION INTRAVENOUS; SUBCUTANEOUS at 05:03

## 2019-03-30 RX ADMIN — DEXTROSE MONOHYDRATE: 50 INJECTION, SOLUTION INTRAVENOUS at 21:43

## 2019-03-30 RX ADMIN — ACETAMINOPHEN 1000 MG: 500 TABLET ORAL at 13:19

## 2019-03-30 RX ADMIN — HEPARIN SODIUM 5000 UNITS: 5000 INJECTION, SOLUTION INTRAVENOUS; SUBCUTANEOUS at 13:19

## 2019-03-30 RX ADMIN — METOPROLOL TARTRATE 12.5 MG: 25 TABLET, FILM COATED ORAL at 16:43

## 2019-03-30 RX ADMIN — METOPROLOL TARTRATE 12.5 MG: 25 TABLET, FILM COATED ORAL at 05:03

## 2019-03-30 RX ADMIN — ACETAMINOPHEN 1000 MG: 500 TABLET ORAL at 21:40

## 2019-03-30 RX ADMIN — DOCUSATE SODIUM 100 MG: 100 CAPSULE, LIQUID FILLED ORAL at 05:02

## 2019-03-30 RX ADMIN — HEPARIN SODIUM 5000 UNITS: 5000 INJECTION, SOLUTION INTRAVENOUS; SUBCUTANEOUS at 21:47

## 2019-03-30 RX ADMIN — DEXTROSE MONOHYDRATE: 50 INJECTION, SOLUTION INTRAVENOUS at 04:14

## 2019-03-30 RX ADMIN — ACETAMINOPHEN 1000 MG: 500 TABLET ORAL at 05:02

## 2019-03-30 NOTE — PROGRESS NOTES
"   Orthopaedic PA Progress Note    Interval changes: Resting comfortably POD # 4 S/P L hip Tuan Dr. Lewis. OK for transfer from Ortho standpoint    ROS - Patient denies any new issues. No chest pain, dyspnea, or fever.  Pain well controlled.    Blood pressure 149/70, pulse 64, temperature 36.2 °C (97.2 °F), temperature source Temporal, resp. rate 16, height 1.575 m (5' 2\"), weight 43.5 kg (96 lb), SpO2 98 %, not currently breastfeeding.    Patient seen and examined  No acute distress  Breathing non labored  RRR  Surgical dressing is clean, dry, and intact. Patient clearly fires tibialis anterior, EHL, and gastrocnemius/soleus. Sensation is intact to light touch throughout superficial peroneal, deep peroneal, tibial, saphenous, and sural nerve distributions. Strong and palpable 2+ dorsalis pedis and posterior tibial pulses with capillary refill less than 2 seconds. No lower leg tenderness or discomfort.    Recent Labs      03/28/19   0424   WBC  7.2   RBC  3.25*   HEMOGLOBIN  10.1*   HEMATOCRIT  31.4*   MCV  96.6   MCH  31.1   MCHC  32.2*   RDW  47.6   PLATELETCT  164   MPV  10.6     Active Hospital Problems    Diagnosis   • Closed fracture of neck of left femur (HCC) [S72.002A]     Priority: High   • Late onset Alzheimer's disease without behavioral disturbance [G30.1, F02.80]     Priority: Low   • Hypercholesterolemia [E78.00]     Priority: Low   • Dysphagia [R13.10]   • Moderate protein-calorie malnutrition (HCC) [E44.0]   • Hypernatremia [E87.0]   • Delirium [R41.0]   • Normocytic anemia [D64.9]   • Fall [W19.XXXA]   • Leukocytosis [D72.829]   • Hypokalemia [E87.6]   • Hyperglycemia [R73.9]   • Lactic acidosis [E87.2]   • Paroxysmal atrial fibrillation (HCC) [I48.0]       Assessment/Plan:  POD#4 S/P L hip tuan  Wt bearing status - AT  PT/OT-initiated  Wound care:dressing left in place  Drains - no  Davis-no  Sutures/Staples out- 10-14 days post operatively  Antibiotics: complete  DVT Prophylaxis- TEDS/SCDs/Foot " pumps.   UFH 5000u q8h Duration-until ambulatory > 150'  Future Procedures - not anticipated  Case Coordination for Discharge Planning - Disposition SNF, cleared from Ortho standpoint

## 2019-03-30 NOTE — PROGRESS NOTES
[facility] Medicine Daily Progress Note    Date of Service  3/30/2019    Chief Complaint  89 y.o. female admitted 3/25/2019 with falls/syncope and hip fracture    Hospital Course    89 y.o. female who presented 3/25/2019 with past medical history of Parkinson's disease, dementia, osteoporosis, paroxysmal atrial fibrillation, admitted with fall/syncope apparently patient was found on the floor by caretaker. EMS found patient in Afib/RVR. Due to concern for possible sepsis patient had pan CT that showed left-sided neck femoral fracture but no other acute findings, patient received broad-spectrum antibiotics. Admitted to telemetry, ortho consulted for hip fracture.      Interval Problem Update  3/26--Going for hip repair this AM, will need PT/OT/SLP afterwards  3/27: Laying in bed.  Lethargic.  Open her eyes to verbal stimulus but then drift back to sleep quickly.  No acute distress noted.  3/28: More awake today but confused and agitated.  Requiring physical restraints.  Disoriented to time and place.  Inattentive.  3/29: Confused.  Still requiring restraints.  Pulled her Davis catheter but no hematuria noted.  Bladder scan with no retention noted.  Failed swallow evaluation.  POA refusing any tube feeding as per patient wishes.  No acute distress noted.  3/30:   The patient appears calm during my examination but she has poor memory and pulled on lines and impulsive per nursing staff.  She is still on restraints.  Will start risperidone and monitor.  Will consider removal of restraints tomorrow.   Discussed with the patient's POA who is sitting at bedside.     Consultants/Specialty  Ortho  Palliative  Code Status  DNR/DNI    Disposition  Pending SNF acceptance.  Needs to remove physical restraints prior to discharge.  The patient will likely need long-term placement because the patient's prior living situation is by herself.       Review of Systems  Review of Systems   Unable to perform ROS: Dementia      Physical  Exam  Temp:  [36.1 °C (97 °F)-36.8 °C (98.3 °F)] 36.2 °C (97.2 °F)  Pulse:  [64-74] 64  Resp:  [15-17] 16  BP: (118-149)/(61-70) 149/70  SpO2:  [98 %-100 %] 98 %    Physical Exam   Constitutional: She appears cachectic. No distress.   Frail.  Confused.    HENT:   Head: Normocephalic and atraumatic.   Eyes: Pupils are equal, round, and reactive to light. Conjunctivae are normal. No scleral icterus.   Neck: Normal range of motion. No thyromegaly present.   Cardiovascular: Normal rate, regular rhythm and normal heart sounds.  Exam reveals no gallop and no friction rub.    Pulmonary/Chest: Effort normal and breath sounds normal.   Abdominal: Soft. She exhibits no distension. There is no tenderness.   Musculoskeletal: She exhibits no tenderness or deformity.   Diffuse muscle wasting   Multiple old ecchymosis and bruises on upper extremities.    Neurological: She is disoriented.   Skin: Skin is warm and dry. She is not diaphoretic.   Psychiatric: Her affect is labile. Cognition and memory are impaired. She expresses impulsivity. She exhibits abnormal recent memory and abnormal remote memory.   Nursing note and vitals reviewed.    Fluids    Intake/Output Summary (Last 24 hours) at 03/30/19 1532  Last data filed at 03/30/19 0414   Gross per 24 hour   Intake             1050 ml   Output                0 ml   Net             1050 ml     Laboratory  Recent Labs      03/28/19   0424   WBC  7.2   RBC  3.25*   HEMOGLOBIN  10.1*   HEMATOCRIT  31.4*   MCV  96.6   MCH  31.1   MCHC  32.2*   RDW  47.6   PLATELETCT  164   MPV  10.6     Recent Labs      03/28/19   0424  03/29/19   0504   SODIUM  142  149*   POTASSIUM  3.3*  3.9   CHLORIDE  107  111   CO2  31  29   GLUCOSE  88  101*   BUN  33*  29*   CREATININE  0.73  0.56   CALCIUM  8.7  8.8                   Imaging  EC-ECHOCARDIOGRAM COMPLETE W/O CONT   Final Result      DX-PELVIS-1 OR 2 VIEWS   Final Result      Left hip arthroplasty.      CT-CHEST,ABDOMEN,PELVIS WITH   Final Result       1.  Acute left femoral neck fracture is identified.      2.  Fluid-containing structures in the pelvis bilaterally are identified. These could represent urinary bladder diverticula versus ovarian cysts or adnexal cysts.      3.  No solid organ injury identified.      4.  Small renal cysts are noted.      5.  Gallbladder appears distended and may contain a gallstone.      6.  Emphysema and areas of fibrosis are noted in the chest.      7.  Extensive atherosclerosis is noted throughout the aorta and its branch vessels.         CT-HEAD W/O   Final Result         NO ACUTE ABNORMALITIES ARE NOTED ON CT SCAN OF THE HEAD.      Findings are consistent with atrophy.  Decreased attenuation in the periventricular white matter likely indicates microvascular ischemic disease.      DX-FEMUR-2+ LEFT   Final Result      Left femoral neck fracture with displacement      DX-PELVIS-1 OR 2 VIEWS   Final Result      1.  Acute left femoral neck fracture with displacement      2.  Right superior and inferior pubic rami fracture, acuity indeterminate      DX-CHEST-PORTABLE (1 VIEW)   Final Result      1.  Interstitial densities which could be fibrosis or edema      2.  Air lucency projecting over the left lung base which could be a contusion or much less likely etiology such as diaphragmatic injury. CT recommended for further assessment           Assessment/Plan  * Fall- (present on admission)   Assessment & Plan    Unclear etiology although patient does state that it was a mechanical fall, continue telemetry, patient probably was dehydrated her lactic acid was elevated on admission this has improved after fluid resuscitation.   Fall precautions.  PT/OT.     Closed fracture of neck of left femur (HCC)- (present on admission)   Assessment & Plan    After a fall, had CT hip showing Acute left femoral neck fracture is identified, Ortho has been consulted  Patient underwent left hemiarthroplasty on 3/26/2019.  PT/OT.  SNF pending.        Hypernatremia- (present on admission)   Assessment & Plan    Started on D5W.  Continue to monitor.     Moderate protein-calorie malnutrition (HCC)- (present on admission)   Assessment & Plan    In the light of dysphagia and poor p.o. intake.  No tube feeding per POA.  Palliative consulted.     Dysphagia- (present on admission)   Assessment & Plan    Failed swallow evaluation.  Speech following.  Per POA, patient does not want any tube feeding.  Palliative consulted.     Normocytic anemia- (present on admission)   Assessment & Plan    Monitor H&H. Check b12 and folate levels.     Delirium- (present on admission)   Assessment & Plan    Patient is having agitation and confusion requiring physical restraints.  This likely combination of stress-induced on body from overall medical condition with potential adverse events of medications in a patient who has advanced dementia.  Avoid any anticholinergic medications or benzodiazepines.  Avoid any hypnotics or sedatives.  The patient is on physical restraints.  Minimize lines.  Avoid/discontinue Davis catheter as possible.  Start Risperdal and will remove physical restraints.     Lactic acidosis- (present on admission)   Assessment & Plan    Most likely due to dehydration, patient had pan CT that did not show signs of infection, lactic acid is improving after IV hydration, continue monitoring.  Resolved.     Hyperglycemia- (present on admission)   Assessment & Plan    Hgb A1c is 5.9     Hypokalemia- (present on admission)   Assessment & Plan    Replacing     Leukocytosis- (present on admission)   Assessment & Plan    Probably reactive, no signs of infection.  Trending down.     Paroxysmal atrial fibrillation (HCC)- (present on admission)   Assessment & Plan    Patient was found to be on A. fib with RVR, echocardiogram indicate LV EF of 75% and mild MR and TR.  Continue on low-dose metoprolol, patient most probably is not good candidate for anticoagulation due to risk of  fall and age     Late onset Alzheimer's disease without behavioral disturbance- (present on admission)   Assessment & Plan    High risk for hospital-acquired delirium.  Avoid benzodiazepines and/or anti-cholinergic medications.  Avoid any sedatives or hypnotics.  Minimize lines.  Willfully catheter.  Daily orientation  Start on low dose Risperdal. Will consider removal of physical restraints.     Hypercholesterolemia- (present on admission)   Assessment & Plan    Total cholesterol is 150 and LDL is 74.         VTE prophylaxis: Heparin

## 2019-03-30 NOTE — CARE PLAN
Problem: Pain Management  Goal: Pain level will decrease to patient's comfort goal  Outcome: PROGRESSING AS EXPECTED  monitor for pain via FLACC scale every 4 hours/prn.

## 2019-03-30 NOTE — PROGRESS NOTES
· 2 RN skin check complete with TERRY Youssef.  · Devices in place bilat elbow dressings, silicone O2 tubing ear protectant applied.  · Skin assessed under devices CATA.  · Confirmed pressure ulcers found on N/A.  · New potential pressure ulcers noted on N/A.  · The following interventions in place Q2 repositioning, assessing for incontinence, offloaded heels on pillows, use draw sheet to reposition patient  · Will continue to monitor.

## 2019-03-30 NOTE — CARE PLAN
Problem: Skin Integrity  Goal: Risk for impaired skin integrity will decrease  Outcome: PROGRESSING AS EXPECTED  Turn and reposition patient every 2 hours, barrier cream, check wrists for breakdown because of restraints Q2 hours.

## 2019-03-31 LAB
ALBUMIN SERPL BCP-MCNC: 2.4 G/DL (ref 3.2–4.9)
ALBUMIN/GLOB SERPL: 1.3 G/DL
ALP SERPL-CCNC: 67 U/L (ref 30–99)
ALT SERPL-CCNC: 30 U/L (ref 2–50)
AMMONIA PLAS-SCNC: 31 UMOL/L (ref 11–45)
ANION GAP SERPL CALC-SCNC: 5 MMOL/L (ref 0–11.9)
AST SERPL-CCNC: 41 U/L (ref 12–45)
BASOPHILS # BLD AUTO: 0.5 % (ref 0–1.8)
BASOPHILS # BLD: 0.03 K/UL (ref 0–0.12)
BILIRUB SERPL-MCNC: 0.5 MG/DL (ref 0.1–1.5)
BUN SERPL-MCNC: 27 MG/DL (ref 8–22)
CALCIUM SERPL-MCNC: 8.3 MG/DL (ref 8.5–10.5)
CHLORIDE SERPL-SCNC: 103 MMOL/L (ref 96–112)
CO2 SERPL-SCNC: 31 MMOL/L (ref 20–33)
CREAT SERPL-MCNC: 0.5 MG/DL (ref 0.5–1.4)
EOSINOPHIL # BLD AUTO: 0.18 K/UL (ref 0–0.51)
EOSINOPHIL NFR BLD: 3.1 % (ref 0–6.9)
ERYTHROCYTE [DISTWIDTH] IN BLOOD BY AUTOMATED COUNT: 45.2 FL (ref 35.9–50)
FOLATE SERPL-MCNC: 6.2 NG/ML
GLOBULIN SER CALC-MCNC: 1.9 G/DL (ref 1.9–3.5)
GLUCOSE SERPL-MCNC: 152 MG/DL (ref 65–99)
HCT VFR BLD AUTO: 29.2 % (ref 37–47)
HGB BLD-MCNC: 9.3 G/DL (ref 12–16)
IMM GRANULOCYTES # BLD AUTO: 0.06 K/UL (ref 0–0.11)
IMM GRANULOCYTES NFR BLD AUTO: 1 % (ref 0–0.9)
LYMPHOCYTES # BLD AUTO: 1.08 K/UL (ref 1–4.8)
LYMPHOCYTES NFR BLD: 18.4 % (ref 22–41)
MAGNESIUM SERPL-MCNC: 1.6 MG/DL (ref 1.5–2.5)
MCH RBC QN AUTO: 30.2 PG (ref 27–33)
MCHC RBC AUTO-ENTMCNC: 31.8 G/DL (ref 33.6–35)
MCV RBC AUTO: 94.8 FL (ref 81.4–97.8)
MONOCYTES # BLD AUTO: 0.63 K/UL (ref 0–0.85)
MONOCYTES NFR BLD AUTO: 10.7 % (ref 0–13.4)
NEUTROPHILS # BLD AUTO: 3.89 K/UL (ref 2–7.15)
NEUTROPHILS NFR BLD: 66.3 % (ref 44–72)
NRBC # BLD AUTO: 0 K/UL
NRBC BLD-RTO: 0 /100 WBC
PLATELET # BLD AUTO: 179 K/UL (ref 164–446)
PMV BLD AUTO: 9.9 FL (ref 9–12.9)
POTASSIUM SERPL-SCNC: 3.4 MMOL/L (ref 3.6–5.5)
PROT SERPL-MCNC: 4.3 G/DL (ref 6–8.2)
RBC # BLD AUTO: 3.08 M/UL (ref 4.2–5.4)
SODIUM SERPL-SCNC: 139 MMOL/L (ref 135–145)
VIT B12 SERPL-MCNC: 345 PG/ML (ref 211–911)
WBC # BLD AUTO: 5.9 K/UL (ref 4.8–10.8)

## 2019-03-31 PROCEDURE — 80053 COMPREHEN METABOLIC PANEL: CPT

## 2019-03-31 PROCEDURE — 700111 HCHG RX REV CODE 636 W/ 250 OVERRIDE (IP): Performed by: INTERNAL MEDICINE

## 2019-03-31 PROCEDURE — 82746 ASSAY OF FOLIC ACID SERUM: CPT

## 2019-03-31 PROCEDURE — A9270 NON-COVERED ITEM OR SERVICE: HCPCS | Performed by: HOSPITALIST

## 2019-03-31 PROCEDURE — 700102 HCHG RX REV CODE 250 W/ 637 OVERRIDE(OP): Performed by: INTERNAL MEDICINE

## 2019-03-31 PROCEDURE — 99232 SBSQ HOSP IP/OBS MODERATE 35: CPT | Performed by: INTERNAL MEDICINE

## 2019-03-31 PROCEDURE — 700111 HCHG RX REV CODE 636 W/ 250 OVERRIDE (IP): Performed by: FAMILY MEDICINE

## 2019-03-31 PROCEDURE — 770006 HCHG ROOM/CARE - MED/SURG/GYN SEMI*

## 2019-03-31 PROCEDURE — 36415 COLL VENOUS BLD VENIPUNCTURE: CPT

## 2019-03-31 PROCEDURE — 700102 HCHG RX REV CODE 250 W/ 637 OVERRIDE(OP): Performed by: ORTHOPAEDIC SURGERY

## 2019-03-31 PROCEDURE — A9270 NON-COVERED ITEM OR SERVICE: HCPCS | Performed by: ORTHOPAEDIC SURGERY

## 2019-03-31 PROCEDURE — 700102 HCHG RX REV CODE 250 W/ 637 OVERRIDE(OP): Performed by: HOSPITALIST

## 2019-03-31 PROCEDURE — A9270 NON-COVERED ITEM OR SERVICE: HCPCS | Performed by: INTERNAL MEDICINE

## 2019-03-31 PROCEDURE — 85025 COMPLETE CBC W/AUTO DIFF WBC: CPT

## 2019-03-31 PROCEDURE — 83735 ASSAY OF MAGNESIUM: CPT

## 2019-03-31 PROCEDURE — 82607 VITAMIN B-12: CPT

## 2019-03-31 PROCEDURE — 700105 HCHG RX REV CODE 258: Performed by: FAMILY MEDICINE

## 2019-03-31 PROCEDURE — 82140 ASSAY OF AMMONIA: CPT

## 2019-03-31 RX ORDER — POTASSIUM CHLORIDE 20 MEQ/1
20 TABLET, EXTENDED RELEASE ORAL ONCE
Status: COMPLETED | OUTPATIENT
Start: 2019-03-31 | End: 2019-03-31

## 2019-03-31 RX ORDER — CYANOCOBALAMIN 1000 UG/ML
1000 INJECTION, SOLUTION INTRAMUSCULAR; SUBCUTANEOUS
Status: DISCONTINUED | OUTPATIENT
Start: 2019-03-31 | End: 2019-05-01

## 2019-03-31 RX ADMIN — HEPARIN SODIUM 5000 UNITS: 5000 INJECTION, SOLUTION INTRAVENOUS; SUBCUTANEOUS at 06:49

## 2019-03-31 RX ADMIN — POTASSIUM CHLORIDE 20 MEQ: 1500 TABLET, EXTENDED RELEASE ORAL at 20:30

## 2019-03-31 RX ADMIN — SENNOSIDES AND DOCUSATE SODIUM 1 TABLET: 8.6; 5 TABLET ORAL at 21:00

## 2019-03-31 RX ADMIN — DEXTROSE MONOHYDRATE: 50 INJECTION, SOLUTION INTRAVENOUS at 08:38

## 2019-03-31 RX ADMIN — RISPERIDONE 0.5 MG: 0.5 TABLET, FILM COATED ORAL at 21:57

## 2019-03-31 RX ADMIN — CYANOCOBALAMIN 1000 MCG: 1000 INJECTION, SOLUTION INTRAMUSCULAR; SUBCUTANEOUS at 21:52

## 2019-03-31 RX ADMIN — RISPERIDONE 0.5 MG: 0.5 TABLET, FILM COATED ORAL at 06:51

## 2019-03-31 RX ADMIN — METOPROLOL TARTRATE 12.5 MG: 25 TABLET, FILM COATED ORAL at 06:52

## 2019-03-31 RX ADMIN — HEPARIN SODIUM 5000 UNITS: 5000 INJECTION, SOLUTION INTRAVENOUS; SUBCUTANEOUS at 21:52

## 2019-03-31 NOTE — CARE PLAN
Problem: Safety  Goal: Will remain free from falls  Bed alarm is in use.  Frequent rounding on patient.

## 2019-03-31 NOTE — CARE PLAN
Problem: Infection  Goal: Will remain free from infection  Outcome: PROGRESSING AS EXPECTED  Pts VS are WNL, and no obvious S/S of infection. Labs also do not indicate infection.     Problem: Bowel/Gastric:  Goal: Normal bowel function is maintained or improved  Outcome: PROGRESSING AS EXPECTED  Per pt report, she is having normal bowel patterns.

## 2019-03-31 NOTE — CONSULTS
"Reason for PC Consult: Advance Care Planning    Consulted by: Dr. Lucia; currently Dr. Boyle    Assessment:  General: 89 y/o woman from home after being found down; pt with femur fx s/p hemiarthroplasty 3/26. PMHx of Parkinson's disease, dementia, osteoporosis, paroxysmal atrial fibrillation. Pt remained confused post-operatively and failed SLP; POAs opted against feeding tubes per the patient's expressed wishes.     Social: Pt was living alone with home health in 3-4 days/week after a recent admission to [facility]. Support from the [employer] who would check on her, but not anyone consistently    Consults: surgery    Dyspnea: No-    Last BM: 03/30/19-    Pain: No-    Depression: Unable to determine-    Dementia: Yes;       Spiritual:  Is Christian or spirituality important for coping with this illness? Yes- pt's POA is her   Has a  or spiritual provider visit been requested? No    Palliative Performance Scale: 30%    Advance Directive: Advance Directive-    DPOA: Yes- Father Oneil EDWARD: No-      Code Status: DNR    Outcome:  PC RN received a message from Jossue noting the patient \"is improving and I don't think we need your services at this time\" but inquired about what to do if she declines. PC RN received updates from MD prior to calling Jossue back. Call placed to Jossue and explained the role of PC, noting the difference between this RN and hospice, with a goal of understanding a patient's wishes, provide education and help with POC. PC RN acknowledged the plan for SNF which he is still in agreement with. At baseline, she was ambulatory and had therapy in the home, but no consistent caregivers. PC RN explained the role of SNF and encouraged he advocate for her there to ensure their goals/plan was understood. He states the goal is to eventually get her back into her home. PC briefly discussed the role of hospice if she declines or has a change her POC. He was appreciative of the information and " education. He denied any other questions at this time and was encouraged to call if anything arose.     While talking to Jossue, PC RN provided therapeutic communication, including open ended questions, reflective listening, and normalization of thought and feelings throughout encounter, as well as reinforced Rosalba's goals of care. PC contact information provided to Jossue and encouraged to call with any questions or concerns.     Updated: MD    Plan: to SNF when accepted/cleared    Recommendations: I do not recommend an ethics or hospice consult at this time because seeking treatment.    Thank you for allowing Palliative Care to participate in this patient's care. Please feel free to call [phone] with any questions or concerns.

## 2019-03-31 NOTE — PROGRESS NOTES
Patient is confused and restless at times. Pt currently has a friend at the bedside. Pt is incontinent X2 and is changed as needed. Pt is not in restraints and does not need restraints. MD d/c iv fluids and pt can have No iv in place per MD verbal order. Pt is awaiting SNF placement. Pt has not tried to get OOB thus far on my shift. Pt does have a scratch deonte on her Left leg dressing but it is still intact the dressing and there is some serious drainage on the Left leg dressing.

## 2019-03-31 NOTE — PROGRESS NOTES
· 2 RN skin check complete.   · Devices in place mepilex on bottom and foam dressings on Heels.  · Skin assessed under devices yes.  · Confirmed pressure ulcers found on N/A.  · New potential pressure ulcers noted on N/A. Wound consult placed? no. Photo uploaded? no. MIDAS completed? no  · The following interventions are in place pt is being turned q2hr, we are using barrier wipes and cream on the patient due to incontinence and changing her as needed. Patient has non slip socks on and we are repositioning with pillows. Pts heels are offloaded. Pt has call bell within reach but she is confused and requires frequent re orientating. Pts bottom is red but blanchable. Heels and ears are also red but everything is blanchable. Elbows under foam dressing are normal for pts skin tone. Pt does have bruising by her peroneum/inbetween her legs.

## 2019-03-31 NOTE — CARE PLAN
Problem: Skin Integrity  Goal: Risk for impaired skin integrity will decrease  Patient is on a waffle mattress.  Pillows are in use for support.  Patient is turned every two hours.  Barrier cream wipes are in use. Heels are floated.

## 2019-03-31 NOTE — PALLIATIVE CARE
Palliative Care follow-up  Consult received and EMR reviewed. Call placed to JOSE Marcum [phone] to f/u on a time to meet and discuss Rosalba's POC. No answer and message left requesting a call back to discuss.       Plan: f/u when call received from POA    Thank you for allowing Palliative Care to participate in this patient's care. Please feel free to call [phone] with any questions or concerns.
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
## 2019-04-01 PROBLEM — R41.0 DELIRIUM: Status: RESOLVED | Noted: 2019-03-28 | Resolved: 2019-04-01

## 2019-04-01 PROBLEM — E87.20 LACTIC ACIDOSIS: Status: RESOLVED | Noted: 2019-03-25 | Resolved: 2019-04-01

## 2019-04-01 PROBLEM — E87.0 HYPERNATREMIA: Status: RESOLVED | Noted: 2019-03-29 | Resolved: 2019-04-01

## 2019-04-01 PROBLEM — D72.829 LEUKOCYTOSIS: Status: RESOLVED | Noted: 2019-03-25 | Resolved: 2019-04-01

## 2019-04-01 PROCEDURE — 700102 HCHG RX REV CODE 250 W/ 637 OVERRIDE(OP): Performed by: HOSPITALIST

## 2019-04-01 PROCEDURE — 700102 HCHG RX REV CODE 250 W/ 637 OVERRIDE(OP): Performed by: INTERNAL MEDICINE

## 2019-04-01 PROCEDURE — 99232 SBSQ HOSP IP/OBS MODERATE 35: CPT | Performed by: INTERNAL MEDICINE

## 2019-04-01 PROCEDURE — 700112 HCHG RX REV CODE 229: Performed by: ORTHOPAEDIC SURGERY

## 2019-04-01 PROCEDURE — A9270 NON-COVERED ITEM OR SERVICE: HCPCS | Performed by: HOSPITALIST

## 2019-04-01 PROCEDURE — 97530 THERAPEUTIC ACTIVITIES: CPT

## 2019-04-01 PROCEDURE — 770006 HCHG ROOM/CARE - MED/SURG/GYN SEMI*

## 2019-04-01 PROCEDURE — A9270 NON-COVERED ITEM OR SERVICE: HCPCS | Performed by: ORTHOPAEDIC SURGERY

## 2019-04-01 PROCEDURE — 700111 HCHG RX REV CODE 636 W/ 250 OVERRIDE (IP): Performed by: FAMILY MEDICINE

## 2019-04-01 PROCEDURE — A9270 NON-COVERED ITEM OR SERVICE: HCPCS | Performed by: INTERNAL MEDICINE

## 2019-04-01 RX ORDER — RISPERIDONE 0.5 MG/1
0.5 TABLET ORAL 2 TIMES DAILY
Qty: 60 TAB | Refills: 3
Start: 2019-04-01 | End: 2019-05-10

## 2019-04-01 RX ORDER — CYANOCOBALAMIN 1000 UG/ML
1000 INJECTION, SOLUTION INTRAMUSCULAR; SUBCUTANEOUS
Refills: 0
Start: 2019-04-30 | End: 2019-05-10

## 2019-04-01 RX ORDER — OXYBUTYNIN CHLORIDE 5 MG/1
5 TABLET, EXTENDED RELEASE ORAL DAILY
Start: 2019-04-01 | End: 2019-05-10

## 2019-04-01 RX ORDER — HEPARIN SODIUM 5000 [USP'U]/ML
5000 INJECTION, SOLUTION INTRAVENOUS; SUBCUTANEOUS EVERY 8 HOURS
Refills: 0
Start: 2019-04-01 | End: 2019-05-10

## 2019-04-01 RX ADMIN — HEPARIN SODIUM 5000 UNITS: 5000 INJECTION, SOLUTION INTRAVENOUS; SUBCUTANEOUS at 06:29

## 2019-04-01 RX ADMIN — METOPROLOL TARTRATE 12.5 MG: 25 TABLET, FILM COATED ORAL at 06:38

## 2019-04-01 RX ADMIN — RISPERIDONE 0.5 MG: 0.5 TABLET, FILM COATED ORAL at 17:57

## 2019-04-01 RX ADMIN — METOPROLOL TARTRATE 12.5 MG: 25 TABLET, FILM COATED ORAL at 17:57

## 2019-04-01 RX ADMIN — HEPARIN SODIUM 5000 UNITS: 5000 INJECTION, SOLUTION INTRAVENOUS; SUBCUTANEOUS at 14:10

## 2019-04-01 RX ADMIN — DOCUSATE SODIUM 100 MG: 100 CAPSULE, LIQUID FILLED ORAL at 06:29

## 2019-04-01 RX ADMIN — HEPARIN SODIUM 5000 UNITS: 5000 INJECTION, SOLUTION INTRAVENOUS; SUBCUTANEOUS at 22:00

## 2019-04-01 RX ADMIN — RISPERIDONE 0.5 MG: 0.5 TABLET, FILM COATED ORAL at 06:30

## 2019-04-01 ASSESSMENT — COGNITIVE AND FUNCTIONAL STATUS - GENERAL
SUGGESTED CMS G CODE MODIFIER DAILY ACTIVITY: CL
STANDING UP FROM CHAIR USING ARMS: A LOT
TURNING FROM BACK TO SIDE WHILE IN FLAT BAD: UNABLE
MOVING FROM LYING ON BACK TO SITTING ON SIDE OF FLAT BED: UNABLE
DAILY ACTIVITIY SCORE: 13
DRESSING REGULAR UPPER BODY CLOTHING: A LOT
WALKING IN HOSPITAL ROOM: A LOT
SUGGESTED CMS G CODE MODIFIER MOBILITY: CM
TOILETING: A LOT
MOVING TO AND FROM BED TO CHAIR: UNABLE
HELP NEEDED FOR BATHING: A LOT
PERSONAL GROOMING: A LITTLE
DRESSING REGULAR LOWER BODY CLOTHING: A LOT
CLIMB 3 TO 5 STEPS WITH RAILING: TOTAL
EATING MEALS: A LOT
MOBILITY SCORE: 8

## 2019-04-01 ASSESSMENT — GAIT ASSESSMENTS: GAIT LEVEL OF ASSIST: UNABLE TO PARTICIPATE

## 2019-04-01 ASSESSMENT — ENCOUNTER SYMPTOMS: SHORTNESS OF BREATH: 0

## 2019-04-01 NOTE — PROGRESS NOTES
"   Orthopaedic PA Progress Note    Interval changes: No complaints.     ROS - Patient denies any new issues. No chest pain, dyspnea, or fever.  Pain well controlled.    Blood pressure (!) 89/49, pulse 97, temperature 37.1 °C (98.8 °F), temperature source Temporal, resp. rate 16, height 1.575 m (5' 2\"), weight 43.5 kg (96 lb), SpO2 88 %, not currently breastfeeding.    Patient seen and examined  No acute distress  Breathing non labored  RRR    Recent Labs      03/31/19   0346   WBC  5.9   RBC  3.08*   HEMOGLOBIN  9.3*   HEMATOCRIT  29.2*   MCV  94.8   MCH  30.2   MCHC  31.8*   RDW  45.2   PLATELETCT  179   MPV  9.9     Active Hospital Problems    Diagnosis   • Closed fracture of neck of left femur (HCC) [S72.002A]     Priority: High   • Late onset Alzheimer's disease without behavioral disturbance [G30.1, F02.80]     Priority: Low   • Hypercholesterolemia [E78.00]     Priority: Low   • Dysphagia [R13.10]   • Moderate protein-calorie malnutrition (HCC) [E44.0]   • Hypernatremia [E87.0]   • Delirium [R41.0]   • Normocytic anemia [D64.9]   • Fall [W19.XXXA]   • Leukocytosis [D72.829]   • Hypokalemia [E87.6]   • Hyperglycemia [R73.9]   • Lactic acidosis [E87.2]   • Paroxysmal atrial fibrillation (HCC) [I48.0]     Assessment/Plan:  POD#5 S/P L hip ave  Wt bearing status - AT  PT/OT-initiated  Wound care:dressing left in place  Drains - no  Davis-no  Sutures/Staples out- 10-14 days post operatively  Antibiotics: complete  DVT Prophylaxis- TEDS/SCDs/Foot pumps.   UFH 5000u q8h Duration-until ambulatory > 150'  Future Procedures - not anticipated  Case Coordination for Discharge Planning - Disposition SNF, cleared from Ortho standpoint      "

## 2019-04-01 NOTE — PROGRESS NOTES
Hospital Medicine Daily Progress Note    Date of Service  3/31/2019    Chief Complaint  89 y.o. female admitted 3/25/2019 with falls/syncope and hip fracture    Hospital Course    89 y.o. female who presented 3/25/2019 with past medical history of Parkinson's disease, dementia, osteoporosis, paroxysmal atrial fibrillation, admitted with fall/syncope apparently patient was found on the floor by caretaker. EMS found patient in Afib/RVR. Due to concern for possible sepsis patient had pan CT that showed left-sided neck femoral fracture but no other acute findings, patient received broad-spectrum antibiotics. Admitted to telemetry, ortho consulted for hip fracture.      Interval Problem Update  3/26--Going for hip repair this AM, will need PT/OT/SLP afterwards  3/27: Laying in bed.  Lethargic.  Open her eyes to verbal stimulus but then drift back to sleep quickly.  No acute distress noted.  3/28: More awake today but confused and agitated.  Requiring physical restraints.  Disoriented to time and place.  Inattentive.  3/29: Confused.  Still requiring restraints.  Pulled her Davis catheter but no hematuria noted.  Bladder scan with no retention noted.  Failed swallow evaluation.  POA refusing any tube feeding as per patient wishes.  No acute distress noted.  3/30:   The patient appears calm during my examination but she has poor memory and pulled on lines and impulsive per nursing staff.  She is still on restraints.  Will start risperidone and monitor.  Will consider removal of restraints tomorrow.   Discussed with the patient's POA who is sitting at bedside.   3/31:  The patient appears more calm and cooperative this morning.  The patient's friends are at bedside.  I advised the patient's primary nurse to keep the patient off restraints and monitor the patient's behaviors.    Consultants/Specialty  Ortho  Palliative  Code Status  DNR/DNI    Disposition  Pending SNF acceptance.  Needs to remove physical restraints prior to  discharge.  The patient will likely need long-term placement because the patient's prior living situation is by herself.       Review of Systems  Review of Systems   Unable to perform ROS: Dementia      Physical Exam  Temp:  [36.4 °C (97.6 °F)-37.1 °C (98.8 °F)] 37.1 °C (98.8 °F)  Pulse:  [66-97] 97  Resp:  [16-18] 16  BP: ()/(44-60) 89/49  SpO2:  [88 %-98 %] 88 %    Physical Exam   Constitutional: She appears cachectic. No distress.   Frail.  Confused.    HENT:   Head: Normocephalic and atraumatic.   Mouth/Throat: Oropharynx is clear and moist.   Eyes: Pupils are equal, round, and reactive to light. Conjunctivae are normal. Right eye exhibits no discharge. Left eye exhibits no discharge. No scleral icterus.   Neck: Normal range of motion. No thyromegaly present.   Cardiovascular: Normal rate, regular rhythm and normal heart sounds.  Exam reveals no gallop and no friction rub.    Pulmonary/Chest: Effort normal and breath sounds normal. No respiratory distress.   Abdominal: Soft. She exhibits no distension. There is no tenderness.   Musculoskeletal: She exhibits no tenderness or deformity.   Diffuse muscle wasting   Multiple old ecchymosis and bruises on upper extremities.    Neurological: She is alert.   Oriented to person. Not to time and place.   Skin: Skin is warm and dry. She is not diaphoretic.   Psychiatric: Her affect is labile. Cognition and memory are impaired. She expresses impulsivity. She exhibits abnormal recent memory and abnormal remote memory.   Nursing note and vitals reviewed.    Fluids    Intake/Output Summary (Last 24 hours) at 03/31/19 1958  Last data filed at 03/30/19 2130   Gross per 24 hour   Intake              120 ml   Output                0 ml   Net              120 ml     Laboratory  Recent Labs      03/31/19   0346   WBC  5.9   RBC  3.08*   HEMOGLOBIN  9.3*   HEMATOCRIT  29.2*   MCV  94.8   MCH  30.2   MCHC  31.8*   RDW  45.2   PLATELETCT  179   MPV  9.9     Recent Labs       03/29/19   0504  03/31/19   0346   SODIUM  149*  139   POTASSIUM  3.9  3.4*   CHLORIDE  111  103   CO2  29  31   GLUCOSE  101*  152*   BUN  29*  27*   CREATININE  0.56  0.50   CALCIUM  8.8  8.3*                   Imaging  EC-ECHOCARDIOGRAM COMPLETE W/O CONT   Final Result      DX-PELVIS-1 OR 2 VIEWS   Final Result      Left hip arthroplasty.      CT-CHEST,ABDOMEN,PELVIS WITH   Final Result      1.  Acute left femoral neck fracture is identified.      2.  Fluid-containing structures in the pelvis bilaterally are identified. These could represent urinary bladder diverticula versus ovarian cysts or adnexal cysts.      3.  No solid organ injury identified.      4.  Small renal cysts are noted.      5.  Gallbladder appears distended and may contain a gallstone.      6.  Emphysema and areas of fibrosis are noted in the chest.      7.  Extensive atherosclerosis is noted throughout the aorta and its branch vessels.         CT-HEAD W/O   Final Result         NO ACUTE ABNORMALITIES ARE NOTED ON CT SCAN OF THE HEAD.      Findings are consistent with atrophy.  Decreased attenuation in the periventricular white matter likely indicates microvascular ischemic disease.      DX-FEMUR-2+ LEFT   Final Result      Left femoral neck fracture with displacement      DX-PELVIS-1 OR 2 VIEWS   Final Result      1.  Acute left femoral neck fracture with displacement      2.  Right superior and inferior pubic rami fracture, acuity indeterminate      DX-CHEST-PORTABLE (1 VIEW)   Final Result      1.  Interstitial densities which could be fibrosis or edema      2.  Air lucency projecting over the left lung base which could be a contusion or much less likely etiology such as diaphragmatic injury. CT recommended for further assessment           Assessment/Plan  * Fall- (present on admission)   Assessment & Plan    Unclear etiology although patient does state that it was a mechanical fall, continue telemetry, patient probably was dehydrated her  lactic acid was elevated on admission this has improved after fluid resuscitation.   Fall precautions.  PT/OT.     Closed fracture of neck of left femur (HCC)- (present on admission)   Assessment & Plan    After a fall, had CT hip showing Acute left femoral neck fracture is identified, Ortho has been consulted  Patient underwent left hemiarthroplasty on 3/26/2019.  PT/OT.  SNF placement after the patient is off restraint for 24 hours.       Hypernatremia- (present on admission)   Assessment & Plan    Started on D5W.  Continue to monitor.     Moderate protein-calorie malnutrition (HCC)- (present on admission)   Assessment & Plan    In the light of dysphagia and poor p.o. intake.  No tube feeding per POA.  Palliative consulted.     Dysphagia- (present on admission)   Assessment & Plan    Initially failed swallow evaluation.  Per POA, patient does not want any tube feeding.  Speech following.  Now, the patient is started on pureed diet.  Palliative consulted.     Normocytic anemia- (present on admission)   Assessment & Plan    Monitor H&H. Adequate folate level. B12 level is low normal range.  Give supplement via IM x 1 dose. Follow with oral supplements.     Delirium- (present on admission)   Assessment & Plan    Patient was agitated and confused.  She required physical restraints.  This likely combination of stress-induced on body from overall medical condition with potential adverse events of medications in a patient who has advanced dementia.  Avoid any anticholinergic medications or benzodiazepines.  Avoid any hypnotics or sedatives.  The patient is on physical restraints.  Minimize lines.  Avoid/discontinue Davis catheter as possible.  Improving mental status today.  More cooperative.     Lactic acidosis- (present on admission)   Assessment & Plan    Most likely due to dehydration, patient had pan CT that did not show signs of infection, lactic acid is improving after IV hydration, continue  monitoring.  Resolved.     Hyperglycemia- (present on admission)   Assessment & Plan    Hgb A1c is 5.9     Hypokalemia- (present on admission)   Assessment & Plan    Replacing     Leukocytosis- (present on admission)   Assessment & Plan    Probably reactive, no signs of infection.  Trending down.     Paroxysmal atrial fibrillation (HCC)- (present on admission)   Assessment & Plan    Patient was found to be on A. fib with RVR, echocardiogram indicate LV EF of 75% and mild MR and TR.  Continue on low-dose metoprolol, patient most probably is not good candidate for anticoagulation due to risk of fall and age     Late onset Alzheimer's disease without behavioral disturbance- (present on admission)   Assessment & Plan    High risk for hospital-acquired delirium.  Avoid benzodiazepines and/or anti-cholinergic medications.  Avoid any sedatives or hypnotics.  Minimize lines.  Willfully catheter.  Daily orientation  Started on low dose Risperdal. Removed physical restraints this morning.     Hypercholesterolemia- (present on admission)   Assessment & Plan    Total cholesterol is 150 and LDL is 74.         VTE prophylaxis: Heparin

## 2019-04-01 NOTE — CARE PLAN
Problem: Nutritional:  Goal: Achieve adequate nutritional intake  Advance diet as feasible and patient will consume ~50% of meals   Outcome: PROGRESSING SLOWER THAN EXPECTED

## 2019-04-01 NOTE — DIETARY
Nutrition Services: Update   Day 7 of admit.  Rosalba Wagner is a 89 y.o. female with admitting DX of A-fib, Femur fracture     Pt is currently on Regular DYS 1 (pureed) with nectar thick liquids diet. Per ADLs, pt intake variable: po intake for trays are <25%, for boost supplements pt is %.   Wt on 3/25 upon admit 43.54 kg via Stated weight - no weight hx beyond admission.     RD able to visit patient at bedside. Pt seemed confused and had some difficulty answering RD's questions. Pt states appetite is okay. States has mostly been consuming liquids and is eating what she wants. Pt describes getting hungry before meal time.     Evaluation:   1. Pt appetite is good per pt, though po intake still <25% for trays per ADLs. RD to follow to assess consistency      for intake for boost drinks.   2. Per previous RD note, pt weighed 44.7 kg via stand up scale on 10/20 admit.  This is a 2.7% wt loss over the      past 5-6 months, however current wt is a stated wt - question accuracy- no wt recorded since. RD to request current weight.   3.  Labs: K 3.4, Glucose 152, BUN 27   4.  Meds: vit B12, colace    Malnutrition Risk: Does not meet criteria for malnutrition at this time.     Recommendations/Plan:  1. Encourage intake of meals and supplements   2. Document intake of all meals and supplements as % taken in ADL's to provide interdisciplinary communication across all shifts.   3. Monitor weight.   4. Nutrition rep will continue to see patient for ongoing meal and snack preferences.    RD following

## 2019-04-01 NOTE — PROGRESS NOTES
Hospital Medicine Daily Progress Note    Date of Service  4/1/2019    Chief Complaint  89 y.o. female admitted 3/25/2019 with falls/syncope and hip fracture    Hospital Course    89 y.o. female who presented 3/25/2019 with past medical history of Parkinson's disease, dementia, osteoporosis, paroxysmal atrial fibrillation, admitted with fall/syncope apparently patient was found on the floor by caretaker. EMS found patient in Afib/RVR. Due to concern for possible sepsis patient had pan CT that showed left-sided neck femoral fracture but no other acute findings, patient received broad-spectrum antibiotics. Admitted to telemetry, ortho consulted for hip fracture.      Interval Problem Update  3/26--Going for hip repair this AM, will need PT/OT/SLP afterwards  3/27: Laying in bed.  Lethargic.  Open her eyes to verbal stimulus but then drift back to sleep quickly.  No acute distress noted.  3/28: More awake today but confused and agitated.  Requiring physical restraints.  Disoriented to time and place.  Inattentive.  3/29: Confused.  Still requiring restraints.  Pulled her Davis catheter but no hematuria noted.  Bladder scan with no retention noted.  Failed swallow evaluation.  POA refusing any tube feeding as per patient wishes.  No acute distress noted.  3/30:   The patient appears calm during my examination but she has poor memory and pulled on lines and impulsive per nursing staff.  She is still on restraints.  Will start risperidone and monitor.  Will consider removal of restraints tomorrow.   Discussed with the patient's POA who is sitting at bedside.   3/31:  The patient appears more calm and cooperative this morning.  The patient's friends are at bedside.  I advised the patient's primary nurse to keep the patient off restraints and monitor the patient's behaviors.  4/1:  The patient has been off restraints for more than 24 hours.  She remains calm in bed.       Consultants/Specialty  Ortho  Palliative  Code  Status  DNR/DNI    Disposition  Pending SNF acceptance.  Needs to remove physical restraints prior to discharge.  The patient will likely need long-term placement because the patient's prior living situation is by herself.       Review of Systems  Review of Systems   Unable to perform ROS: Other   Respiratory: Negative for shortness of breath.    Cardiovascular: Negative for chest pain.    review of systems limited due to the patient's dementia.     Physical Exam  Temp:  [36.6 °C (97.9 °F)-37.1 °C (98.8 °F)] 36.6 °C (97.9 °F)  Pulse:  [90-97] 90  Resp:  [16-20] 17  BP: ()/(49-76) 125/61  SpO2:  [88 %-92 %] 90 %    Physical Exam   Constitutional: She appears cachectic. No distress.   Frail.  Confused.    HENT:   Head: Normocephalic and atraumatic.   Mouth/Throat: Oropharynx is clear and moist.   Eyes: Pupils are equal, round, and reactive to light. Conjunctivae are normal. Right eye exhibits no discharge. Left eye exhibits no discharge. No scleral icterus.   Neck: Normal range of motion. No thyromegaly present.   Cardiovascular: Normal rate, regular rhythm and normal heart sounds.  Exam reveals no gallop and no friction rub.    Pulmonary/Chest: Effort normal and breath sounds normal. No respiratory distress.   Abdominal: Soft. She exhibits no distension. There is no tenderness.   Musculoskeletal: She exhibits no tenderness or deformity.   Diffuse muscle wasting   Multiple old ecchymosis and bruises on upper extremities.    Neurological: She is alert.   Oriented to person. Not to time and place.   Skin: Skin is warm and dry. She is not diaphoretic.   Psychiatric: Cognition and memory are impaired. She expresses impulsivity. She exhibits abnormal recent memory and abnormal remote memory.   Nursing note and vitals reviewed.    Fluids    Intake/Output Summary (Last 24 hours) at 04/01/19 1603  Last data filed at 04/01/19 0400   Gross per 24 hour   Intake                0 ml   Output              100 ml   Net              -100 ml     Laboratory  Recent Labs      03/31/19   0346   WBC  5.9   RBC  3.08*   HEMOGLOBIN  9.3*   HEMATOCRIT  29.2*   MCV  94.8   MCH  30.2   MCHC  31.8*   RDW  45.2   PLATELETCT  179   MPV  9.9     Recent Labs      03/31/19   0346   SODIUM  139   POTASSIUM  3.4*   CHLORIDE  103   CO2  31   GLUCOSE  152*   BUN  27*   CREATININE  0.50   CALCIUM  8.3*                   Imaging  EC-ECHOCARDIOGRAM COMPLETE W/O CONT   Final Result      DX-PELVIS-1 OR 2 VIEWS   Final Result      Left hip arthroplasty.      CT-CHEST,ABDOMEN,PELVIS WITH   Final Result      1.  Acute left femoral neck fracture is identified.      2.  Fluid-containing structures in the pelvis bilaterally are identified. These could represent urinary bladder diverticula versus ovarian cysts or adnexal cysts.      3.  No solid organ injury identified.      4.  Small renal cysts are noted.      5.  Gallbladder appears distended and may contain a gallstone.      6.  Emphysema and areas of fibrosis are noted in the chest.      7.  Extensive atherosclerosis is noted throughout the aorta and its branch vessels.         CT-HEAD W/O   Final Result         NO ACUTE ABNORMALITIES ARE NOTED ON CT SCAN OF THE HEAD.      Findings are consistent with atrophy.  Decreased attenuation in the periventricular white matter likely indicates microvascular ischemic disease.      DX-FEMUR-2+ LEFT   Final Result      Left femoral neck fracture with displacement      DX-PELVIS-1 OR 2 VIEWS   Final Result      1.  Acute left femoral neck fracture with displacement      2.  Right superior and inferior pubic rami fracture, acuity indeterminate      DX-CHEST-PORTABLE (1 VIEW)   Final Result      1.  Interstitial densities which could be fibrosis or edema      2.  Air lucency projecting over the left lung base which could be a contusion or much less likely etiology such as diaphragmatic injury. CT recommended for further assessment           Assessment/Plan  * Fall- (present on  admission)   Assessment & Plan    Unclear etiology although patient does state that it was a mechanical fall, continue telemetry, patient probably was dehydrated her lactic acid was elevated on admission this has improved after fluid resuscitation.   Fall precautions.  PT/OT.     Closed fracture of neck of left femur (HCC)- (present on admission)   Assessment & Plan    After a fall, had CT hip showing Acute left femoral neck fracture is identified, Ortho has been consulted  Patient underwent left hemiarthroplasty on 3/26/2019.  PT/OT.  the patient is off restraint for 24 hours.  Discussed discharge planning with social work.   The patient is safe for transition to a  post-acute facility.       Hypernatremia- (present on admission)   Assessment & Plan    Infused D5W, sodium level normalized.  Continue to monitor.     Moderate protein-calorie malnutrition (HCC)- (present on admission)   Assessment & Plan    poor p.o. intake.  No tube feeding per POA.  Palliative consulted.  The patient is able to tolerate oral diet.     Dysphagia- (present on admission)   Assessment & Plan    Initially failed swallow evaluation.  Per POA, patient does not want any tube feeding.  Speech following.  Now, the patient is on pureed diet.  Palliative consulted.     Normocytic anemia- (present on admission)   Assessment & Plan    Monitor H&H. Adequate folate level. B12 level is low normal range.  Give supplement via IM x 1 dose. Continue with oral supplements.     Delirium- (present on admission)   Assessment & Plan    Patient was agitated and confused.  She required physical restraints.  This likely combination of stress-induced on body from overall medical condition with potential adverse events of medications in a patient who has advanced dementia.  Avoid any anticholinergic medications or benzodiazepines.  Avoid any hypnotics or sedatives.  The patient is on physical restraints.  Minimize lines.  Avoid/discontinue Davis catheter as  possible.   More cooperative. Mental status near baseline.     Lactic acidosis- (present on admission)   Assessment & Plan    Most likely due to dehydration, patient had pan CT that did not show signs of infection, lactic acid is improving after IV hydration, continue monitoring.  Resolved.     Hyperglycemia- (present on admission)   Assessment & Plan    Hgb A1c is 5.9     Hypokalemia- (present on admission)   Assessment & Plan    Replacing     Leukocytosis- (present on admission)   Assessment & Plan    Probably reactive, no signs of infection.  Wbc in normal range now.     Paroxysmal atrial fibrillation (HCC)- (present on admission)   Assessment & Plan    Patient was found to be on A. fib with RVR, echocardiogram indicate LV EF of 75% and mild MR and TR.  Continue on low-dose metoprolol, patient most probably is not good candidate for anticoagulation due to risk of fall and age     Late onset Alzheimer's disease without behavioral disturbance- (present on admission)   Assessment & Plan    High risk for hospital-acquired delirium.  Avoid benzodiazepines and/or anti-cholinergic medications.  Avoid any sedatives or hypnotics.  Minimize lines.  Willfully catheter.  Daily orientation  Keep off restraints.  Continue low dose Risperdal.      Hypercholesterolemia- (present on admission)   Assessment & Plan    Total cholesterol is 150 and LDL is 74.         VTE prophylaxis: Heparin

## 2019-04-01 NOTE — DISCHARGE PLANNING
Agency/Facility Name: [facility]  Spoke To: Katya  Outcome: Will need to review and call this CCA back     Agency[facility]Facility Name: [facility]  Left a message with admissions

## 2019-04-01 NOTE — PROGRESS NOTES
· 2 RN skin check complete w TERRY Obando  · Devices in place mepilex on bottom  · Skin assessed under devices yes.  · Confirmed pressure ulcers found on N/A.  · The following interventions are in place q2hr turns, barrier wipes and cream, repositioning with pillows.sacrum normal color, L elbow red but blanching. bruising to her peroneum/inbetween her legs.

## 2019-04-01 NOTE — DISCHARGE PLANNING
Agency/Facility Name: [facility]  Spoke To: Radha  Outcome: Declined, not contracted with insurance

## 2019-04-01 NOTE — CARE PLAN
Problem: Safety  Goal: Will remain free from falls  Outcome: MET Date Met: 04/01/19  Bed locked in lowest position with top two siderails up. Call light within reach. Treaded socks on. Educated patient to call for help before getting up    Problem: Urinary Elimination:  Goal: Ability to reestablish a normal urinary elimination pattern will improve  Outcome: MET Date Met: 04/01/19

## 2019-04-01 NOTE — DISCHARGE PLANNING
"Problem: Patient Care Overview (Adult)  Goal: Plan of Care Review  Outcome: Ongoing (interventions implemented as appropriate)    10/20/17 0818   Coping/Psychosocial Response Interventions   Plan Of Care Reviewed With patient   Patient Care Overview   Progress progress toward functional goals as expected       Goal: Adult Individualization and Mutuality  Outcome: Ongoing (interventions implemented as appropriate)    10/20/17 0818   Individualization   Patient Specific Preferences PT PREFERS TO BE CALLED \"[facility]\"    Patient Specific Goals TO BE RELAXED FOR SX .TODAY   Patient Specific Interventions TO GIVE RELAXING MED PER [facility] ORDER   Mutuality/Individual Preferences   What Anxieties, Fears or Concerns Do You Have About Your Health or Care? NONE AT THIS TIME.    What Questions Do You Have About Your Health or Care? NONE AT THIS TIME.    What Information Would Help Us Give You More Personalized Care? SEE ABOVE.       Goal: Discharge Needs Assessment  Outcome: Ongoing (interventions implemented as appropriate)    Problem: Perioperative Period (Adult)  Goal: Signs and Symptoms of Listed Potential Problems Will be Absent or Manageable (Perioperative Period)  Outcome: Ongoing (interventions implemented as appropriate)    10/20/17 0818   Perioperative Period   Problems Assessed (Perioperative Period) all   Problems Present (Perioperative Period) pain           " Agency/Facility Name: [facility]  Fax Notification   Outcome: Declined, no safe DC plan

## 2019-04-01 NOTE — THERAPY
"Physical Therapy Treatment completed.   Bed Mobility:  Supine to Sit: Maximal Assist  Transfers: Sit to Stand: Minimal Assist  Gait: Level Of Assist: Unable to Participate with Front-Wheel Walker       Plan of Care: Will benefit from Physical Therapy 4 times per week  Discharge Recommendations: Equipment: Will Continue to Assess for Equipment Needs. Post-acute therapy Recommend inpatient transitional care services for continued physical therapy services.      See \"Rehab Therapy-Acute\" Patient Summary Report for complete documentation.     Pt continues to present w/ decreased functional mobility. Pt confused and does not understand that she had surgery nor understand why she has pain causing difficulty w/ functional mobility. Pt attempted to assist w/ bed mobility but stopped as soon as she began to move her L LE. Pt would not WB through L LE w/ standing. She did instinctively pivot R LE to assist w/ transfer to the chair. As per initial eval, pt will benefit from post acute therapy.  "

## 2019-04-01 NOTE — THERAPY
"Occupational Therapy Treatment completed with focus on ADL transfers.  Functional Status: Seated in chair eating breakfast on arrival. Sit>stand with min A. Declined all other ADLs, c/o fatigue. Txf from chair to EOB with Mod A for following hip precautions and sequencing. Sit>supine with mod A for BLE. Left supine.  Plan of Care: Will benefit from Occupational Therapy 3 times per week  Discharge Recommendations:  Equipment Will Continue to Assess for Equipment Needs. Post-acute therapy: Recommend inpatient transitional care services for continued occupational therapy services.       See \"Rehab Therapy-Acute\" Patient Summary Report for complete documentation.     Pt seen for OT session. Progressing with strength, but continues to be limited by decreased functional mobility, activity tolerance, cognition, strength, and balance which are currently affecting pt's ability to complete ADLs/IADLs at baseline. Currently recommend acute OT and inpatient transitional care services for continued occupational therapy services.         "

## 2019-04-01 NOTE — PROGRESS NOTES
"   Orthopaedic PA Progress Note    Interval changes:OOB with OT this AM. Did well overnight    ROS - Patient denies any new issues. No chest pain, dyspnea, or fever.  Pain well controlled.    Blood pressure (!) 174/76, pulse 96, temperature 36.7 °C (98 °F), temperature source Temporal, resp. rate 16, height 1.575 m (5' 2\"), weight 43.5 kg (96 lb), SpO2 90 %, not currently breastfeeding.    Patient seen and examined  No acute distress  Breathing non labored  RRR  Surgical dressing is clean, dry, and intact. Patient clearly fires tibialis anterior, EHL, and gastrocnemius/soleus. Sensation is intact to light touch throughout superficial peroneal, deep peroneal, tibial, saphenous, and sural nerve distributions. Strong and palpable 2+ dorsalis pedis and posterior tibial pulses with capillary refill less than 2 seconds. No lower leg tenderness or discomfort.    Recent Labs      03/31/19   0346   WBC  5.9   RBC  3.08*   HEMOGLOBIN  9.3*   HEMATOCRIT  29.2*   MCV  94.8   MCH  30.2   MCHC  31.8*   RDW  45.2   PLATELETCT  179   MPV  9.9       Active Hospital Problems    Diagnosis   • Closed fracture of neck of left femur (HCC) [S72.002A]     Priority: High   • Late onset Alzheimer's disease without behavioral disturbance [G30.1, F02.80]     Priority: Low   • Hypercholesterolemia [E78.00]     Priority: Low   • Dysphagia [R13.10]   • Moderate protein-calorie malnutrition (HCC) [E44.0]   • Hypernatremia [E87.0]   • Delirium [R41.0]   • Normocytic anemia [D64.9]   • Fall [W19.XXXA]   • Leukocytosis [D72.829]   • Hypokalemia [E87.6]   • Hyperglycemia [R73.9]   • Lactic acidosis [E87.2]   • Paroxysmal atrial fibrillation (HCC) [I48.0]     Assessment/Plan:  POD#6 S/P L hip ave  Wt bearing status - AT  PT/OT-initiated  Wound care:dressing left in place  Drains - no  Davis-no  Sutures/Staples out- 10-14 days post operatively  Antibiotics: complete  DVT Prophylaxis- TEDS/SCDs/Foot pumps.   UFH 5000u q8h Duration-until ambulatory " > 150'  Future Procedures - not anticipated  Case Coordination for Discharge Planning - Disposition SNF, cleared from Ortho standpoint, may transition to ASA 81 mg PO BID on discharge / transfer in lieu of UFH

## 2019-04-01 NOTE — DISCHARGE SUMMARY
Discharge Summary    CHIEF COMPLAINT ON ADMISSION  Chief Complaint   Patient presents with   • Syncope   • Blood in Vomit   • Atrial Fibrillation       Reason for Admission  Fall/syncope     Admission Date  3/25/2019    CODE STATUS  DNAR/DNI    HPI & HOSPITAL COURSE  This is a 89 y.o. female with past medical history of Parkinson's disease, dementia, osteoporosis, paroxysmal atrial fibrillation, presented with fall/syncope apparently patient was found on the floor by caretaker. EMS found patient in Afib/RVR. Due to concern for possible sepsis patient had pan CT that showed left-sided neck femoral fracture but no other acute findings, patient received broad-spectrum antibiotics. Admitted to telemetry, ortho consulted for hip fracture. Subsequently, the patient underwent left hip hemiarthroplasty on 3/26/2019.  After the surgery, the patient remained in hospital because she had intermittent confusion and agitation which required physical restraints.  Eventually, we started patient on risperidone which help calm the patient.   Then the physical restraints was discontinued.   Per orthopedic surgeon's recommendation, suutures/staples may be removed 10-14 days post operatively and continue TEDS/SCDs/Foot pumps and UFH 5000u q8h Duration-until ambulatory greater 150 feet.      Therefore, she is discharged in fair and stable condition to skilled nursing facility.    The patient met 2-midnight criteria for an inpatient stay at the time of discharge.    Discharge Date  4/1/2019    FOLLOW UP ITEMS POST DISCHARGE  None.    DISCHARGE DIAGNOSES  Principal Problem (Resolved):    Fall POA: Yes  Active Problems:    Closed fracture of neck of left femur (HCC) POA: Yes    Hypercholesterolemia POA: Yes    Late onset Alzheimer's disease without behavioral disturbance POA: Yes    Paroxysmal atrial fibrillation (HCC) POA: Yes    Hypokalemia POA: Yes    Hyperglycemia POA: Yes    Normocytic anemia POA: Yes    Dysphagia POA: Yes    Moderate  protein-calorie malnutrition (HCC) POA: Yes  Resolved Problems:    Leukocytosis POA: Yes    Lactic acidosis POA: Yes    Delirium POA: Yes    Hypernatremia POA: Yes      FOLLOW UP   Follow up with Dr. Arsenio Lewis in one week.    MEDICATIONS ON DISCHARGE     Medication List      START taking these medications      Instructions   cyanocobalamin 1000 MCG/ML Soln  Start taking on:  4/30/2019  Commonly known as:  VITAMIN B-12   1 mL by Intramuscular route every 30 days.  Dose:  1000 mcg     heparin 5000 UNIT/ML Soln   Inject 1 mL as instructed every 8 hours.  Dose:  5000 Units     metoprolol 25 MG Tabs  Commonly known as:  LOPRESSOR   Take 0.5 Tabs by mouth 2 Times a Day.  Dose:  12.5 mg     risperiDONE 0.5 MG Tabs  Commonly known as:  RISPERDAL   Take 1 Tab by mouth 2 Times a Day.  Dose:  0.5 mg        CONTINUE taking these medications      Instructions   acetaminophen 500 MG Tabs  Commonly known as:  TYLENOL   Take 500-1,000 mg by mouth every 6 hours as needed.  Dose:  500-1000 mg     fludrocortisone 0.1 MG Tabs  Commonly known as:  FLORINEF   Take 0.1 mg by mouth every day.  Dose:  0.1 mg     oxybutynin SR 5 MG Tb24  Commonly known as:  DITROPAN-XL   Take 1 Tab by mouth every day.  Dose:  5 mg            Allergies  No Known Allergies    DIET  Orders Placed This Encounter   Procedures   • Diet Order Regular     Standing Status:   Standing     Number of Occurrences:   1     Order Specific Question:   Diet:     Answer:   Regular [1]     Order Specific Question:   Texture/Fiber modifications:     Answer:   Dysphagia 1(Pureed)specify fluid consistency(question 6) [1]     Order Specific Question:   Consistency/Fluid modifications:     Answer:   Nectar Thick [2]     Order Specific Question:   Miscellaneous modifications:     Answer:   SLP - Deliver to Nursing Station [22]     Order Specific Question:   Miscellaneous modifications:     Answer:   SLP - 1:1 Supervision by Nursing [21]       ACTIVITY  As tolerated.  Weight  bearing as tolerated    CONSULTATIONS  Dr. Arsenio Lewis M.D.    PROCEDURES  Left hip hemiarthoroplasty    LABORATORY  Lab Results   Component Value Date    SODIUM 139 03/31/2019    POTASSIUM 3.4 (L) 03/31/2019    CHLORIDE 103 03/31/2019    CO2 31 03/31/2019    GLUCOSE 152 (H) 03/31/2019    BUN 27 (H) 03/31/2019    CREATININE 0.50 03/31/2019        Lab Results   Component Value Date    WBC 5.9 03/31/2019    HEMOGLOBIN 9.3 (L) 03/31/2019    HEMATOCRIT 29.2 (L) 03/31/2019    PLATELETCT 179 03/31/2019      Physical Exam   Constitutional: She appears cachectic. No distress.   Frail and Confused.    HENT:   Head: Normocephalic and atraumatic.   Mouth/Throat: Oropharynx is clear and moist.   Eyes: Pupils are equal, round, and reactive to light. Conjunctivae are normal. Right eye exhibits no discharge. Left eye exhibits no discharge. No scleral icterus.   Neck: Normal range of motion. No thyromegaly present.   Cardiovascular: Normal rate, regular rhythm and normal heart sounds.  Exam reveals no gallop and no friction rub.    Pulmonary/Chest: Effort normal and breath sounds normal. No respiratory distress.   Abdominal: Soft. She exhibits no distension. There is no tenderness.   Musculoskeletal: She exhibits no tenderness or deformity.   Diffuse muscle wasting   Multiple old ecchymosis and bruises on upper extremities.    Neurological: She is alert.   Oriented to person. Not to time and place.   Skin: Skin is warm and dry. She is not diaphoretic.   Psychiatric: Cognition and memory are impaired. She exhibits abnormal recent memory and abnormal remote memory.     Total time of the discharge process exceeds 32 minutes.

## 2019-04-02 PROCEDURE — A9270 NON-COVERED ITEM OR SERVICE: HCPCS | Performed by: HOSPITALIST

## 2019-04-02 PROCEDURE — 700111 HCHG RX REV CODE 636 W/ 250 OVERRIDE (IP): Performed by: FAMILY MEDICINE

## 2019-04-02 PROCEDURE — 700102 HCHG RX REV CODE 250 W/ 637 OVERRIDE(OP): Performed by: HOSPITALIST

## 2019-04-02 PROCEDURE — 99232 SBSQ HOSP IP/OBS MODERATE 35: CPT | Performed by: FAMILY MEDICINE

## 2019-04-02 PROCEDURE — 770006 HCHG ROOM/CARE - MED/SURG/GYN SEMI*

## 2019-04-02 PROCEDURE — A9270 NON-COVERED ITEM OR SERVICE: HCPCS | Performed by: INTERNAL MEDICINE

## 2019-04-02 PROCEDURE — 700102 HCHG RX REV CODE 250 W/ 637 OVERRIDE(OP): Performed by: INTERNAL MEDICINE

## 2019-04-02 RX ADMIN — RISPERIDONE 0.5 MG: 0.5 TABLET, FILM COATED ORAL at 05:36

## 2019-04-02 RX ADMIN — OXYCODONE HYDROCHLORIDE 5 MG: 5 TABLET ORAL at 01:33

## 2019-04-02 RX ADMIN — HEPARIN SODIUM 5000 UNITS: 5000 INJECTION, SOLUTION INTRAVENOUS; SUBCUTANEOUS at 05:36

## 2019-04-02 RX ADMIN — HEPARIN SODIUM 5000 UNITS: 5000 INJECTION, SOLUTION INTRAVENOUS; SUBCUTANEOUS at 21:23

## 2019-04-02 RX ADMIN — RISPERIDONE 0.5 MG: 0.5 TABLET, FILM COATED ORAL at 17:17

## 2019-04-02 RX ADMIN — METOPROLOL TARTRATE 12.5 MG: 25 TABLET, FILM COATED ORAL at 17:17

## 2019-04-02 RX ADMIN — HEPARIN SODIUM 5000 UNITS: 5000 INJECTION, SOLUTION INTRAVENOUS; SUBCUTANEOUS at 14:38

## 2019-04-02 ASSESSMENT — PAIN SCALES - PAIN ASSESSMENT IN ADVANCED DEMENTIA (PAINAD)
TOTALSCORE: 4
CONSOLABILITY: DISTRACTED OR REASSURED BY VOICE/TOUCH
FACIALEXPRESSION: SAD, FRIGHTENED, FROWN
BODYLANGUAGE: TENSE, DISTRESSED PACING, FIDGETING
CONSOLABILITY: DISTRACTED OR REASSURED BY VOICE/TOUCH
NEGVOCALIZATION: OCCASIONAL MOAN/GROAN, LOW SPEECH, NEGATIVE/DISAPPROVING QUALITY
TOTALSCORE: 5
NEGVOCALIZATION: OCCASIONAL MOAN/GROAN, LOW SPEECH, NEGATIVE/DISAPPROVING QUALITY
FACIALEXPRESSION: SAD, FRIGHTENED, FROWN
BREATHING: NORMAL
BODYLANGUAGE: TENSE, DISTRESSED PACING, FIDGETING
BREATHING: OCCASIONAL LABORED BREATHING, SHORT PERIOD OF HYPERVENTILATION

## 2019-04-02 NOTE — PROGRESS NOTES
Hospital Medicine Daily Progress Note    Date of Service  4/2/2019    Chief Complaint  89 y.o. female admitted 3/25/2019 with falls/syncope and hip fracture    Hospital Course    89 y.o. female who presented 3/25/2019 with past medical history of Parkinson's disease, dementia, osteoporosis, paroxysmal atrial fibrillation, admitted with fall/syncope apparently patient was found on the floor by caretaker. EMS found patient in Afib/RVR. Due to concern for possible sepsis patient had pan CT that showed left-sided neck femoral fracture but no other acute findings, patient received broad-spectrum antibiotics. Admitted to telemetry, ortho consulted for hip fracture.      Interval Problem Update  3/26--Going for hip repair this AM, will need PT/OT/SLP afterwards  3/27: Laying in bed.  Lethargic.  Open her eyes to verbal stimulus but then drift back to sleep quickly.  No acute distress noted.  3/28: More awake today but confused and agitated.  Requiring physical restraints.  Disoriented to time and place.  Inattentive.  3/29: Confused.  Still requiring restraints.  Pulled her Davis catheter but no hematuria noted.  Bladder scan with no retention noted.  Failed swallow evaluation.  POA refusing any tube feeding as per patient wishes.  No acute distress noted.  3/30:   The patient appears calm during my examination but she has poor memory and pulled on lines and impulsive per nursing staff.  She is still on restraints.  Will start risperidone and monitor.  Will consider removal of restraints tomorrow.   Discussed with the patient's POA who is sitting at bedside.   3/31:  The patient appears more calm and cooperative this morning.  The patient's friends are at bedside.  I advised the patient's primary nurse to keep the patient off restraints and monitor the patient's behaviors.  4/1:  The patient has been off restraints for more than 24 hours.  She remains calm in bed.     4/2: Resting comfortably in bed.  Pleasantly demented.   Does not have any restraints.  Follow simple commands.  Mental status at baseline.  No distress noted.  No issues overnight reported per staff.  Consultants/Specialty  Ortho  Palliative  Code Status  DNR/DNI    Disposition  Pending SNF acceptance.  Needs to remove physical restraints prior to discharge.  The patient will likely need long-term placement because the patient's prior living situation is by herself.       Review of Systems  Review of Systems   Unable to perform ROS: Dementia    review of systems limited due to the patient's dementia.     Physical Exam  Temp:  [36.4 °C (97.5 °F)-37.2 °C (98.9 °F)] 36.8 °C (98.2 °F)  Pulse:  [77-89] 89  Resp:  [16-18] 17  BP: (100-149)/(50-63) 131/58  SpO2:  [92 %-99 %] 99 %    Physical Exam   Constitutional: She appears cachectic. No distress.   Frail.     HENT:   Head: Normocephalic and atraumatic.   Mouth/Throat: Oropharynx is clear and moist.   Eyes: Pupils are equal, round, and reactive to light. Conjunctivae are normal. No scleral icterus.   Neck: Normal range of motion. No tracheal deviation present. No thyromegaly present.   Cardiovascular: Normal rate, regular rhythm and normal heart sounds.  Exam reveals no gallop and no friction rub.    Pulmonary/Chest: Effort normal. No respiratory distress. She has no wheezes.   Abdominal: Soft. She exhibits no distension. There is no tenderness.   Musculoskeletal: She exhibits no tenderness or deformity.   Diffuse muscle wasting   Multiple old ecchymosis and bruises on upper extremities.    Neurological: She is alert.   Oriented to person. Not to time and place.   Skin: Skin is warm and dry. She is not diaphoretic.   Psychiatric: Cognition and memory are impaired. She expresses impulsivity. She exhibits abnormal recent memory and abnormal remote memory.   Nursing note and vitals reviewed.    Fluids    Intake/Output Summary (Last 24 hours) at 04/02/19 1405  Last data filed at 04/01/19 1800   Gross per 24 hour   Intake                 0 ml   Output                2 ml   Net               -2 ml     Laboratory  Recent Labs      03/31/19   0346   WBC  5.9   RBC  3.08*   HEMOGLOBIN  9.3*   HEMATOCRIT  29.2*   MCV  94.8   MCH  30.2   MCHC  31.8*   RDW  45.2   PLATELETCT  179   MPV  9.9     Recent Labs      03/31/19   0346   SODIUM  139   POTASSIUM  3.4*   CHLORIDE  103   CO2  31   GLUCOSE  152*   BUN  27*   CREATININE  0.50   CALCIUM  8.3*                   Imaging  EC-ECHOCARDIOGRAM COMPLETE W/O CONT   Final Result      DX-PELVIS-1 OR 2 VIEWS   Final Result      Left hip arthroplasty.      CT-CHEST,ABDOMEN,PELVIS WITH   Final Result      1.  Acute left femoral neck fracture is identified.      2.  Fluid-containing structures in the pelvis bilaterally are identified. These could represent urinary bladder diverticula versus ovarian cysts or adnexal cysts.      3.  No solid organ injury identified.      4.  Small renal cysts are noted.      5.  Gallbladder appears distended and may contain a gallstone.      6.  Emphysema and areas of fibrosis are noted in the chest.      7.  Extensive atherosclerosis is noted throughout the aorta and its branch vessels.         CT-HEAD W/O   Final Result         NO ACUTE ABNORMALITIES ARE NOTED ON CT SCAN OF THE HEAD.      Findings are consistent with atrophy.  Decreased attenuation in the periventricular white matter likely indicates microvascular ischemic disease.      DX-FEMUR-2+ LEFT   Final Result      Left femoral neck fracture with displacement      DX-PELVIS-1 OR 2 VIEWS   Final Result      1.  Acute left femoral neck fracture with displacement      2.  Right superior and inferior pubic rami fracture, acuity indeterminate      DX-CHEST-PORTABLE (1 VIEW)   Final Result      1.  Interstitial densities which could be fibrosis or edema      2.  Air lucency projecting over the left lung base which could be a contusion or much less likely etiology such as diaphragmatic injury. CT recommended for further  assessment           Assessment/Plan  Closed fracture of neck of left femur (HCC)- (present on admission)   Assessment & Plan    After a fall, had CT hip showing Acute left femoral neck fracture is identified, Ortho has been consulted  Patient underwent left hemiarthroplasty on 3/26/2019.  PT/OT.  the patient is off restraint for 24 hours.  Discussed discharge planning with social work.   The patient is safe for transition to a  post-acute facility.  Pending bed availability at SNF.       Moderate protein-calorie malnutrition (HCC)- (present on admission)   Assessment & Plan    poor p.o. intake.  No tube feeding per POA.  Palliative consulted.  The patient is able to tolerate oral diet.     Dysphagia- (present on admission)   Assessment & Plan    Initially failed swallow evaluation.  Per POA, patient does not want any tube feeding.  Speech following.  Now, the patient is on pureed diet.  Risk of aspiration still exist.  POA aware of that risk.  Palliative consulted.     Normocytic anemia- (present on admission)   Assessment & Plan    Monitor H&H. Adequate folate level. B12 level is low normal range.  Give supplement via IM x 1 dose. Continue with oral supplements.     Hyperglycemia- (present on admission)   Assessment & Plan    Hgb A1c is 5.9     Hypokalemia- (present on admission)   Assessment & Plan    Replacing     Paroxysmal atrial fibrillation (HCC)- (present on admission)   Assessment & Plan    Patient was found to be on A. fib with RVR, echocardiogram indicate LV EF of 75% and mild MR and TR.  Continue on low-dose metoprolol, patient most probably is not good candidate for anticoagulation due to risk of fall and age     Late onset Alzheimer's disease without behavioral disturbance- (present on admission)   Assessment & Plan    High risk for hospital-acquired delirium.  Avoid benzodiazepines and/or anti-cholinergic medications.  Avoid any sedatives or hypnotics.  Minimize lines.  Willfully catheter.  Daily  orientation  Keep off restraints.  Continue low dose Risperdal.      Hypercholesterolemia- (present on admission)   Assessment & Plan    Total cholesterol is 150 and LDL is 74.     Plan of care discussed with multidisciplinary team during rounds.    VTE prophylaxis: Heparin

## 2019-04-02 NOTE — PROGRESS NOTES
2 RN skin check completed with TERRY Seay.  Mepilex to sacrum, assessed under device yes. No new or potential pressure ulcers found.   Interventions in place: Q2H turns, barrier wipes and cream, waffle cushion.  L elbow red but blanching. Bruising to peroneum/ between thighs.

## 2019-04-02 NOTE — DISCHARGE PLANNING
Agency/Facility Name: [facility]  Spoke To: Judith  Outcome: Declined, no DC plan, no LTC beds.

## 2019-04-02 NOTE — PROGRESS NOTES
"   Orthopaedic PA Progress Note    Interval changes:Doing well. Awaiting skilled bed. Follow-Up: needs appointment with Dr. Lewis or Dr. Shannon at  [facility] at 10-14 days post-op for re-evaluation, staple removal and radiographs.  May substitute ASA    ROS - Patient denies any new issues. No chest pain, dyspnea, or fever.  Pain well controlled.    Blood pressure 131/58, pulse 89, temperature 36.8 °C (98.2 °F), temperature source Temporal, resp. rate 17, height 1.575 m (5' 2\"), weight 43.5 kg (96 lb), SpO2 99 %, not currently breastfeeding.    Patient seen and examined  No acute distress  Breathing non labored  RRR  Surgical dressing is clean, dry, and intact. Patient clearly fires tibialis anterior, EHL, and gastrocnemius/soleus. Sensation is intact to light touch throughout superficial peroneal, deep peroneal, tibial, saphenous, and sural nerve distributions. Strong and palpable 2+ dorsalis pedis and posterior tibial pulses with capillary refill less than 2 seconds. No lower leg tenderness or discomfort.    Recent Labs      03/31/19   0346   WBC  5.9   RBC  3.08*   HEMOGLOBIN  9.3*   HEMATOCRIT  29.2*   MCV  94.8   MCH  30.2   MCHC  31.8*   RDW  45.2   PLATELETCT  179   MPV  9.9       Active Hospital Problems    Diagnosis   • Closed fracture of neck of left femur (HCC) [S72.002A]     Priority: High   • Late onset Alzheimer's disease without behavioral disturbance [G30.1, F02.80]     Priority: Low   • Hypercholesterolemia [E78.00]     Priority: Low   • Dysphagia [R13.10]   • Moderate protein-calorie malnutrition (HCC) [E44.0]   • Normocytic anemia [D64.9]   • Hypokalemia [E87.6]   • Hyperglycemia [R73.9]   • Paroxysmal atrial fibrillation (HCC) [I48.0]     Assessment/Plan:  POD#7 S/P L hip ave  Wt bearing status - AT  PT/OT-initiated  Wound care:dressing left in place  Drains - no  Davis-no  Sutures/Staples out- 10-14 days post operatively  Antibiotics: complete  DVT Prophylaxis- TEDS/SCDs/Foot " pumps.   UFH 5000u q8h Duration-until ambulatory > 150'  Future Procedures - not anticipated  Case Coordination for Discharge Planning - Disposition SNF, cleared from Ortho standpoint, may transition to ASA 81 mg PO BID on discharge / transfer in lieu of UFH

## 2019-04-02 NOTE — DISCHARGE PLANNING
Agency/Facility Name: [facility], [place], [facility], [facility] and [facility]  Referral sent per [facility] form @ 3000

## 2019-04-02 NOTE — CARE PLAN
Problem: Communication  Goal: The ability to communicate needs accurately and effectively will improve  Outcome: PROGRESSING SLOWER THAN EXPECTED  Pt. Needs reinforcement to use of call light, and has some impulsiveness. Room close to nurses station.    Problem: Safety  Goal: Will remain free from injury  Outcome: PROGRESSING AS EXPECTED  Assessed factors contributing to possible injury. Hourly rounds in place

## 2019-04-03 PROCEDURE — A9270 NON-COVERED ITEM OR SERVICE: HCPCS | Performed by: INTERNAL MEDICINE

## 2019-04-03 PROCEDURE — 770006 HCHG ROOM/CARE - MED/SURG/GYN SEMI*

## 2019-04-03 PROCEDURE — 97530 THERAPEUTIC ACTIVITIES: CPT

## 2019-04-03 PROCEDURE — 700111 HCHG RX REV CODE 636 W/ 250 OVERRIDE (IP): Performed by: FAMILY MEDICINE

## 2019-04-03 PROCEDURE — 700102 HCHG RX REV CODE 250 W/ 637 OVERRIDE(OP): Performed by: HOSPITALIST

## 2019-04-03 PROCEDURE — A9270 NON-COVERED ITEM OR SERVICE: HCPCS | Performed by: HOSPITALIST

## 2019-04-03 PROCEDURE — 92526 ORAL FUNCTION THERAPY: CPT

## 2019-04-03 PROCEDURE — 99232 SBSQ HOSP IP/OBS MODERATE 35: CPT | Performed by: FAMILY MEDICINE

## 2019-04-03 PROCEDURE — A9270 NON-COVERED ITEM OR SERVICE: HCPCS | Performed by: ORTHOPAEDIC SURGERY

## 2019-04-03 PROCEDURE — 700102 HCHG RX REV CODE 250 W/ 637 OVERRIDE(OP): Performed by: INTERNAL MEDICINE

## 2019-04-03 PROCEDURE — 700112 HCHG RX REV CODE 229: Performed by: ORTHOPAEDIC SURGERY

## 2019-04-03 PROCEDURE — 97535 SELF CARE MNGMENT TRAINING: CPT

## 2019-04-03 RX ORDER — LORAZEPAM 1 MG/1
1 TABLET ORAL ONCE
Status: COMPLETED | OUTPATIENT
Start: 2019-04-03 | End: 2019-04-03

## 2019-04-03 RX ADMIN — METOPROLOL TARTRATE 12.5 MG: 25 TABLET, FILM COATED ORAL at 18:37

## 2019-04-03 RX ADMIN — HEPARIN SODIUM 5000 UNITS: 5000 INJECTION, SOLUTION INTRAVENOUS; SUBCUTANEOUS at 05:54

## 2019-04-03 RX ADMIN — DOCUSATE SODIUM 100 MG: 100 CAPSULE, LIQUID FILLED ORAL at 18:38

## 2019-04-03 RX ADMIN — HEPARIN SODIUM 5000 UNITS: 5000 INJECTION, SOLUTION INTRAVENOUS; SUBCUTANEOUS at 14:33

## 2019-04-03 RX ADMIN — LORAZEPAM 1 MG: 1 TABLET ORAL at 04:05

## 2019-04-03 RX ADMIN — RISPERIDONE 0.5 MG: 0.5 TABLET, FILM COATED ORAL at 18:37

## 2019-04-03 ASSESSMENT — COGNITIVE AND FUNCTIONAL STATUS - GENERAL
EATING MEALS: A LOT
HELP NEEDED FOR BATHING: A LOT
MOVING FROM LYING ON BACK TO SITTING ON SIDE OF FLAT BED: UNABLE
TURNING FROM BACK TO SIDE WHILE IN FLAT BAD: UNABLE
CLIMB 3 TO 5 STEPS WITH RAILING: TOTAL
MOVING TO AND FROM BED TO CHAIR: UNABLE
DRESSING REGULAR UPPER BODY CLOTHING: A LOT
SUGGESTED CMS G CODE MODIFIER MOBILITY: CM
MOBILITY SCORE: 7
WALKING IN HOSPITAL ROOM: TOTAL
STANDING UP FROM CHAIR USING ARMS: A LOT
TOILETING: A LOT
DAILY ACTIVITIY SCORE: 13
DRESSING REGULAR LOWER BODY CLOTHING: A LOT
PERSONAL GROOMING: A LITTLE
SUGGESTED CMS G CODE MODIFIER DAILY ACTIVITY: CL

## 2019-04-03 ASSESSMENT — PAIN SCALES - PAIN ASSESSMENT IN ADVANCED DEMENTIA (PAINAD)
CONSOLABILITY: DISTRACTED OR REASSURED BY VOICE/TOUCH
NEGVOCALIZATION: OCCASIONAL MOAN/GROAN, LOW SPEECH, NEGATIVE/DISAPPROVING QUALITY
BODYLANGUAGE: TENSE, DISTRESSED PACING, FIDGETING
BODYLANGUAGE: RELAXED
BREATHING: OCCASIONAL LABORED BREATHING, SHORT PERIOD OF HYPERVENTILATION
FACIALEXPRESSION: SAD, FRIGHTENED, FROWN
BREATHING: NORMAL
CONSOLABILITY: DISTRACTED OR REASSURED BY VOICE/TOUCH
TOTALSCORE: 0
FACIALEXPRESSION: SMILING OR INEXPRESSIVE
FACIALEXPRESSION: SAD, FRIGHTENED, FROWN
CONSOLABILITY: NO NEED TO CONSOLE
CONSOLABILITY: DISTRACTED OR REASSURED BY VOICE/TOUCH
BREATHING: NORMAL
FACIALEXPRESSION: SAD, FRIGHTENED, FROWN
BODYLANGUAGE: TENSE, DISTRESSED PACING, FIDGETING
TOTALSCORE: 5
BODYLANGUAGE: RELAXED
TOTALSCORE: 5
BREATHING: OCCASIONAL LABORED BREATHING, SHORT PERIOD OF HYPERVENTILATION
NEGVOCALIZATION: OCCASIONAL MOAN/GROAN, LOW SPEECH, NEGATIVE/DISAPPROVING QUALITY
CONSOLABILITY: NO NEED TO CONSOLE
BREATHING: OCCASIONAL LABORED BREATHING, SHORT PERIOD OF HYPERVENTILATION
TOTALSCORE: 0
NEGVOCALIZATION: OCCASIONAL MOAN/GROAN, LOW SPEECH, NEGATIVE/DISAPPROVING QUALITY
TOTALSCORE: 5
FACIALEXPRESSION: SMILING OR INEXPRESSIVE
BODYLANGUAGE: TENSE, DISTRESSED PACING, FIDGETING

## 2019-04-03 ASSESSMENT — GAIT ASSESSMENTS: GAIT LEVEL OF ASSIST: UNABLE TO PARTICIPATE

## 2019-04-03 NOTE — CARE PLAN
Problem: Venous Thromboembolism (VTW)/Deep Vein Thrombosis (DVT) Prevention:  Goal: Patient will participate in Venous Thrombosis (VTE)/Deep Vein Thrombosis (DVT)Prevention Measures  Outcome: PROGRESSING SLOWER THAN EXPECTED  Pt continues to remove SCDs to BLE. Pt provided education. NO learning evidence at this time. Unfractionated heparin given per MAR for pharmacological DVT prophylaxis.     Problem: Bowel/Gastric:  Goal: Will not experience complications related to bowel motility  Outcome: PROGRESSING AS EXPECTED  Pt had a bowel movement 4/2.  Bowel sounds hyperactive in all four quadrants.

## 2019-04-03 NOTE — CARE PLAN
Problem: Safety  Goal: Will remain free from injury  Outcome: PROGRESSING AS EXPECTED  Assessed and removed possible factor that would contribute to injury in room. Hourly rounds in place    Problem: Mobility  Goal: Risk for activity intolerance will decrease  Outcome: PROGRESSING AS EXPECTED    Intervention: Encourage patient to increase activity level in collaboration with Interdisciplinary Team  Communicated POC with PT/OT. Pt compliant and verbalized understanding to activities

## 2019-04-03 NOTE — THERAPY
"Speech Language Therapy dysphagia treatment completed.   Functional Status:  Pt seen on this date for dysphagia treatment. Pt asleep upon entry and woke with max verbal cues and sternal rub. Pt lethargic initially, however, woke up as session progressed and was able to maintain alertness with lunch. Per RN, pt with intermittent coughing during meals. PO trials of pt's dysphagia I/NTL diet were presented to pt and intermittent coughing and wet voice following PO were noted, which is concerning for aspiration. Upon palpation, laryngeal elevation was weak and swallow trigger delayed 4-5 seconds initially, however, as session progressed, delayed swallow trigger up to 45 seconds noted. Pt reporting fatigue so no further PO trials provided. Due to lethargy, no swallowing exercises were attempted. Pt caregiver present at bedside for a few minute during session and attempted to feed pt at a quick pace. Provided education to caregiver to reduce pace of PO bites to reduce risk of aspiration and she verbalized understanding. At this time, would recommend NPO with consideration for non-oral source of nutrition, however, pt and POA are choosing for pt to eat despite risk so would recommend continuation of dysphagia I/NTL diet which will reduce but not completely eliminate the risk of aspiration. Role of SLP education provided to pt and she demonstrated understanding, however, will need ongoing education. SLP following.    Recommendations: continuation of dysphagia I/NTL diet with 1:1 direct supervision despite risk   Plan of Care: Will benefit from Speech Therapy 3 times per week  Post-Acute Therapy: Recommend inpatient transitional care services for continued speech therapy services.        See \"Rehab Therapy-Acute\" Patient Summary Report for complete documentation.     "

## 2019-04-03 NOTE — THERAPY
"Physical Therapy Treatment completed.   Bed Mobility:  Supine to Sit: Maximal Assist  Transfers: Sit to Stand: Maximal Assist  Gait: Level Of Assist: Unable to Participate  Plan of Care: Will benefit from Physical Therapy 4 times per week  Discharge Recommendations: Equipment: Will Continue to Assess for Equipment Needs. Post-acute therapy Recommend inpatient transitional care services for continued physical therapy services.        See \"Rehab Therapy-Acute\" Patient Summary Report for complete documentation.     Pt was pleasant very lethargic but easily awoken. She required maxA to get to EOB. Pt at EOB intermittently required Christi for trunk trunk during static and dynamic balance with UE movement. She was able to perform multiple sit to stands with maxA, ble knee blocking and tactile cues at pelvis for posture. Pt required extra time due to being lethargic but responded to single step commands appropriately. Due to current mobility level pt will benefit from post acute therapy prior to dc home. Will continue to follow while in house.   "

## 2019-04-03 NOTE — THERAPY
"Occupational Therapy Treatment completed with focus on ADLs.  Functional Status: Supine>sit with max A. Mod A for UB dressing with gown. Req min A for sitting balance. Combed hair with min A, and washed face with SPV. Sit>stand with Mod A x3. Sitting balance improved to CGA. Sit>supine with mod A.   Plan of Care: Will benefit from Occupational Therapy 3 times per week  Discharge Recommendations:  Equipment Will Continue to Assess for Equipment Needs. Post-acute therapy: Recommend inpatient transitional care services for continued occupational therapy services.       See \"Rehab Therapy-Acute\" Patient Summary Report for complete documentation.     Pt seen for OT session. Pt more lethargic this session, and less participatory, but with improved command following. Continues to be limited by decreased functional mobility, activity tolerance, cognition, strength, coordination, balance, and pain which are currently affecting pt's ability to complete ADLs/IADLs at baseline. Currently recommend acute OT, and inpatient transitional care services for continued occupational therapy services.       "

## 2019-04-03 NOTE — DISCHARGE PLANNING
Agency/Facility Name: [facility]  Spoke To: Katya   Outcome: Declined, behaviors, will need LTC    Agency/Facility Name: [facility]  Left message for admissions    [facility]ency[facility]Facility Name: [facility]  Left message with Kenny for status of referral.

## 2019-04-03 NOTE — DISCHARGE PLANNING
Agency/Facility Name: [facility]  Fax notification   Outcome: Declined, agitation, no skilled need

## 2019-04-03 NOTE — DISCHARGE PLANNING
[facility]ency[facility]Facility Name: [facility]  Spoke To: Kenny  Outcome: Declined, not contracted with insurance

## 2019-04-03 NOTE — DOCUMENTATION QUERY
"                                                                         On license of [facility]                                                                         Query Response Note      PATIENT:               MOISES ARMENTA  ACCT #:                  [id number]  MRN:                       [id number]  :                       1920  ADMIT DATE:       3/25/2019 11:12 AM  DISCH DATE:          RESPONDING  PROVIDER #:        [id number]           RESPONSE TEXT:    Sepsis has been ruled out    QUERY TEXT:    Sepsis/SIRS Clarification [email or web address]    \"Concern for possible sepsis\" is documented in the H&P, Progress Notes and DC Summary. Please clarify whether:    NOTE:  If an appropriate response is not listed below, please respond with a new note.    The patient's Clinical Indicators include:  3/25 Admit SIRS 2/4 (WBC's: 16.3; HR: 91); No documented infection   3/25 Lactic Acid: 5.9; BC's x 2: Negative  3/25 H&P: Closed fracture left femur; lactic acidosis likely due to dehydration; fall; leukocytosis; paroxysmal atrial fibrillation  Treatment: IVNS; zosyn; lab testing; imaging  Risk Factors: Advanced age; fracture left femur  Query created by: Ofelia Barraza on 4/3/2019 9:20 AM        Electronically signed by:  ANETA MELCHOR MD 4/3/2019 12:35 PM         "

## 2019-04-03 NOTE — CARE PLAN
Problem: Nutritional:  Goal: Achieve adequate nutritional intake  Advance diet as feasible and patient will consume ~50% of meals   Outcome: PROGRESSING AS EXPECTED  Per chart pt PO varies < 25-75% of meals and < % of supplements. Pt continues to receive Boost [facility] TID. Please document intake of all meals as % taken in ADL's to provide interdisciplinary communication across all shifts. RD will continue to follow.

## 2019-04-03 NOTE — PROGRESS NOTES
"   Orthopaedic PA Progress Note    Interval changes:Pt awaitiong placement. Skilled need post op hemiarthroplasty L hip. May discontinue UFH and start ASA at time of transfer from Ortho standpoint    ROS - Patient denies any new issues. No chest pain, dyspnea, or fever.  Pain well controlled.    /67   Pulse 90   Temp 36.4 °C (97.5 °F) (Temporal)   Resp 18   Ht 1.575 m (5' 2\")   Wt 43.5 kg (96 lb)   SpO2 91%     Patient seen and examined  No acute distress  Breathing non labored  RRR  Surgical dressing is clean, dry, and intact. Patient clearly fires tibialis anterior, EHL, and gastrocnemius/soleus. Sensation is intact to light touch throughout superficial peroneal, deep peroneal, tibial, saphenous, and sural nerve distributions. Strong and palpable 2+ dorsalis pedis and posterior tibial pulses with capillary refill less than 2 seconds. No lower leg tenderness or discomfort.    Active Hospital Problems    Diagnosis   • Closed fracture of neck of left femur (HCC) [S72.002A]     Priority: High   • Late onset Alzheimer's disease without behavioral disturbance [G30.1, F02.80]     Priority: Low   • Hypercholesterolemia [E78.00]     Priority: Low   • Dysphagia [R13.10]   • Moderate protein-calorie malnutrition (HCC) [E44.0]   • Normocytic anemia [D64.9]   • Hypokalemia [E87.6]   • Hyperglycemia [R73.9]   • Paroxysmal atrial fibrillation (HCC) [I48.0]        Assessment/Plan:  POD#8 S/P L hip ave  Wt bearing status - AT  PT/OT-initiated  Wound care:dressing left in place  Drains - no  Davis-no  Sutures/Staples out- 10-14 days post operatively  Antibiotics: complete  DVT Prophylaxis- TEDS/SCDs/Foot pumps.   UFH 5000u q8h Duration-until ambulatory > 150'  Future Procedures - not anticipated  Case Coordination for Discharge Planning - Disposition SNF, cleared from Ortho standpoint, may transition to ASA 81 mg PO BID on discharge / transfer in lieu of UFH  "

## 2019-04-03 NOTE — PROGRESS NOTES
"Pt is a high fall risk. Pt continues to attempt to get out of bed every fifteen minutes. Pt previously threatening this nurse. Pt stated, \" I'm going to kick you right in the mouth\", when attempting to stop pt from falling while getting out of bed. Another nurse responded to bed alarm, (Kiki), and reported to this nurse that pt was, \" (She was) shoving me and threatening me, when I tried to get her back into bed\". MD Anglin paged.   "

## 2019-04-04 PROBLEM — R73.03 PREDIABETES: Status: ACTIVE | Noted: 2019-03-25

## 2019-04-04 LAB
ALBUMIN SERPL BCP-MCNC: 2.5 G/DL (ref 3.2–4.9)
ALBUMIN/GLOB SERPL: 1.1 G/DL
ALP SERPL-CCNC: 97 U/L (ref 30–99)
ALT SERPL-CCNC: 64 U/L (ref 2–50)
ANION GAP SERPL CALC-SCNC: 5 MMOL/L (ref 0–11.9)
AST SERPL-CCNC: 39 U/L (ref 12–45)
BASOPHILS # BLD AUTO: 0.3 % (ref 0–1.8)
BASOPHILS # BLD: 0.02 K/UL (ref 0–0.12)
BILIRUB SERPL-MCNC: 0.6 MG/DL (ref 0.1–1.5)
BUN SERPL-MCNC: 29 MG/DL (ref 8–22)
CALCIUM SERPL-MCNC: 8.2 MG/DL (ref 8.5–10.5)
CHLORIDE SERPL-SCNC: 107 MMOL/L (ref 96–112)
CO2 SERPL-SCNC: 29 MMOL/L (ref 20–33)
CREAT SERPL-MCNC: 0.6 MG/DL (ref 0.5–1.4)
EOSINOPHIL # BLD AUTO: 0.14 K/UL (ref 0–0.51)
EOSINOPHIL NFR BLD: 2.3 % (ref 0–6.9)
ERYTHROCYTE [DISTWIDTH] IN BLOOD BY AUTOMATED COUNT: 49.8 FL (ref 35.9–50)
GLOBULIN SER CALC-MCNC: 2.2 G/DL (ref 1.9–3.5)
GLUCOSE SERPL-MCNC: 128 MG/DL (ref 65–99)
HCT VFR BLD AUTO: 30.9 % (ref 37–47)
HGB BLD-MCNC: 10 G/DL (ref 12–16)
IMM GRANULOCYTES # BLD AUTO: 0.04 K/UL (ref 0–0.11)
IMM GRANULOCYTES NFR BLD AUTO: 0.7 % (ref 0–0.9)
LYMPHOCYTES # BLD AUTO: 0.95 K/UL (ref 1–4.8)
LYMPHOCYTES NFR BLD: 15.8 % (ref 22–41)
MAGNESIUM SERPL-MCNC: 1.9 MG/DL (ref 1.5–2.5)
MCH RBC QN AUTO: 31.1 PG (ref 27–33)
MCHC RBC AUTO-ENTMCNC: 32.4 G/DL (ref 33.6–35)
MCV RBC AUTO: 96 FL (ref 81.4–97.8)
MONOCYTES # BLD AUTO: 0.64 K/UL (ref 0–0.85)
MONOCYTES NFR BLD AUTO: 10.6 % (ref 0–13.4)
NEUTROPHILS # BLD AUTO: 4.22 K/UL (ref 2–7.15)
NEUTROPHILS NFR BLD: 70.3 % (ref 44–72)
NRBC # BLD AUTO: 0.02 K/UL
NRBC BLD-RTO: 0.3 /100 WBC
PLATELET # BLD AUTO: 215 K/UL (ref 164–446)
PMV BLD AUTO: 9.9 FL (ref 9–12.9)
POTASSIUM SERPL-SCNC: 3.6 MMOL/L (ref 3.6–5.5)
PROT SERPL-MCNC: 4.7 G/DL (ref 6–8.2)
RBC # BLD AUTO: 3.22 M/UL (ref 4.2–5.4)
SODIUM SERPL-SCNC: 141 MMOL/L (ref 135–145)
WBC # BLD AUTO: 6 K/UL (ref 4.8–10.8)

## 2019-04-04 PROCEDURE — A9270 NON-COVERED ITEM OR SERVICE: HCPCS | Performed by: HOSPITALIST

## 2019-04-04 PROCEDURE — 36415 COLL VENOUS BLD VENIPUNCTURE: CPT

## 2019-04-04 PROCEDURE — 700111 HCHG RX REV CODE 636 W/ 250 OVERRIDE (IP): Performed by: FAMILY MEDICINE

## 2019-04-04 PROCEDURE — 700102 HCHG RX REV CODE 250 W/ 637 OVERRIDE(OP): Performed by: INTERNAL MEDICINE

## 2019-04-04 PROCEDURE — 85025 COMPLETE CBC W/AUTO DIFF WBC: CPT

## 2019-04-04 PROCEDURE — 700112 HCHG RX REV CODE 229: Performed by: ORTHOPAEDIC SURGERY

## 2019-04-04 PROCEDURE — 700102 HCHG RX REV CODE 250 W/ 637 OVERRIDE(OP): Performed by: HOSPITALIST

## 2019-04-04 PROCEDURE — 80053 COMPREHEN METABOLIC PANEL: CPT

## 2019-04-04 PROCEDURE — 302146: Performed by: FAMILY MEDICINE

## 2019-04-04 PROCEDURE — A9270 NON-COVERED ITEM OR SERVICE: HCPCS | Performed by: INTERNAL MEDICINE

## 2019-04-04 PROCEDURE — 83735 ASSAY OF MAGNESIUM: CPT

## 2019-04-04 PROCEDURE — 99232 SBSQ HOSP IP/OBS MODERATE 35: CPT | Performed by: FAMILY MEDICINE

## 2019-04-04 PROCEDURE — 770006 HCHG ROOM/CARE - MED/SURG/GYN SEMI*

## 2019-04-04 PROCEDURE — 97530 THERAPEUTIC ACTIVITIES: CPT

## 2019-04-04 PROCEDURE — A9270 NON-COVERED ITEM OR SERVICE: HCPCS | Performed by: ORTHOPAEDIC SURGERY

## 2019-04-04 RX ORDER — QUETIAPINE FUMARATE 25 MG/1
25 TABLET, FILM COATED ORAL EVERY 8 HOURS PRN
Status: DISCONTINUED | OUTPATIENT
Start: 2019-04-04 | End: 2019-04-07

## 2019-04-04 RX ADMIN — METOPROLOL TARTRATE 12.5 MG: 25 TABLET, FILM COATED ORAL at 17:53

## 2019-04-04 RX ADMIN — DOCUSATE SODIUM 100 MG: 100 CAPSULE, LIQUID FILLED ORAL at 04:59

## 2019-04-04 RX ADMIN — RISPERIDONE 0.5 MG: 0.5 TABLET, FILM COATED ORAL at 04:59

## 2019-04-04 RX ADMIN — RISPERIDONE 0.5 MG: 0.5 TABLET, FILM COATED ORAL at 17:53

## 2019-04-04 RX ADMIN — OXYCODONE HYDROCHLORIDE 5 MG: 5 TABLET ORAL at 19:37

## 2019-04-04 RX ADMIN — METOPROLOL TARTRATE 12.5 MG: 25 TABLET, FILM COATED ORAL at 04:59

## 2019-04-04 RX ADMIN — HEPARIN SODIUM 5000 UNITS: 5000 INJECTION, SOLUTION INTRAVENOUS; SUBCUTANEOUS at 05:00

## 2019-04-04 RX ADMIN — QUETIAPINE FUMARATE 25 MG: 25 TABLET ORAL at 22:34

## 2019-04-04 RX ADMIN — HEPARIN SODIUM 5000 UNITS: 5000 INJECTION, SOLUTION INTRAVENOUS; SUBCUTANEOUS at 14:04

## 2019-04-04 RX ADMIN — OXYCODONE HYDROCHLORIDE 5 MG: 5 TABLET ORAL at 22:35

## 2019-04-04 ASSESSMENT — COGNITIVE AND FUNCTIONAL STATUS - GENERAL
WALKING IN HOSPITAL ROOM: TOTAL
MOBILITY SCORE: 6
CLIMB 3 TO 5 STEPS WITH RAILING: TOTAL
STANDING UP FROM CHAIR USING ARMS: TOTAL
TURNING FROM BACK TO SIDE WHILE IN FLAT BAD: UNABLE
MOVING FROM LYING ON BACK TO SITTING ON SIDE OF FLAT BED: UNABLE
SUGGESTED CMS G CODE MODIFIER MOBILITY: CN
MOVING TO AND FROM BED TO CHAIR: UNABLE

## 2019-04-04 ASSESSMENT — GAIT ASSESSMENTS: GAIT LEVEL OF ASSIST: UNABLE TO PARTICIPATE

## 2019-04-04 NOTE — CARE PLAN
Problem: Infection  Goal: Will remain free from infection  Outcome: PROGRESSING AS EXPECTED  Hand hygiene performed before and after patient contact. Standard precautions implemented.    Problem: Skin Integrity  Goal: Risk for impaired skin integrity will decrease  Outcome: PROGRESSING AS EXPECTED  Q2h turns in place. Barrier cream applied to sheila area. Pt is being assessed frequently for incontinence. Pillows in use for support/positioning and to float heels.

## 2019-04-04 NOTE — PROGRESS NOTES
"   Orthopaedic PA Progress Note    Interval changes: Resting comfortably. Incision well approximated. Dressing removed by patient, new dressing placed by RN.    ROS - Patient denies any new issues. No chest pain, dyspnea, or fever.  Pain well controlled.    /46   Pulse 71   Temp 36.3 °C (97.3 °F) (Temporal)   Resp 16   Ht 1.575 m (5' 2\")   Wt 43.5 kg (96 lb)   SpO2 90%     Patient seen and examined  No acute distress  Breathing non labored  RRR  Surgical dressing is clean, dry, and intact. Patient clearly fires tibialis anterior, EHL, and gastrocnemius/soleus. Sensation is intact to light touch throughout superficial peroneal, deep peroneal, tibial, saphenous, and sural nerve distributions. Strong and palpable 2+ dorsalis pedis and posterior tibial pulses with capillary refill less than 2 seconds. No lower leg tenderness or discomfort.    Recent Labs      04/04/19   1155   WBC  6.0   RBC  3.22*   HEMOGLOBIN  10.0*   HEMATOCRIT  30.9*   MCV  96.0   MCH  31.1   MCHC  32.4*   RDW  49.8   PLATELETCT  215   MPV  9.9       Active Hospital Problems    Diagnosis   • Closed fracture of neck of left femur (HCC) [S72.002A]     Priority: High   • Dysphagia [R13.10]     Priority: Medium   • Hypokalemia [E87.6]     Priority: Medium   • Late onset Alzheimer's disease without behavioral disturbance [G30.1, F02.80]     Priority: Low   • Hypercholesterolemia [E78.00]     Priority: Low   • Moderate protein-calorie malnutrition (HCC) [E44.0]   • Normocytic anemia [D64.9]   • Hyperglycemia [R73.9]   • Paroxysmal atrial fibrillation (HCC) [I48.0]     Assessment/Plan:  POD#9 S/P L hip ave  Wt bearing status - AT  PT/OT-initiated  Wound care:dressing left in place  Drains - no  Davis-no  Sutures/Staples out- 10-14 days post operatively  Antibiotics: complete  DVT Prophylaxis- TEDS/SCDs/Foot pumps.   UFH 5000u q8h Duration-until ambulatory > 150'  Future Procedures - not anticipated  Case Coordination for Discharge Planning - " Disposition SNF, cleared from Ortho standpoint, may transition to ASA 81 mg PO BID on discharge / transfer in lieu of UFH

## 2019-04-04 NOTE — PALLIATIVE CARE
Palliative Care follow-up  SNF not accepting pt d/t behaviors, no long term DC plan. Message left for SW to discuss alternatives and discussions with POA up to this point. PC will follow up when more information available from SW regarding their discussion with POA.    Plan: f/u when updated by SW    Thank you for allowing Palliative Care to participate in this patient's care. Please feel free to call [phone] with any questions or concerns.

## 2019-04-04 NOTE — CARE PLAN
Problem: Infection  Goal: Will remain free from infection  Outcome: PROGRESSING AS EXPECTED  Implemented hand hygiene and proper isolation precautions before and after interacting with patient to prevent spread of germs     Problem: Skin Integrity  Goal: Risk for impaired skin integrity will decrease  Outcome: PROGRESSING AS EXPECTED  Ensured Q2 turns to prevent further skin breakdown

## 2019-04-04 NOTE — PROGRESS NOTES
· 2 RN skin check complete with TERRY Reyes.   · Incision site over left hip is clean, dry and intact. Redness noted in bilateral ears, skin is blanching. Bruising noted over bilateral lower extremities and right upper extremity. Bruising noted in the sacral region.  · The following interventions in place: q2h turns, assessing for incontinence, barrier cream, pillows in use for support/positioning and to float heels.

## 2019-04-04 NOTE — THERAPY
"Physical Therapy Treatment completed.   Bed Mobility:  Supine to Sit: Maximal Assist  Transfers: Sit to Stand: Moderate Assist  Gait: Level Of Assist: Unable to Participate   Plan of Care: Will benefit from Physical Therapy 4 times per week  Discharge Recommendations: Equipment: Will Continue to Assess for Equipment Needs. Post-acute therapy Recommend inpatient transitional care services for continued physical therapy services.        See \"Rehab Therapy-Acute\" Patient Summary Report for complete documentation.     Pt was pleasant more alert today than previous tx session. She does continue to be pleasantly confused. She was unable to cite posterior hip precautions therefore required verbal cues to maintain. Today she was able to initiate bed mobility but required maxA to complete. Once at EOB pt able to hold self up with close SBA. She performed seated exercises and recquired extensive encouragement to practice transfers. She performed one sit to stand with modA and BLE knee blocking and HHA. She required facilitation at pelvis for correct posture. Due to current mobility level pt will benefit from post acute transitional care facility prior to dc home.   "

## 2019-04-04 NOTE — PROGRESS NOTES
Notified Rea Salinas of potassium result of 3.4, drawn on 3/31/19. Received telephone order for CBC/CMP lab draw and Magnesium.

## 2019-04-04 NOTE — PROGRESS NOTES
Notified LUCINA Carmen that pt removed dressing from left hip. Received telephone order to change dressing to left hip with island dressing starting today. Change every 48 hours or as needed. Per LUCINA Carmen.

## 2019-04-04 NOTE — PROGRESS NOTES
Hospital Medicine Daily Progress Note    Date of Service  4/3/2019    Chief Complaint  89 y.o. female admitted 3/25/2019 with falls/syncope and hip fracture    Hospital Course    89 y.o. female who presented 3/25/2019 with past medical history of Parkinson's disease, dementia, osteoporosis, paroxysmal atrial fibrillation, admitted with fall/syncope apparently patient was found on the floor by caretaker. EMS found patient in Afib/RVR. Due to concern for possible sepsis patient had pan CT that showed left-sided neck femoral fracture but no other acute findings, patient received broad-spectrum antibiotics. Admitted to telemetry, ortho consulted for hip fracture.      Interval Problem Update  3/26--Going for hip repair this AM, will need PT/OT/SLP afterwards  3/27: Laying in bed.  Lethargic.  Open her eyes to verbal stimulus but then drift back to sleep quickly.  No acute distress noted.  3/28: More awake today but confused and agitated.  Requiring physical restraints.  Disoriented to time and place.  Inattentive.  3/29: Confused.  Still requiring restraints.  Pulled her Davis catheter but no hematuria noted.  Bladder scan with no retention noted.  Failed swallow evaluation.  POA refusing any tube feeding as per patient wishes.  No acute distress noted.  3/30:   The patient appears calm during my examination but she has poor memory and pulled on lines and impulsive per nursing staff.  She is still on restraints.  Will start risperidone and monitor.  Will consider removal of restraints tomorrow.   Discussed with the patient's POA who is sitting at bedside.   3/31:  The patient appears more calm and cooperative this morning.  The patient's friends are at bedside.  I advised the patient's primary nurse to keep the patient off restraints and monitor the patient's behaviors.  4/1:  The patient has been off restraints for more than 24 hours.  She remains calm in bed.     4/2: Resting comfortably in bed.  Pleasantly demented.   Does not have any restraints.  Follow simple commands.  Mental status at baseline.  No distress noted.  No issues overnight reported per staff.  4/3: Laying in bed comfortably.  Off restraints.  No distress noted.  No issues overnight per staff.  No physical restraints.  Consultants/Specialty  Ortho  Palliative  Code Status  DNR/DNI    Disposition  Pending SNF acceptance.    Difficult discharge as there is no discharge planning after patient goes to SNF which makes acceptance challenging.    Review of Systems  Review of Systems   Unable to perform ROS: Dementia    review of systems limited due to the patient's dementia.     Physical Exam  Temp:  [36.3 °C (97.3 °F)-36.7 °C (98.1 °F)] 36.4 °C (97.5 °F)  Pulse:  [74-93] 74  Resp:  [15-18] 16  BP: (121-154)/(46-77) 131/46  SpO2:  [89 %-94 %] 91 %    Physical Exam   Constitutional: She appears cachectic. No distress.   Frail.     HENT:   Head: Normocephalic and atraumatic.   Eyes: Pupils are equal, round, and reactive to light. Conjunctivae are normal. Right eye exhibits no discharge. Left eye exhibits no discharge.   Neck: Normal range of motion. No tracheal deviation present. No thyromegaly present.   Cardiovascular: Normal rate, regular rhythm and normal heart sounds.  Exam reveals no gallop and no friction rub.    Pulmonary/Chest: Effort normal. No respiratory distress.   Abdominal: Soft. She exhibits no distension. There is no tenderness.   Musculoskeletal: She exhibits no tenderness or deformity.   Diffuse muscle wasting   Multiple old ecchymosis and bruises on upper extremities.    Neurological: She is alert.   Oriented to person. Not to time and place.   Skin: Skin is warm and dry. She is not diaphoretic.   Psychiatric: Cognition and memory are impaired. She expresses impulsivity. She exhibits abnormal recent memory and abnormal remote memory.   Nursing note and vitals reviewed.    Fluids    Intake/Output Summary (Last 24 hours) at 04/03/19 1142  Last data filed  at 04/02/19 2100   Gross per 24 hour   Intake               25 ml   Output                0 ml   Net               25 ml     Laboratory                        Imaging  EC-ECHOCARDIOGRAM COMPLETE W/O CONT   Final Result      DX-PELVIS-1 OR 2 VIEWS   Final Result      Left hip arthroplasty.      CT-CHEST,ABDOMEN,PELVIS WITH   Final Result      1.  Acute left femoral neck fracture is identified.      2.  Fluid-containing structures in the pelvis bilaterally are identified. These could represent urinary bladder diverticula versus ovarian cysts or adnexal cysts.      3.  No solid organ injury identified.      4.  Small renal cysts are noted.      5.  Gallbladder appears distended and may contain a gallstone.      6.  Emphysema and areas of fibrosis are noted in the chest.      7.  Extensive atherosclerosis is noted throughout the aorta and its branch vessels.         CT-HEAD W/O   Final Result         NO ACUTE ABNORMALITIES ARE NOTED ON CT SCAN OF THE HEAD.      Findings are consistent with atrophy.  Decreased attenuation in the periventricular white matter likely indicates microvascular ischemic disease.      DX-FEMUR-2+ LEFT   Final Result      Left femoral neck fracture with displacement      DX-PELVIS-1 OR 2 VIEWS   Final Result      1.  Acute left femoral neck fracture with displacement      2.  Right superior and inferior pubic rami fracture, acuity indeterminate      DX-CHEST-PORTABLE (1 VIEW)   Final Result      1.  Interstitial densities which could be fibrosis or edema      2.  Air lucency projecting over the left lung base which could be a contusion or much less likely etiology such as diaphragmatic injury. CT recommended for further assessment           Assessment/Plan  Closed fracture of neck of left femur (HCC)- (present on admission)   Assessment & Plan    After a fall, had CT hip showing Acute left femoral neck fracture is identified, Ortho has been consulted  Patient underwent left hemiarthroplasty on  3/26/2019.  PT/OT.  the patient is off restraint for 24 hours.  Discussed discharge planning with social work.   The patient is safe for transition to a  post-acute facility.  Pending bed availability at SNF.       Dysphagia- (present on admission)   Assessment & Plan    Initially failed swallow evaluation.  Per POA, patient does not want any tube feeding.  Speech following.  Now, the patient is on pureed diet.  Risk of aspiration still exist.  POA aware of that risk.  Palliative consulted.     Hypokalemia- (present on admission)   Assessment & Plan    Replacing     Moderate protein-calorie malnutrition (HCC)- (present on admission)   Assessment & Plan    poor p.o. intake.  No tube feeding per POA.  Palliative consulted.  The patient is able to tolerate oral diet.     Normocytic anemia- (present on admission)   Assessment & Plan    Monitor H&H. Adequate folate level. B12 level is low normal range.  Give supplement via IM x 1 dose. Continue with oral supplements.     Hyperglycemia- (present on admission)   Assessment & Plan    Hgb A1c is 5.9     Paroxysmal atrial fibrillation (HCC)- (present on admission)   Assessment & Plan    Patient was found to be on A. fib with RVR, echocardiogram indicate LV EF of 75% and mild MR and TR.  Continue on low-dose metoprolol, patient most probably is not good candidate for anticoagulation due to risk of fall and age     Late onset Alzheimer's disease without behavioral disturbance- (present on admission)   Assessment & Plan    High risk for hospital-acquired delirium.  Avoid benzodiazepines and/or anti-cholinergic medications.  Avoid any sedatives or hypnotics.  Minimize lines.  Willfully catheter.  Daily orientation  Keep off restraints.  Continue low dose Risperdal.      Hypercholesterolemia- (present on admission)   Assessment & Plan    Total cholesterol is 150 and LDL is 74.     Plan of care discussed with multidisciplinary team during rounds.    VTE prophylaxis: Heparin

## 2019-04-05 PROCEDURE — 700111 HCHG RX REV CODE 636 W/ 250 OVERRIDE (IP): Performed by: INTERNAL MEDICINE

## 2019-04-05 PROCEDURE — A9270 NON-COVERED ITEM OR SERVICE: HCPCS | Performed by: INTERNAL MEDICINE

## 2019-04-05 PROCEDURE — 700102 HCHG RX REV CODE 250 W/ 637 OVERRIDE(OP): Performed by: HOSPITALIST

## 2019-04-05 PROCEDURE — 97530 THERAPEUTIC ACTIVITIES: CPT

## 2019-04-05 PROCEDURE — 700112 HCHG RX REV CODE 229: Performed by: ORTHOPAEDIC SURGERY

## 2019-04-05 PROCEDURE — 770006 HCHG ROOM/CARE - MED/SURG/GYN SEMI*

## 2019-04-05 PROCEDURE — A9270 NON-COVERED ITEM OR SERVICE: HCPCS | Performed by: HOSPITALIST

## 2019-04-05 PROCEDURE — A9270 NON-COVERED ITEM OR SERVICE: HCPCS | Performed by: ORTHOPAEDIC SURGERY

## 2019-04-05 PROCEDURE — 700102 HCHG RX REV CODE 250 W/ 637 OVERRIDE(OP): Performed by: ORTHOPAEDIC SURGERY

## 2019-04-05 PROCEDURE — 99232 SBSQ HOSP IP/OBS MODERATE 35: CPT | Performed by: FAMILY MEDICINE

## 2019-04-05 PROCEDURE — 700111 HCHG RX REV CODE 636 W/ 250 OVERRIDE (IP): Performed by: FAMILY MEDICINE

## 2019-04-05 PROCEDURE — 700102 HCHG RX REV CODE 250 W/ 637 OVERRIDE(OP): Performed by: INTERNAL MEDICINE

## 2019-04-05 RX ORDER — HALOPERIDOL 5 MG/ML
4 INJECTION INTRAMUSCULAR
Status: COMPLETED | OUTPATIENT
Start: 2019-04-05 | End: 2019-04-05

## 2019-04-05 RX ADMIN — RISPERIDONE 0.5 MG: 0.5 TABLET, FILM COATED ORAL at 20:06

## 2019-04-05 RX ADMIN — METOPROLOL TARTRATE 12.5 MG: 25 TABLET, FILM COATED ORAL at 20:05

## 2019-04-05 RX ADMIN — METOPROLOL TARTRATE 12.5 MG: 25 TABLET, FILM COATED ORAL at 09:44

## 2019-04-05 RX ADMIN — HEPARIN SODIUM 5000 UNITS: 5000 INJECTION, SOLUTION INTRAVENOUS; SUBCUTANEOUS at 16:14

## 2019-04-05 RX ADMIN — HALOPERIDOL LACTATE 4 MG: 5 INJECTION, SOLUTION INTRAMUSCULAR at 01:14

## 2019-04-05 RX ADMIN — HEPARIN SODIUM 5000 UNITS: 5000 INJECTION, SOLUTION INTRAVENOUS; SUBCUTANEOUS at 09:45

## 2019-04-05 RX ADMIN — SENNOSIDES AND DOCUSATE SODIUM 1 TABLET: 8.6; 5 TABLET ORAL at 20:06

## 2019-04-05 RX ADMIN — HEPARIN SODIUM 5000 UNITS: 5000 INJECTION, SOLUTION INTRAVENOUS; SUBCUTANEOUS at 21:47

## 2019-04-05 RX ADMIN — RISPERIDONE 0.5 MG: 0.5 TABLET, FILM COATED ORAL at 09:45

## 2019-04-05 RX ADMIN — DOCUSATE SODIUM 100 MG: 100 CAPSULE, LIQUID FILLED ORAL at 09:45

## 2019-04-05 ASSESSMENT — PAIN SCALES - WONG BAKER: WONGBAKER_NUMERICALRESPONSE: HURTS JUST A LITTLE BIT

## 2019-04-05 ASSESSMENT — PAIN SCALES - PAIN ASSESSMENT IN ADVANCED DEMENTIA (PAINAD)
FACIALEXPRESSION: SMILING OR INEXPRESSIVE
BODYLANGUAGE: RELAXED
TOTALSCORE: 0
BREATHING: NORMAL
CONSOLABILITY: NO NEED TO CONSOLE

## 2019-04-05 ASSESSMENT — COGNITIVE AND FUNCTIONAL STATUS - GENERAL
STANDING UP FROM CHAIR USING ARMS: TOTAL
MOVING FROM LYING ON BACK TO SITTING ON SIDE OF FLAT BED: UNABLE
CLIMB 3 TO 5 STEPS WITH RAILING: TOTAL
SUGGESTED CMS G CODE MODIFIER MOBILITY: CN
TURNING FROM BACK TO SIDE WHILE IN FLAT BAD: UNABLE
MOBILITY SCORE: 6
WALKING IN HOSPITAL ROOM: TOTAL
MOVING TO AND FROM BED TO CHAIR: UNABLE

## 2019-04-05 ASSESSMENT — GAIT ASSESSMENTS: GAIT LEVEL OF ASSIST: UNABLE TO PARTICIPATE

## 2019-04-05 NOTE — PROGRESS NOTES
· 2 RN skin check complete with Aleksey STEWART.  · Skin surrounding dressing over left hip is clean, dry, and intact. Redness present in bilateral ears and blanching. Bruising and slight scabbing noted over bilateral lower extremities and the right upper extremity. Bruising present in the left sacral region.   · The following interventions in place: waffle cushion, q2h turns, assessing for incontinence, barrier cream, pillows in use for support/positioning and to float heels.

## 2019-04-05 NOTE — CARE PLAN
Problem: Nutritional:  Goal: Achieve adequate nutritional intake  Advance diet as feasible and patient will consume ~50% of meals   Outcome: PROGRESSING AS EXPECTED

## 2019-04-05 NOTE — PROGRESS NOTES
Hospital Medicine Daily Progress Note    Date of Service  4/4/2019    Chief Complaint  89 y.o. female admitted 3/25/2019 with falls/syncope and hip fracture    Hospital Course    89 y.o. female who presented 3/25/2019 with past medical history of Parkinson's disease, dementia, osteoporosis, paroxysmal atrial fibrillation, admitted with fall/syncope apparently patient was found on the floor by caretaker. EMS found patient in Afib/RVR. Due to concern for possible sepsis patient had pan CT that showed left-sided neck femoral fracture but no other acute findings, patient received broad-spectrum antibiotics. Admitted to telemetry, ortho consulted for hip fracture.      Interval Problem Update  3/26--Going for hip repair this AM, will need PT/OT/SLP afterwards  3/27: Laying in bed.  Lethargic.  Open her eyes to verbal stimulus but then drift back to sleep quickly.  No acute distress noted.  3/28: More awake today but confused and agitated.  Requiring physical restraints.  Disoriented to time and place.  Inattentive.  3/29: Confused.  Still requiring restraints.  Pulled her Davis catheter but no hematuria noted.  Bladder scan with no retention noted.  Failed swallow evaluation.  POA refusing any tube feeding as per patient wishes.  No acute distress noted.  3/30:   The patient appears calm during my examination but she has poor memory and pulled on lines and impulsive per nursing staff.  She is still on restraints.  Will start risperidone and monitor.  Will consider removal of restraints tomorrow.   Discussed with the patient's POA who is sitting at bedside.   3/31:  The patient appears more calm and cooperative this morning.  The patient's friends are at bedside.  I advised the patient's primary nurse to keep the patient off restraints and monitor the patient's behaviors.  4/1:  The patient has been off restraints for more than 24 hours.  She remains calm in bed.     4/2: Resting comfortably in bed.  Pleasantly demented.   Does not have any restraints.  Follow simple commands.  Mental status at baseline.  No distress noted.  No issues overnight reported per staff.  4/3: Laying in bed comfortably.  Off restraints.  No distress noted.  No issues overnight per staff.  No physical restraints.  4/4: Resting comfortably.  Pleasantly demented.  No distress noted.  No issues overnight.  Consultants/Specialty  Ortho  Palliative  Code Status  DNR/DNI    Disposition  Pending SNF acceptance.    Difficult discharge as there is no discharge planning after patient goes to SNF which makes acceptance challenging.    Review of Systems  Review of Systems   Unable to perform ROS: Dementia    review of systems limited due to the patient's dementia.     Physical Exam  Temp:  [36.1 °C (97 °F)-36.4 °C (97.5 °F)] 36.2 °C (97.2 °F)  Pulse:  [71-95] 86  Resp:  [15-17] 15  BP: ()/(43-62) 115/43  SpO2:  [90 %-93 %] 91 %    Physical Exam   Constitutional: She appears cachectic. No distress.   Frail.     HENT:   Head: Normocephalic and atraumatic.   Eyes: Pupils are equal, round, and reactive to light. Conjunctivae are normal. No scleral icterus.   Neck: Normal range of motion. No thyromegaly present.   Cardiovascular: Normal rate, regular rhythm and normal heart sounds.  Exam reveals no gallop and no friction rub.    Pulmonary/Chest: Effort normal. No respiratory distress.   Abdominal: Soft. She exhibits no distension.   Musculoskeletal: She exhibits no tenderness or deformity.   Diffuse muscle wasting   Multiple old ecchymosis and bruises on upper extremities.    Neurological: She is alert.   Oriented to person. Not to time and place.   Skin: Skin is warm and dry. She is not diaphoretic.   Psychiatric: Cognition and memory are impaired. She expresses impulsivity. She exhibits abnormal recent memory and abnormal remote memory.   Nursing note and vitals reviewed.    Fluids    Intake/Output Summary (Last 24 hours) at 04/04/19 4548  Last data filed at 04/04/19  1400   Gross per 24 hour   Intake               50 ml   Output                0 ml   Net               50 ml     Laboratory  Recent Labs      04/04/19   1155   WBC  6.0   RBC  3.22*   HEMOGLOBIN  10.0*   HEMATOCRIT  30.9*   MCV  96.0   MCH  31.1   MCHC  32.4*   RDW  49.8   PLATELETCT  215   MPV  9.9     Recent Labs      04/04/19   1155   SODIUM  141   POTASSIUM  3.6   CHLORIDE  107   CO2  29   GLUCOSE  128*   BUN  29*   CREATININE  0.60   CALCIUM  8.2*                   Imaging  EC-ECHOCARDIOGRAM COMPLETE W/O CONT   Final Result      DX-PELVIS-1 OR 2 VIEWS   Final Result      Left hip arthroplasty.      CT-CHEST,ABDOMEN,PELVIS WITH   Final Result      1.  Acute left femoral neck fracture is identified.      2.  Fluid-containing structures in the pelvis bilaterally are identified. These could represent urinary bladder diverticula versus ovarian cysts or adnexal cysts.      3.  No solid organ injury identified.      4.  Small renal cysts are noted.      5.  Gallbladder appears distended and may contain a gallstone.      6.  Emphysema and areas of fibrosis are noted in the chest.      7.  Extensive atherosclerosis is noted throughout the aorta and its branch vessels.         CT-HEAD W/O   Final Result         NO ACUTE ABNORMALITIES ARE NOTED ON CT SCAN OF THE HEAD.      Findings are consistent with atrophy.  Decreased attenuation in the periventricular white matter likely indicates microvascular ischemic disease.      DX-FEMUR-2+ LEFT   Final Result      Left femoral neck fracture with displacement      DX-PELVIS-1 OR 2 VIEWS   Final Result      1.  Acute left femoral neck fracture with displacement      2.  Right superior and inferior pubic rami fracture, acuity indeterminate      DX-CHEST-PORTABLE (1 VIEW)   Final Result      1.  Interstitial densities which could be fibrosis or edema      2.  Air lucency projecting over the left lung base which could be a contusion or much less likely etiology such as diaphragmatic  injury. CT recommended for further assessment           Assessment/Plan  Closed fracture of neck of left femur (HCC)- (present on admission)   Assessment & Plan    After a fall, had CT hip showing Acute left femoral neck fracture is identified, Ortho has been consulted  Patient underwent left hemiarthroplasty on 3/26/2019.  PT/OT.  the patient is off restraint for 24 hours.  Discussed discharge planning with social work.   The patient is safe for transition to a  post-acute facility.  Pending bed availability at SNF.       Dysphagia- (present on admission)   Assessment & Plan    Initially failed swallow evaluation.  Per POA, patient does not want any tube feeding.  Speech following.  Now, the patient is on pureed diet.  Risk of aspiration still exist.  POA aware of that risk.  Palliative following. No hospice recommended at this point.      Hypokalemia- (present on admission)   Assessment & Plan    Replaced   Monitor periodically      Moderate protein-calorie malnutrition (HCC)- (present on admission)   Assessment & Plan    poor p.o. intake.  No tube feeding per POA.  Palliative consulted.  The patient is able to tolerate oral diet.     Normocytic anemia- (present on admission)   Assessment & Plan    Monitor H&H. Adequate folate level. B12 level is low normal range.  Give supplement via IM x 1 dose. Continue with oral supplements.     Prediabetes- (present on admission)   Assessment & Plan    Hgb A1c is 5.9  Stable      Paroxysmal atrial fibrillation (HCC)- (present on admission)   Assessment & Plan    Patient was found to be on A. fib with RVR, echocardiogram indicate LV EF of 75% and mild MR and TR.  Continue on low-dose metoprolol, patient most probably is not good candidate for anticoagulation due to risk of fall and age     Late onset Alzheimer's disease without behavioral disturbance- (present on admission)   Assessment & Plan    High risk for hospital-acquired delirium.  Avoid benzodiazepines and/or  anti-cholinergic medications.  Avoid any sedatives or hypnotics.  Minimize lines.  Willfully catheter.  Daily orientation  Keep off restraints.  Continue low dose Risperdal.      Hypercholesterolemia- (present on admission)   Assessment & Plan    Total cholesterol is 150 and LDL is 74.     Plan of care discussed with multidisciplinary team during rounds.    VTE prophylaxis: Heparin

## 2019-04-05 NOTE — DIETARY
Nutrition Services: Update   Day 11 of admit.  Rosalba Wagner is a 89 y.o. female with admitting DX of A-fib, Femur fracture    Pt is currently on regular, dysphagia 1 diet with nectar thick liquids and 1:1 supervision. Pt is receiving Boost [facility] TID with meals. Spoke to RN. RN states pt is eating better today than she has been. Per chart pt PO 25-75% 2 meals documented. RN unsure if pt is drinking Boost, RN states she will encourage Boost. Per chart pt PO varies 0-75%.  Per MD note 4/4: No TF per POA. Estimated kcals/day based of MSJ x 1.2 = 931 kcals. Wt 4/5: 44.7 kg via bed scale.     Malnutrition Risk: No criteria noted at this time.     Recommendations/Plan:  1. Encourage intake of meals  2. Document intake of all meals as % taken in ADL's to provide interdisciplinary communication across all shifts.   3. Monitor weight.  4. Nutrition rep will continue to see patient for ongoing meal and snack preferences.    RD following

## 2019-04-05 NOTE — CARE PLAN
Problem: Safety  Goal: Will remain free from injury  Outcome: PROGRESSING AS EXPECTED  Bed alarm in place. Patient room close to the nursing station. Patient instructed to use call light to alert staff of needs. Hourly rounding in place.    Problem: Skin Integrity  Goal: Risk for impaired skin integrity will decrease  Outcome: PROGRESSING AS EXPECTED  Q2h turns in place. Waffle cushion in place. Pillows in use for support/positioning and to float heels.

## 2019-04-05 NOTE — THERAPY
"Physical Therapy Treatment completed.   Bed Mobility:  Supine to Sit: Maximal Assist  Transfers: Sit to Stand: Maximal Assist  Gait: Level Of Assist: Unable to Participate   Plan of Care: Will benefit from Physical Therapy 4 times per week  Discharge Recommendations: Equipment: Will Continue to Assess for Equipment Needs. Post-acute therapy Recommend inpatient transitional care services for continued physical therapy services.        See \"Rehab Therapy-Acute\" Patient Summary Report for complete documentation.   Pt was pleasant but more sleepy today. Rn reported she was up in chair this am so could be more fatigued. She was able to engage in conversation, but required more cues and extra time to wake up to participate. Pt required maxA for bed mobility and again cues to maintain hip precautions. Pt at edge of bed today required more assist for seated balance due to fatigue. She required max verbal cues for posture and anterior weight shifting to prevent posterior lean. Intermittently she could hold herself up but overall required Christi for trunk control. Performed two sit to stands with maxA and faciliation for posture. Pt will benefit from continued skilled acute therapy while in house to maximize functional mobility.    "

## 2019-04-06 PROCEDURE — A9270 NON-COVERED ITEM OR SERVICE: HCPCS | Performed by: ORTHOPAEDIC SURGERY

## 2019-04-06 PROCEDURE — 700102 HCHG RX REV CODE 250 W/ 637 OVERRIDE(OP): Performed by: INTERNAL MEDICINE

## 2019-04-06 PROCEDURE — 700102 HCHG RX REV CODE 250 W/ 637 OVERRIDE(OP): Performed by: HOSPITALIST

## 2019-04-06 PROCEDURE — 700111 HCHG RX REV CODE 636 W/ 250 OVERRIDE (IP): Performed by: FAMILY MEDICINE

## 2019-04-06 PROCEDURE — 700112 HCHG RX REV CODE 229: Performed by: ORTHOPAEDIC SURGERY

## 2019-04-06 PROCEDURE — A9270 NON-COVERED ITEM OR SERVICE: HCPCS | Performed by: INTERNAL MEDICINE

## 2019-04-06 PROCEDURE — 99231 SBSQ HOSP IP/OBS SF/LOW 25: CPT | Performed by: FAMILY MEDICINE

## 2019-04-06 PROCEDURE — 302146: Performed by: FAMILY MEDICINE

## 2019-04-06 PROCEDURE — 770006 HCHG ROOM/CARE - MED/SURG/GYN SEMI*

## 2019-04-06 PROCEDURE — 700102 HCHG RX REV CODE 250 W/ 637 OVERRIDE(OP): Performed by: ORTHOPAEDIC SURGERY

## 2019-04-06 PROCEDURE — A9270 NON-COVERED ITEM OR SERVICE: HCPCS | Performed by: HOSPITALIST

## 2019-04-06 RX ADMIN — RISPERIDONE 0.5 MG: 0.5 TABLET, FILM COATED ORAL at 17:51

## 2019-04-06 RX ADMIN — HEPARIN SODIUM 5000 UNITS: 5000 INJECTION, SOLUTION INTRAVENOUS; SUBCUTANEOUS at 20:51

## 2019-04-06 RX ADMIN — SENNOSIDES AND DOCUSATE SODIUM 1 TABLET: 8.6; 5 TABLET ORAL at 20:49

## 2019-04-06 RX ADMIN — HEPARIN SODIUM 5000 UNITS: 5000 INJECTION, SOLUTION INTRAVENOUS; SUBCUTANEOUS at 15:17

## 2019-04-06 RX ADMIN — DOCUSATE SODIUM 100 MG: 100 CAPSULE, LIQUID FILLED ORAL at 17:51

## 2019-04-06 RX ADMIN — METOPROLOL TARTRATE 12.5 MG: 25 TABLET, FILM COATED ORAL at 17:50

## 2019-04-06 ASSESSMENT — PAIN SCALES - PAIN ASSESSMENT IN ADVANCED DEMENTIA (PAINAD)
TOTALSCORE: 0
BODYLANGUAGE: RELAXED
CONSOLABILITY: NO NEED TO CONSOLE
TOTALSCORE: 0
BODYLANGUAGE: RELAXED
BREATHING: NORMAL
CONSOLABILITY: NO NEED TO CONSOLE
FACIALEXPRESSION: SMILING OR INEXPRESSIVE
BREATHING: NORMAL
FACIALEXPRESSION: SMILING OR INEXPRESSIVE

## 2019-04-06 NOTE — PROGRESS NOTES
· 2 RN skin check complete with TERRY Reyes.  · Incision site over left hip is clean, dry and intact. Redness noted in bilateral ears, skin is blanching. Bruising noted over bilateral lower and upper extremities. Bruising noted in the left sacral region. Slight scabbing present in bilateral lower extremities.    · The following interventions in place: waffle cushion, q2h turns, assessing for incontinence frequently, barrier cream, pillows for support/posititioning and to float bilateral heels.

## 2019-04-06 NOTE — CARE PLAN
Problem: Safety  Goal: Will remain free from falls    Intervention: Implement fall precautions  Call light and belongings within reach, bed down and locked, hourly rounding in progress. Treaded socks in use, no DME at bedside. Bed alarm in use. Lap belt in use, pt able to release independently.       Problem: Infection  Goal: Will remain free from infection    Intervention: Implement standard precautions and perform hand washing before and after patient contact  Hand washing before and after entering pt room. Gloves in use for pt contact, standard precautions in place.

## 2019-04-06 NOTE — CARE PLAN
Problem: Infection  Goal: Will remain free from infection  Outcome: PROGRESSING AS EXPECTED  Standard precautions implemented. Hand hygiene performed before and after patient contact.     Problem: Skin Integrity  Goal: Risk for impaired skin integrity will decrease  Outcome: PROGRESSING AS EXPECTED  Q2h turns in place. Pillows in use for support/positioning and to float heels. Waffle cushion in place. Frequently assessing for incontinence. Barrier cream used as needed.

## 2019-04-06 NOTE — PROGRESS NOTES
Hospital Medicine Daily Progress Note    Date of Service  4/6/2019    Chief Complaint  89 y.o. female admitted 3/25/2019 with falls/syncope and hip fracture    Hospital Course    89 y.o. female who presented 3/25/2019 with past medical history of Parkinson's disease, dementia, osteoporosis, paroxysmal atrial fibrillation, admitted with fall/syncope apparently patient was found on the floor by caretaker. EMS found patient in Afib/RVR. Due to concern for possible sepsis patient had pan CT that showed left-sided neck femoral fracture but no other acute findings, patient received broad-spectrum antibiotics. Admitted to telemetry, ortho consulted for hip fracture.      Interval Problem Update  3/26--Going for hip repair this AM, will need PT/OT/SLP afterwards  3/27: Laying in bed.  Lethargic.  Open her eyes to verbal stimulus but then drift back to sleep quickly.  No acute distress noted.  3/28: More awake today but confused and agitated.  Requiring physical restraints.  Disoriented to time and place.  Inattentive.  3/29: Confused.  Still requiring restraints.  Pulled her Davis catheter but no hematuria noted.  Bladder scan with no retention noted.  Failed swallow evaluation.  POA refusing any tube feeding as per patient wishes.  No acute distress noted.  3/30:   The patient appears calm during my examination but she has poor memory and pulled on lines and impulsive per nursing staff.  She is still on restraints.  Will start risperidone and monitor.  Will consider removal of restraints tomorrow.   Discussed with the patient's POA who is sitting at bedside.   3/31:  The patient appears more calm and cooperative this morning.  The patient's friends are at bedside.  I advised the patient's primary nurse to keep the patient off restraints and monitor the patient's behaviors.  4/1:  The patient has been off restraints for more than 24 hours.  She remains calm in bed.     4/2: Resting comfortably in bed.  Pleasantly demented.   Does not have any restraints.  Follow simple commands.  Mental status at baseline.  No distress noted.  No issues overnight reported per staff.  4/3: Laying in bed comfortably.  Off restraints.  No distress noted.  No issues overnight per staff.  No physical restraints.  4/4: Resting comfortably.  Pleasantly demented.  No distress noted.  No issues overnight.  4/5: Sitting up in chair comfortably.  Alert and interactive.  Her friend at bedside whom she cannot remember her name.  Pleasantly demented.  No distress noted.  4/6: Resting comfortably in bed.  No distress noted.  No issues overnight or agitation.  Consultants/Specialty  Ortho  Palliative  Code Status  DNR/DNI    Disposition  Pending SNF acceptance.    Difficult discharge as there is no discharge planning after patient goes to SNF which makes acceptance challenging.    Review of Systems  Review of Systems   Unable to perform ROS: Dementia    review of systems limited due to the patient's dementia.     Physical Exam  Temp:  [36.2 °C (97.1 °F)-36.9 °C (98.5 °F)] 36.3 °C (97.4 °F)  Pulse:  [68-80] 68  Resp:  [14-16] 16  BP: (115-163)/(45-71) 163/55  SpO2:  [89 %-95 %] 95 %    Physical Exam   Constitutional: She appears cachectic. No distress.   Frail.     HENT:   Head: Normocephalic and atraumatic.   Eyes: Pupils are equal, round, and reactive to light. Conjunctivae are normal. No scleral icterus.   Neck: Normal range of motion. No tracheal deviation present. No thyromegaly present.   Cardiovascular: Normal rate, regular rhythm and normal heart sounds.  Exam reveals no gallop and no friction rub.    Pulmonary/Chest: Effort normal.   Abdominal: Soft. She exhibits no distension. There is no tenderness.   Musculoskeletal: She exhibits no tenderness or deformity.   Diffuse muscle wasting   Multiple old ecchymosis and bruises on upper extremities.    Neurological: She is alert.   Oriented to person. Not to time and place.   Skin: Skin is warm and dry. She is not  diaphoretic.   Psychiatric: She has a normal mood and affect. Cognition and memory are impaired. She expresses impulsivity. She exhibits abnormal recent memory and abnormal remote memory.   Nursing note and vitals reviewed.    Fluids    Intake/Output Summary (Last 24 hours) at 04/06/19 1623  Last data filed at 04/05/19 2000   Gross per 24 hour   Intake              240 ml   Output                0 ml   Net              240 ml     Laboratory  Recent Labs      04/04/19   1155   WBC  6.0   RBC  3.22*   HEMOGLOBIN  10.0*   HEMATOCRIT  30.9*   MCV  96.0   MCH  31.1   MCHC  32.4*   RDW  49.8   PLATELETCT  215   MPV  9.9     Recent Labs      04/04/19   1155   SODIUM  141   POTASSIUM  3.6   CHLORIDE  107   CO2  29   GLUCOSE  128*   BUN  29*   CREATININE  0.60   CALCIUM  8.2*                   Imaging  EC-ECHOCARDIOGRAM COMPLETE W/O CONT   Final Result      DX-PELVIS-1 OR 2 VIEWS   Final Result      Left hip arthroplasty.      CT-CHEST,ABDOMEN,PELVIS WITH   Final Result      1.  Acute left femoral neck fracture is identified.      2.  Fluid-containing structures in the pelvis bilaterally are identified. These could represent urinary bladder diverticula versus ovarian cysts or adnexal cysts.      3.  No solid organ injury identified.      4.  Small renal cysts are noted.      5.  Gallbladder appears distended and may contain a gallstone.      6.  Emphysema and areas of fibrosis are noted in the chest.      7.  Extensive atherosclerosis is noted throughout the aorta and its branch vessels.         CT-HEAD W/O   Final Result         NO ACUTE ABNORMALITIES ARE NOTED ON CT SCAN OF THE HEAD.      Findings are consistent with atrophy.  Decreased attenuation in the periventricular white matter likely indicates microvascular ischemic disease.      DX-FEMUR-2+ LEFT   Final Result      Left femoral neck fracture with displacement      DX-PELVIS-1 OR 2 VIEWS   Final Result      1.  Acute left femoral neck fracture with displacement       2.  Right superior and inferior pubic rami fracture, acuity indeterminate      DX-CHEST-PORTABLE (1 VIEW)   Final Result      1.  Interstitial densities which could be fibrosis or edema      2.  Air lucency projecting over the left lung base which could be a contusion or much less likely etiology such as diaphragmatic injury. CT recommended for further assessment           Assessment/Plan  * Closed fracture of neck of left femur (HCC)- (present on admission)   Assessment & Plan    After a fall, had CT hip showing Acute left femoral neck fracture is identified, Ortho has been consulted  Patient underwent left hemiarthroplasty on 3/26/2019.  PT/OT.  the patient is off restraint for 24 hours.  Cleared for discharge from medical stand point   Heparin for DVT ppx.   Difficult placement due to lack of discharge planning after acceptance to SNF.        Dysphagia- (present on admission)   Assessment & Plan    Initially failed swallow evaluation.  Per POA, patient does not want any tube feeding.  Speech following.  Now, the patient is on pureed diet.  Risk of aspiration still exist.  POA aware of that risk.  Palliative following. No hospice recommended at this point.      Normocytic anemia- (present on admission)   Assessment & Plan    Monitor H&H. Adequate folate level. B12 level is low normal range.  Give supplement via IM x 1 dose. Continue with oral supplements.     Late onset Alzheimer's disease without behavioral disturbance- (present on admission)   Assessment & Plan    High risk for hospital-acquired delirium.  Avoid benzodiazepines and/or anti-cholinergic medications.  Avoid any sedatives or hypnotics.  Minimize lines.  Willfully catheter.  Daily orientation  Keep off restraints.  Continue low dose Risperdal.      Moderate protein-calorie malnutrition (HCC)- (present on admission)   Assessment & Plan    poor p.o. intake.  No tube feeding per POA.  Palliative consulted.  The patient is able to tolerate oral diet.      Paroxysmal atrial fibrillation (HCC)- (present on admission)   Assessment & Plan    Patient was found to be on A. fib with RVR, echocardiogram indicate LV EF of 75% and mild MR and TR.  Continue on low-dose metoprolol, patient most probably is not good candidate for anticoagulation due to risk of fall and age     Prediabetes- (present on admission)   Assessment & Plan    Hgb A1c is 5.9  Stable      Hypokalemia- (present on admission)   Assessment & Plan    Replaced   Monitor periodically      Hypercholesterolemia- (present on admission)   Assessment & Plan    Total cholesterol is 150 and LDL is 74.     Plan of care discussed with multidisciplinary team during rounds.    VTE prophylaxis: Heparin

## 2019-04-06 NOTE — PROGRESS NOTES
· 2 RN skin check complete with Brendan STEWART.  · Blanching redness to bilateral ears, scattered bruising/scabbing to BLE, surgical incision to left hip. Bruising to left sacrum/inner thigh. Sacrum pink and blanching.    · The following interventions in place: waffle cushion, q2h turns, assessing for incontinence, barrier cream, pillows in use for support/positioning and to float heels.

## 2019-04-06 NOTE — PROGRESS NOTES
"   Orthopaedic PA Progress Note    Interval changes:Resting comfortably in bed.    ROS - Patient denies any new issues. No chest pain, dyspnea, or fever.  Pain well controlled.    /45   Pulse 74   Temp 36.9 °C (98.4 °F) (Temporal)   Resp 16   Ht 1.575 m (5' 2.01\")   Wt 44.7 kg (98 lb 8.7 oz)   SpO2 89%     Patient seen and examined  No acute distress  Breathing non labored  RRR  Surgical dressing is clean, dry, and intact. Patient clearly fires tibialis anterior, EHL, and gastrocnemius/soleus. Sensation is intact to light touch throughout superficial peroneal, deep peroneal, tibial, saphenous, and sural nerve distributions. Strong and palpable 2+ dorsalis pedis and posterior tibial pulses with capillary refill less than 2 seconds. No lower leg tenderness or discomfort.    Recent Labs      04/04/19   1155   WBC  6.0   RBC  3.22*   HEMOGLOBIN  10.0*   HEMATOCRIT  30.9*   MCV  96.0   MCH  31.1   MCHC  32.4*   RDW  49.8   PLATELETCT  215   MPV  9.9       Active Hospital Problems    Diagnosis   • Closed fracture of neck of left femur (HCC) [S72.002A]     Priority: High   • Dysphagia [R13.10]     Priority: Medium   • Normocytic anemia [D64.9]     Priority: Medium   • Late onset Alzheimer's disease without behavioral disturbance [G30.1, F02.80]     Priority: Medium   • Hypokalemia [E87.6]     Priority: Low   • Prediabetes [R73.03]     Priority: Low   • Hypercholesterolemia [E78.00]     Priority: Low   • Moderate protein-calorie malnutrition (HCC) [E44.0]   • Paroxysmal atrial fibrillation (HCC) [I48.0]     Assessment/Plan:  POD#10 S/P L hip ave  Wt bearing status - AT  PT/OT-initiated  Wound care:dressing left in place  Drains - no  Davis-no  Sutures/Staples out- 10-14 days post operatively  Antibiotics: complete  DVT Prophylaxis- TEDS/SCDs/Foot pumps.   UFH 5000u q8h Duration-until ambulatory > 150'  Future Procedures - not anticipated  Case Coordination for Discharge Planning - Disposition SNF, cleared from " Ortho standpoint, may transition to ASA 81 mg PO BID on discharge / transfer in lieu of UFH

## 2019-04-06 NOTE — PROGRESS NOTES
Hospital Medicine Daily Progress Note    Date of Service  4/5/2019    Chief Complaint  89 y.o. female admitted 3/25/2019 with falls/syncope and hip fracture    Hospital Course    89 y.o. female who presented 3/25/2019 with past medical history of Parkinson's disease, dementia, osteoporosis, paroxysmal atrial fibrillation, admitted with fall/syncope apparently patient was found on the floor by caretaker. EMS found patient in Afib/RVR. Due to concern for possible sepsis patient had pan CT that showed left-sided neck femoral fracture but no other acute findings, patient received broad-spectrum antibiotics. Admitted to telemetry, ortho consulted for hip fracture.      Interval Problem Update  3/26--Going for hip repair this AM, will need PT/OT/SLP afterwards  3/27: Laying in bed.  Lethargic.  Open her eyes to verbal stimulus but then drift back to sleep quickly.  No acute distress noted.  3/28: More awake today but confused and agitated.  Requiring physical restraints.  Disoriented to time and place.  Inattentive.  3/29: Confused.  Still requiring restraints.  Pulled her Davis catheter but no hematuria noted.  Bladder scan with no retention noted.  Failed swallow evaluation.  POA refusing any tube feeding as per patient wishes.  No acute distress noted.  3/30:   The patient appears calm during my examination but she has poor memory and pulled on lines and impulsive per nursing staff.  She is still on restraints.  Will start risperidone and monitor.  Will consider removal of restraints tomorrow.   Discussed with the patient's POA who is sitting at bedside.   3/31:  The patient appears more calm and cooperative this morning.  The patient's friends are at bedside.  I advised the patient's primary nurse to keep the patient off restraints and monitor the patient's behaviors.  4/1:  The patient has been off restraints for more than 24 hours.  She remains calm in bed.     4/2: Resting comfortably in bed.  Pleasantly demented.   Does not have any restraints.  Follow simple commands.  Mental status at baseline.  No distress noted.  No issues overnight reported per staff.  4/3: Laying in bed comfortably.  Off restraints.  No distress noted.  No issues overnight per staff.  No physical restraints.  4/4: Resting comfortably.  Pleasantly demented.  No distress noted.  No issues overnight.  4/5: Sitting up in chair comfortably.  Alert and interactive.  Her friend at bedside whom she cannot remember her name.  Pleasantly demented.  No distress noted.  Consultants/Specialty  Ortho  Palliative  Code Status  DNR/DNI    Disposition  Pending SNF acceptance.    Difficult discharge as there is no discharge planning after patient goes to SNF which makes acceptance challenging.    Review of Systems  Review of Systems   Unable to perform ROS: Dementia    review of systems limited due to the patient's dementia.     Physical Exam  Temp:  [36.1 °C (97 °F)-36.9 °C (98.4 °F)] 36.9 °C (98.4 °F)  Pulse:  [74-97] 74  Resp:  [16-17] 16  BP: (109-158)/(45-72) 116/45  SpO2:  [89 %-92 %] 89 %    Physical Exam   Constitutional: She appears cachectic. No distress.   Frail.     HENT:   Head: Normocephalic and atraumatic.   Eyes: Pupils are equal, round, and reactive to light. Conjunctivae are normal. Right eye exhibits no discharge. Left eye exhibits no discharge.   Neck: Normal range of motion. No thyromegaly present.   Cardiovascular: Normal rate, regular rhythm and normal heart sounds.  Exam reveals no gallop.    Pulmonary/Chest: Effort normal.   Abdominal: Soft. She exhibits no distension. There is no tenderness.   Musculoskeletal: She exhibits no tenderness or deformity.   Diffuse muscle wasting   Multiple old ecchymosis and bruises on upper extremities.    Neurological: She is alert.   Oriented to person. Not to time and place.   Skin: Skin is warm.   Psychiatric: She has a normal mood and affect. Cognition and memory are impaired. She expresses impulsivity. She  exhibits abnormal recent memory and abnormal remote memory.   Nursing note and vitals reviewed.    Fluids    Intake/Output Summary (Last 24 hours) at 04/05/19 1850  Last data filed at 04/05/19 1130   Gross per 24 hour   Intake              473 ml   Output                0 ml   Net              473 ml     Laboratory  Recent Labs      04/04/19   1155   WBC  6.0   RBC  3.22*   HEMOGLOBIN  10.0*   HEMATOCRIT  30.9*   MCV  96.0   MCH  31.1   MCHC  32.4*   RDW  49.8   PLATELETCT  215   MPV  9.9     Recent Labs      04/04/19   1155   SODIUM  141   POTASSIUM  3.6   CHLORIDE  107   CO2  29   GLUCOSE  128*   BUN  29*   CREATININE  0.60   CALCIUM  8.2*                   Imaging  EC-ECHOCARDIOGRAM COMPLETE W/O CONT   Final Result      DX-PELVIS-1 OR 2 VIEWS   Final Result      Left hip arthroplasty.      CT-CHEST,ABDOMEN,PELVIS WITH   Final Result      1.  Acute left femoral neck fracture is identified.      2.  Fluid-containing structures in the pelvis bilaterally are identified. These could represent urinary bladder diverticula versus ovarian cysts or adnexal cysts.      3.  No solid organ injury identified.      4.  Small renal cysts are noted.      5.  Gallbladder appears distended and may contain a gallstone.      6.  Emphysema and areas of fibrosis are noted in the chest.      7.  Extensive atherosclerosis is noted throughout the aorta and its branch vessels.         CT-HEAD W/O   Final Result         NO ACUTE ABNORMALITIES ARE NOTED ON CT SCAN OF THE HEAD.      Findings are consistent with atrophy.  Decreased attenuation in the periventricular white matter likely indicates microvascular ischemic disease.      DX-FEMUR-2+ LEFT   Final Result      Left femoral neck fracture with displacement      DX-PELVIS-1 OR 2 VIEWS   Final Result      1.  Acute left femoral neck fracture with displacement      2.  Right superior and inferior pubic rami fracture, acuity indeterminate      DX-CHEST-PORTABLE (1 VIEW)   Final Result       1.  Interstitial densities which could be fibrosis or edema      2.  Air lucency projecting over the left lung base which could be a contusion or much less likely etiology such as diaphragmatic injury. CT recommended for further assessment           Assessment/Plan  * Closed fracture of neck of left femur (HCC)- (present on admission)   Assessment & Plan    After a fall, had CT hip showing Acute left femoral neck fracture is identified, Ortho has been consulted  Patient underwent left hemiarthroplasty on 3/26/2019.  PT/OT.  the patient is off restraint for 24 hours.  Discussed discharge planning with social work.   The patient is safe for transition to a  post-acute facility.  Pending bed availability at SNF.       Dysphagia- (present on admission)   Assessment & Plan    Initially failed swallow evaluation.  Per POA, patient does not want any tube feeding.  Speech following.  Now, the patient is on pureed diet.  Risk of aspiration still exist.  POA aware of that risk.  Palliative following. No hospice recommended at this point.      Normocytic anemia- (present on admission)   Assessment & Plan    Monitor H&H. Adequate folate level. B12 level is low normal range.  Give supplement via IM x 1 dose. Continue with oral supplements.     Late onset Alzheimer's disease without behavioral disturbance- (present on admission)   Assessment & Plan    High risk for hospital-acquired delirium.  Avoid benzodiazepines and/or anti-cholinergic medications.  Avoid any sedatives or hypnotics.  Minimize lines.  Willfully catheter.  Daily orientation  Keep off restraints.  Continue low dose Risperdal.      Moderate protein-calorie malnutrition (HCC)- (present on admission)   Assessment & Plan    poor p.o. intake.  No tube feeding per POA.  Palliative consulted.  The patient is able to tolerate oral diet.     Paroxysmal atrial fibrillation (HCC)- (present on admission)   Assessment & Plan    Patient was found to be on A. fib with RVR,  echocardiogram indicate LV EF of 75% and mild MR and TR.  Continue on low-dose metoprolol, patient most probably is not good candidate for anticoagulation due to risk of fall and age     Prediabetes- (present on admission)   Assessment & Plan    Hgb A1c is 5.9  Stable      Hypokalemia- (present on admission)   Assessment & Plan    Replaced   Monitor periodically      Hypercholesterolemia- (present on admission)   Assessment & Plan    Total cholesterol is 150 and LDL is 74.     Plan of care discussed with multidisciplinary team during rounds.    VTE prophylaxis: Heparin

## 2019-04-07 PROCEDURE — 700102 HCHG RX REV CODE 250 W/ 637 OVERRIDE(OP): Performed by: INTERNAL MEDICINE

## 2019-04-07 PROCEDURE — A9270 NON-COVERED ITEM OR SERVICE: HCPCS | Performed by: INTERNAL MEDICINE

## 2019-04-07 PROCEDURE — 700102 HCHG RX REV CODE 250 W/ 637 OVERRIDE(OP): Performed by: HOSPITALIST

## 2019-04-07 PROCEDURE — A9270 NON-COVERED ITEM OR SERVICE: HCPCS | Performed by: HOSPITALIST

## 2019-04-07 PROCEDURE — 700111 HCHG RX REV CODE 636 W/ 250 OVERRIDE (IP): Performed by: FAMILY MEDICINE

## 2019-04-07 PROCEDURE — 99231 SBSQ HOSP IP/OBS SF/LOW 25: CPT | Performed by: FAMILY MEDICINE

## 2019-04-07 PROCEDURE — 770006 HCHG ROOM/CARE - MED/SURG/GYN SEMI*

## 2019-04-07 RX ORDER — QUETIAPINE FUMARATE 25 MG/1
50 TABLET, FILM COATED ORAL EVERY 6 HOURS PRN
Status: DISCONTINUED | OUTPATIENT
Start: 2019-04-07 | End: 2019-05-01

## 2019-04-07 RX ADMIN — METOPROLOL TARTRATE 12.5 MG: 25 TABLET, FILM COATED ORAL at 05:44

## 2019-04-07 RX ADMIN — RISPERIDONE 0.5 MG: 0.5 TABLET, FILM COATED ORAL at 05:44

## 2019-04-07 RX ADMIN — RISPERIDONE 0.5 MG: 0.5 TABLET, FILM COATED ORAL at 17:37

## 2019-04-07 RX ADMIN — HEPARIN SODIUM 5000 UNITS: 5000 INJECTION, SOLUTION INTRAVENOUS; SUBCUTANEOUS at 14:33

## 2019-04-07 RX ADMIN — HEPARIN SODIUM 5000 UNITS: 5000 INJECTION, SOLUTION INTRAVENOUS; SUBCUTANEOUS at 20:20

## 2019-04-07 RX ADMIN — QUETIAPINE FUMARATE 25 MG: 25 TABLET ORAL at 19:42

## 2019-04-07 RX ADMIN — HEPARIN SODIUM 5000 UNITS: 5000 INJECTION, SOLUTION INTRAVENOUS; SUBCUTANEOUS at 05:46

## 2019-04-07 ASSESSMENT — PAIN SCALES - PAIN ASSESSMENT IN ADVANCED DEMENTIA (PAINAD)
TOTALSCORE: 5
NEGVOCALIZATION: OCCASIONAL MOAN/GROAN, LOW SPEECH, NEGATIVE/DISAPPROVING QUALITY
BODYLANGUAGE: RIGID, FISTS CLENCHED, KNEES UP, PUSHING/PULLING AWAY, STRIKES OUT
BREATHING: NORMAL
FACIALEXPRESSION: SAD, FRIGHTENED, FROWN
NEGVOCALIZATION: OCCASIONAL MOAN/GROAN, LOW SPEECH, NEGATIVE/DISAPPROVING QUALITY
CONSOLABILITY: NO NEED TO CONSOLE
FACIALEXPRESSION: SAD, FRIGHTENED, FROWN
FACIALEXPRESSION: SMILING OR INEXPRESSIVE
CONSOLABILITY: DISTRACTED OR REASSURED BY VOICE/TOUCH
NEGVOCALIZATION: OCCASIONAL MOAN/GROAN, LOW SPEECH, NEGATIVE/DISAPPROVING QUALITY
BODYLANGUAGE: TENSE, DISTRESSED PACING, FIDGETING
BODYLANGUAGE: RIGID, FISTS CLENCHED, KNEES UP, PUSHING/PULLING AWAY, STRIKES OUT
TOTALSCORE: 0
TOTALSCORE: 4
BODYLANGUAGE: RELAXED
BREATHING: NORMAL
CONSOLABILITY: DISTRACTED OR REASSURED BY VOICE/TOUCH
BREATHING: NORMAL
FACIALEXPRESSION: SAD, FRIGHTENED, FROWN
BREATHING: NORMAL
TOTALSCORE: 5
CONSOLABILITY: DISTRACTED OR REASSURED BY VOICE/TOUCH

## 2019-04-07 NOTE — PROGRESS NOTES
"   Orthopaedic PA Progress Note    Interval changes:Resting comfortably, no rest pain.    ROS - Patient denies any new issues. No chest pain, dyspnea, or fever.  Pain well controlled.    /57   Pulse 80   Temp 36.6 °C (97.8 °F) (Temporal)   Resp 15   Ht 1.575 m (5' 2.01\")   Wt 44.7 kg (98 lb 8.7 oz)   SpO2 92%     Patient seen and examined  No acute distress  Breathing non labored  RRR  Surgical dressing is clean, dry, and intact. Patient clearly fires tibialis anterior, EHL, and gastrocnemius/soleus. Sensation is intact to light touch throughout superficial peroneal, deep peroneal, tibial, saphenous, and sural nerve distributions. Strong and palpable 2+ dorsalis pedis and posterior tibial pulses with capillary refill less than 2 seconds. No lower leg tenderness or discomfort.    Recent Labs      04/04/19   1155   WBC  6.0   RBC  3.22*   HEMOGLOBIN  10.0*   HEMATOCRIT  30.9*   MCV  96.0   MCH  31.1   MCHC  32.4*   RDW  49.8   PLATELETCT  215   MPV  9.9     Active Hospital Problems    Diagnosis   • Closed fracture of neck of left femur (HCC) [S72.002A]     Priority: High   • Dysphagia [R13.10]     Priority: Medium   • Normocytic anemia [D64.9]     Priority: Medium   • Late onset Alzheimer's disease without behavioral disturbance [G30.1, F02.80]     Priority: Medium   • Hypokalemia [E87.6]     Priority: Low   • Prediabetes [R73.03]     Priority: Low   • Hypercholesterolemia [E78.00]     Priority: Low   • Moderate protein-calorie malnutrition (HCC) [E44.0]   • Paroxysmal atrial fibrillation (HCC) [I48.0]     Assessment/Plan:  POD#11 S/P L hip ave  Wt bearing status - AT  PT/OT-initiated  Wound care:dressing left in place  Drains - no  Davis-no  Sutures/Staples out- 10-14 days post operatively  Antibiotics: complete  DVT Prophylaxis- TEDS/SCDs/Foot pumps.   UFH 5000u q8h Duration-until ambulatory > 150'  Future Procedures - not anticipated  Case Coordination for Discharge Planning - Disposition SNF, cleared " from Ortho standpoint, may transition to ASA 81 mg PO BID on discharge / transfer in lieu of UFH

## 2019-04-07 NOTE — PROGRESS NOTES
"   Orthopaedic PA Progress Note    Interval changes:Challenging discharge, Pt removes SCDs and dressing at times. RN note read and appreciated.    ROS - Patient denies any new issues. No chest pain, dyspnea, or fever.  Pain well controlled.    /58   Pulse 92   Temp 36.9 °C (98.5 °F) (Temporal)   Resp 17   Ht 1.575 m (5' 2.01\")   Wt 44.7 kg (98 lb 8.7 oz)   SpO2 90%     Patient seen and examined  No acute distress  Breathing non labored  RRR  Surgical dressing is clean, dry, and intact. Patient clearly fires tibialis anterior, EHL, and gastrocnemius/soleus. Sensation is intact to light touch throughout superficial peroneal, deep peroneal, tibial, saphenous, and sural nerve distributions. Strong and palpable 2+ dorsalis pedis and posterior tibial pulses with capillary refill less than 2 seconds. No lower leg tenderness or discomfort.    Active Hospital Problems    Diagnosis   • Closed fracture of neck of left femur (HCC) [S72.002A]     Priority: High   • Dysphagia [R13.10]     Priority: Medium   • Normocytic anemia [D64.9]     Priority: Medium   • Late onset Alzheimer's disease without behavioral disturbance [G30.1, F02.80]     Priority: Medium   • Hypokalemia [E87.6]     Priority: Low   • Prediabetes [R73.03]     Priority: Low   • Hypercholesterolemia [E78.00]     Priority: Low   • Moderate protein-calorie malnutrition (HCC) [E44.0]   • Paroxysmal atrial fibrillation (HCC) [I48.0]     Assessment/Plan:  POD#12 S/P L hip ave  Wt bearing status - AT  PT/OT-initiated  Wound care: Dressing changes by RN: Dry sterile dressing only, QOD and PRN if soaked  Drains - no  Davis-no  Sutures/Staples out- 14 days post operatively or at discharge;  whichever happens first  Antibiotics: complete  DVT Prophylaxis- TEDS/SCDs/Foot pumps.   UFH 5000u q8h Duration-until ambulatory > 150'  Future Procedures - not anticipated  Case Coordination for Discharge Planning - Disposition SNF, cleared from Ortho standpoint, may " transition to ASA 81 mg PO BID on discharge / transfer in lieu of UFH

## 2019-04-07 NOTE — CARE PLAN
Problem: Knowledge Deficit  Goal: Knowledge of disease process/condition, treatment plan, diagnostic tests, and medications will improve  Outcome: PROGRESSING SLOWER THAN EXPECTED  Reviewed POC including safety, mobility, pain management, medication administration, DVT prophylaxis, and discharge. No learning evidence at this time. Reinforcement needed. Pt continues to remove SCDs despite DVT education.     Problem: Discharge Barriers/Planning  Goal: Patient's continuum of care needs will be met  Outcome: PROGRESSING SLOWER THAN EXPECTED  Case management on board. Pt declined by all SNFs at this time due to insurance, behavior, and no discharge plan from SNF.

## 2019-04-07 NOTE — CARE PLAN
Problem: Safety  Goal: Will remain free from falls    Intervention: Assess risk factors for falls  Bed locked and in lowest position. Bed and chair alarm on       Problem: Mobility  Goal: Risk for activity intolerance will decrease    Intervention: Assess and monitor signs of activity intolerance  Pt educated to use call light to ambulate. Pt is a 2 assist with hand held assist. Pt able to ambulate to and from bathroom

## 2019-04-08 LAB
ALBUMIN SERPL BCP-MCNC: 3 G/DL (ref 3.2–4.9)
ALBUMIN/GLOB SERPL: 1.3 G/DL
ALP SERPL-CCNC: 95 U/L (ref 30–99)
ALT SERPL-CCNC: 31 U/L (ref 2–50)
ANION GAP SERPL CALC-SCNC: 4 MMOL/L (ref 0–11.9)
AST SERPL-CCNC: 19 U/L (ref 12–45)
BASOPHILS # BLD AUTO: 0.2 % (ref 0–1.8)
BASOPHILS # BLD: 0.02 K/UL (ref 0–0.12)
BILIRUB SERPL-MCNC: 0.6 MG/DL (ref 0.1–1.5)
BUN SERPL-MCNC: 30 MG/DL (ref 8–22)
CALCIUM SERPL-MCNC: 8.8 MG/DL (ref 8.5–10.5)
CHLORIDE SERPL-SCNC: 109 MMOL/L (ref 96–112)
CO2 SERPL-SCNC: 28 MMOL/L (ref 20–33)
CREAT SERPL-MCNC: 0.61 MG/DL (ref 0.5–1.4)
EOSINOPHIL # BLD AUTO: 0.13 K/UL (ref 0–0.51)
EOSINOPHIL NFR BLD: 1.6 % (ref 0–6.9)
ERYTHROCYTE [DISTWIDTH] IN BLOOD BY AUTOMATED COUNT: 54.2 FL (ref 35.9–50)
GLOBULIN SER CALC-MCNC: 2.4 G/DL (ref 1.9–3.5)
GLUCOSE SERPL-MCNC: 100 MG/DL (ref 65–99)
HCT VFR BLD AUTO: 34.7 % (ref 37–47)
HGB BLD-MCNC: 11.1 G/DL (ref 12–16)
IMM GRANULOCYTES # BLD AUTO: 0.05 K/UL (ref 0–0.11)
IMM GRANULOCYTES NFR BLD AUTO: 0.6 % (ref 0–0.9)
LYMPHOCYTES # BLD AUTO: 1.01 K/UL (ref 1–4.8)
LYMPHOCYTES NFR BLD: 12.4 % (ref 22–41)
MCH RBC QN AUTO: 31 PG (ref 27–33)
MCHC RBC AUTO-ENTMCNC: 32 G/DL (ref 33.6–35)
MCV RBC AUTO: 96.9 FL (ref 81.4–97.8)
MONOCYTES # BLD AUTO: 0.85 K/UL (ref 0–0.85)
MONOCYTES NFR BLD AUTO: 10.5 % (ref 0–13.4)
NEUTROPHILS # BLD AUTO: 6.07 K/UL (ref 2–7.15)
NEUTROPHILS NFR BLD: 74.7 % (ref 44–72)
NRBC # BLD AUTO: 0 K/UL
NRBC BLD-RTO: 0 /100 WBC
PLATELET # BLD AUTO: 251 K/UL (ref 164–446)
PMV BLD AUTO: 9.9 FL (ref 9–12.9)
POTASSIUM SERPL-SCNC: 4 MMOL/L (ref 3.6–5.5)
PROT SERPL-MCNC: 5.4 G/DL (ref 6–8.2)
RBC # BLD AUTO: 3.58 M/UL (ref 4.2–5.4)
SODIUM SERPL-SCNC: 141 MMOL/L (ref 135–145)
WBC # BLD AUTO: 8.1 K/UL (ref 4.8–10.8)

## 2019-04-08 PROCEDURE — 85025 COMPLETE CBC W/AUTO DIFF WBC: CPT

## 2019-04-08 PROCEDURE — A9270 NON-COVERED ITEM OR SERVICE: HCPCS | Performed by: ORTHOPAEDIC SURGERY

## 2019-04-08 PROCEDURE — 700112 HCHG RX REV CODE 229: Performed by: ORTHOPAEDIC SURGERY

## 2019-04-08 PROCEDURE — 700102 HCHG RX REV CODE 250 W/ 637 OVERRIDE(OP): Performed by: HOSPITALIST

## 2019-04-08 PROCEDURE — 700102 HCHG RX REV CODE 250 W/ 637 OVERRIDE(OP): Performed by: ORTHOPAEDIC SURGERY

## 2019-04-08 PROCEDURE — 99231 SBSQ HOSP IP/OBS SF/LOW 25: CPT | Performed by: FAMILY MEDICINE

## 2019-04-08 PROCEDURE — A9270 NON-COVERED ITEM OR SERVICE: HCPCS | Performed by: INTERNAL MEDICINE

## 2019-04-08 PROCEDURE — 700102 HCHG RX REV CODE 250 W/ 637 OVERRIDE(OP): Performed by: INTERNAL MEDICINE

## 2019-04-08 PROCEDURE — 700111 HCHG RX REV CODE 636 W/ 250 OVERRIDE (IP): Performed by: FAMILY MEDICINE

## 2019-04-08 PROCEDURE — A9270 NON-COVERED ITEM OR SERVICE: HCPCS | Performed by: HOSPITALIST

## 2019-04-08 PROCEDURE — 80053 COMPREHEN METABOLIC PANEL: CPT

## 2019-04-08 PROCEDURE — 36415 COLL VENOUS BLD VENIPUNCTURE: CPT

## 2019-04-08 PROCEDURE — 770006 HCHG ROOM/CARE - MED/SURG/GYN SEMI*

## 2019-04-08 RX ADMIN — RISPERIDONE 0.5 MG: 0.5 TABLET, FILM COATED ORAL at 17:23

## 2019-04-08 RX ADMIN — METOPROLOL TARTRATE 12.5 MG: 25 TABLET, FILM COATED ORAL at 06:16

## 2019-04-08 RX ADMIN — HEPARIN SODIUM 5000 UNITS: 5000 INJECTION, SOLUTION INTRAVENOUS; SUBCUTANEOUS at 06:16

## 2019-04-08 RX ADMIN — METOPROLOL TARTRATE 12.5 MG: 25 TABLET, FILM COATED ORAL at 17:23

## 2019-04-08 RX ADMIN — HEPARIN SODIUM 5000 UNITS: 5000 INJECTION, SOLUTION INTRAVENOUS; SUBCUTANEOUS at 22:24

## 2019-04-08 RX ADMIN — QUETIAPINE FUMARATE 50 MG: 25 TABLET ORAL at 02:55

## 2019-04-08 RX ADMIN — SENNOSIDES AND DOCUSATE SODIUM 1 TABLET: 8.6; 5 TABLET ORAL at 22:25

## 2019-04-08 RX ADMIN — RISPERIDONE 0.5 MG: 0.5 TABLET, FILM COATED ORAL at 06:16

## 2019-04-08 RX ADMIN — HEPARIN SODIUM 5000 UNITS: 5000 INJECTION, SOLUTION INTRAVENOUS; SUBCUTANEOUS at 13:19

## 2019-04-08 RX ADMIN — DOCUSATE SODIUM 100 MG: 100 CAPSULE, LIQUID FILLED ORAL at 17:29

## 2019-04-08 ASSESSMENT — PATIENT HEALTH QUESTIONNAIRE - PHQ9
1. LITTLE INTEREST OR PLEASURE IN DOING THINGS: NOT AT ALL
SUM OF ALL RESPONSES TO PHQ9 QUESTIONS 1 AND 2: 0
2. FEELING DOWN, DEPRESSED, IRRITABLE, OR HOPELESS: NOT AT ALL

## 2019-04-08 NOTE — PROGRESS NOTES
Pt is impulsive and attempted to get out of bed multiple times.  Pt has disengaged lap belt.  MD notified.  Received new orders for increased in seroquel dose to 50 mg q 6 hrs prn.

## 2019-04-08 NOTE — PALLIATIVE CARE
Palliative Care follow-up  Follow up message left with JORGE to determine where the plan is at so this RN can f/u with POA. All SNFs declined previously; pt now out of restraints. Will follow up with POA after receiving updates from JORGE.    Plan: f/u today    Thank you for allowing Palliative Care to participate in this patient's care. Please feel free to call [phone] with any questions or concerns.

## 2019-04-08 NOTE — CARE PLAN
Problem: Safety  Goal: Will remain free from injury  Outcome: PROGRESSING AS EXPECTED  Restraints order via lap belt, applied lap belt per order.  Goal: Will remain free from falls  Outcome: PROGRESSING AS EXPECTED  Treaded socks in place, bed in the lowest position, bed alarm on, call light and belongings within reach, pt call for assistance appropriately.

## 2019-04-08 NOTE — PROGRESS NOTES
"   Orthopaedic PA Progress Note    Interval changes: Lethargic, sedated overnight    ROS - Patient denies any new issues. No chest pain, dyspnea, or fever.  Pain well controlled.    BP (!) 97/58   Pulse 67   Temp 36.6 °C (97.9 °F) (Temporal)   Resp 16   Ht 1.575 m (5' 2.01\")   Wt 44.7 kg (98 lb 8.7 oz)   SpO2 97%     Patient seen and examined  No acute distress  Breathing non labored  RRR  Surgical dressing is clean, dry, and intact. Patient clearly fires tibialis anterior, EHL, and gastrocnemius/soleus. Sensation is intact to light touch throughout superficial peroneal, deep peroneal, tibial, saphenous, and sural nerve distributions. Strong and palpable 2+ dorsalis pedis and posterior tibial pulses with capillary refill less than 2 seconds. No lower leg tenderness or discomfort.    Recent Labs      04/08/19   0552   WBC  8.1   RBC  3.58*   HEMOGLOBIN  11.1*   HEMATOCRIT  34.7*   MCV  96.9   MCH  31.0   MCHC  32.0*   RDW  54.2*   PLATELETCT  251   MPV  9.9     Active Hospital Problems    Diagnosis   • Dysphagia [R13.10]     Priority: High   • Closed fracture of neck of left femur (HCC) [S72.002A]     Priority: High   • Late onset Alzheimer's disease without behavioral disturbance [G30.1, F02.80]     Priority: High   • Normocytic anemia [D64.9]     Priority: Low   • Hypokalemia [E87.6]     Priority: Low   • Prediabetes [R73.03]     Priority: Low   • Hypercholesterolemia [E78.00]     Priority: Low   • Moderate protein-calorie malnutrition (HCC) [E44.0]   • Paroxysmal atrial fibrillation (HCC) [I48.0]     Assessment/Plan:  POD#13 S/P L hip ave  Wt bearing status - AT  PT/OT-initiated  Wound care: Dressing changes by RN: Dry sterile dressing only, QOD and PRN if soaked  Drains - no  Davis-no  Sutures/Staples out- 14 days post operatively or at discharge;  whichever happens first  Antibiotics: complete  DVT Prophylaxis- TEDS/SCDs/Foot pumps.   UFH 5000u q8h Duration-until ambulatory > 150'  Future Procedures - not " anticipated  Case Coordination for Discharge Planning - Disposition SNF, cleared from Ortho standpoint, may transition to ASA 81 mg PO BID on discharge / transfer in lieu of UFH

## 2019-04-08 NOTE — PROGRESS NOTES
Pt is impulsive and high fall risk.    Notified MD for need for lap belt, Lap belt applied and pt is becoming more agitated.  Will provide PRN medication for agitation.

## 2019-04-08 NOTE — PROGRESS NOTES
Hospital Medicine Daily Progress Note    Date of Service  4/7/2019    Chief Complaint  89 y.o. female admitted 3/25/2019 with falls/syncope and hip fracture    Hospital Course    89 y.o. female who presented 3/25/2019 with past medical history of Parkinson's disease, dementia, osteoporosis, paroxysmal atrial fibrillation, admitted with fall/syncope apparently patient was found on the floor by caretaker. EMS found patient in Afib/RVR. Due to concern for possible sepsis patient had pan CT that showed left-sided neck femoral fracture but no other acute findings, patient received broad-spectrum antibiotics. Admitted to telemetry, ortho consulted for hip fracture.      Interval Problem Update  3/26--Going for hip repair this AM, will need PT/OT/SLP afterwards  3/27: Laying in bed.  Lethargic.  Open her eyes to verbal stimulus but then drift back to sleep quickly.  No acute distress noted.  3/28: More awake today but confused and agitated.  Requiring physical restraints.  Disoriented to time and place.  Inattentive.  3/29: Confused.  Still requiring restraints.  Pulled her Davis catheter but no hematuria noted.  Bladder scan with no retention noted.  Failed swallow evaluation.  POA refusing any tube feeding as per patient wishes.  No acute distress noted.  3/30:   The patient appears calm during my examination but she has poor memory and pulled on lines and impulsive per nursing staff.  She is still on restraints.  Will start risperidone and monitor.  Will consider removal of restraints tomorrow.   Discussed with the patient's POA who is sitting at bedside.   3/31:  The patient appears more calm and cooperative this morning.  The patient's friends are at bedside.  I advised the patient's primary nurse to keep the patient off restraints and monitor the patient's behaviors.  4/1:  The patient has been off restraints for more than 24 hours.  She remains calm in bed.     4/2: Resting comfortably in bed.  Pleasantly demented.   Does not have any restraints.  Follow simple commands.  Mental status at baseline.  No distress noted.  No issues overnight reported per staff.  4/3: Laying in bed comfortably.  Off restraints.  No distress noted.  No issues overnight per staff.  No physical restraints.  4/4: Resting comfortably.  Pleasantly demented.  No distress noted.  No issues overnight.  4/5: Sitting up in chair comfortably.  Alert and interactive.  Her friend at bedside whom she cannot remember her name.  Pleasantly demented.  No distress noted.  4/6: Resting comfortably in bed.  No distress noted.  No issues overnight or agitation.  4/7: Sitting up in chair comfortably.  Alert and interactive.  Pleasantly demented.  No distress noted.  Did not require any physical restraints over the last 24 hours.  No issues overnight per staff.  Consultants/Specialty  Ortho  Palliative  Code Status  DNR/DNI    Disposition  Pending SNF acceptance.    Difficult discharge as there is no discharge planning after patient goes to SNF which makes acceptance challenging.    Review of Systems  Review of Systems   Unable to perform ROS: Dementia    review of systems limited due to the patient's dementia.     Physical Exam  Temp:  [36.6 °C (97.8 °F)-36.9 °C (98.5 °F)] 36.8 °C (98.3 °F)  Pulse:  [78-92] 85  Resp:  [15-18] 17  BP: (102-149)/(44-63) 102/44  SpO2:  [89 %-92 %] 91 %    Physical Exam   Constitutional: She appears cachectic. No distress.   Frail.     HENT:   Head: Normocephalic and atraumatic.   Eyes: Pupils are equal, round, and reactive to light. Conjunctivae are normal. Right eye exhibits no discharge. Left eye exhibits no discharge.   Neck: Normal range of motion. No tracheal deviation present. No thyromegaly present.   Cardiovascular: Normal rate, regular rhythm and normal heart sounds.  Exam reveals no gallop and no friction rub.    Pulmonary/Chest: Effort normal.   Abdominal: Soft. She exhibits no distension.   Musculoskeletal: She exhibits no  tenderness or deformity.   Diffuse muscle wasting   Multiple old ecchymosis and bruises on upper extremities.    Neurological: She is alert.   Oriented to person. Not to time and place.   Skin: Skin is warm and dry. She is not diaphoretic.   Psychiatric: She has a normal mood and affect. Cognition and memory are impaired. She expresses impulsivity. She exhibits abnormal recent memory and abnormal remote memory.   Nursing note and vitals reviewed.    Fluids  No intake or output data in the 24 hours ending 04/07/19 1019  Laboratory                        Imaging  EC-ECHOCARDIOGRAM COMPLETE W/O CONT   Final Result      DX-PELVIS-1 OR 2 VIEWS   Final Result      Left hip arthroplasty.      CT-CHEST,ABDOMEN,PELVIS WITH   Final Result      1.  Acute left femoral neck fracture is identified.      2.  Fluid-containing structures in the pelvis bilaterally are identified. These could represent urinary bladder diverticula versus ovarian cysts or adnexal cysts.      3.  No solid organ injury identified.      4.  Small renal cysts are noted.      5.  Gallbladder appears distended and may contain a gallstone.      6.  Emphysema and areas of fibrosis are noted in the chest.      7.  Extensive atherosclerosis is noted throughout the aorta and its branch vessels.         CT-HEAD W/O   Final Result         NO ACUTE ABNORMALITIES ARE NOTED ON CT SCAN OF THE HEAD.      Findings are consistent with atrophy.  Decreased attenuation in the periventricular white matter likely indicates microvascular ischemic disease.      DX-FEMUR-2+ LEFT   Final Result      Left femoral neck fracture with displacement      DX-PELVIS-1 OR 2 VIEWS   Final Result      1.  Acute left femoral neck fracture with displacement      2.  Right superior and inferior pubic rami fracture, acuity indeterminate      DX-CHEST-PORTABLE (1 VIEW)   Final Result      1.  Interstitial densities which could be fibrosis or edema      2.  Air lucency projecting over the left lung  base which could be a contusion or much less likely etiology such as diaphragmatic injury. CT recommended for further assessment           Assessment/Plan  * Closed fracture of neck of left femur (HCC)- (present on admission)   Assessment & Plan    After a fall, had CT hip showing Acute left femoral neck fracture is identified, Ortho has been consulted  Patient underwent left hemiarthroplasty on 3/26/2019.  PT/OT.  the patient is off restraint for 24 hours.  Cleared for discharge from medical stand point   Heparin for DVT ppx.   Difficult placement due to lack of discharge planning after acceptance to SNF.        Dysphagia- (present on admission)   Assessment & Plan    Initially failed swallow evaluation.  Per POA, patient does not want any tube feeding.  Speech following.  Now, the patient is on pureed diet.  Risk of aspiration still exist.  POA aware of that risk.  Palliative following. No hospice recommended at this point.      Late onset Alzheimer's disease without behavioral disturbance- (present on admission)   Assessment & Plan    High risk for hospital-acquired delirium.  Avoid benzodiazepines and/or anti-cholinergic medications.  Avoid any sedatives or hypnotics.  Minimize lines.  Willfully catheter.  Daily orientation  Keep off restraints.  Continue low dose Risperdal.      Moderate protein-calorie malnutrition (HCC)- (present on admission)   Assessment & Plan    poor p.o. intake.  No tube feeding per POA.  Palliative consulted.  The patient is able to tolerate oral diet.     Paroxysmal atrial fibrillation (HCC)- (present on admission)   Assessment & Plan    Patient was found to be on A. fib with RVR, echocardiogram indicate LV EF of 75% and mild MR and TR.  Continue on low-dose metoprolol, patient most probably is not good candidate for anticoagulation due to risk of fall and age     Normocytic anemia- (present on admission)   Assessment & Plan    Monitor H&H. Adequate folate level. B12 level is low  normal range.  Give supplement via IM x 1 dose. Continue with oral supplements.     Prediabetes- (present on admission)   Assessment & Plan    Hgb A1c is 5.9  Stable      Hypokalemia- (present on admission)   Assessment & Plan    Replaced   Monitor periodically      Hypercholesterolemia- (present on admission)   Assessment & Plan    Total cholesterol is 150 and LDL is 74.     Plan of care discussed with multidisciplinary team during rounds.    VTE prophylaxis: Heparin

## 2019-04-09 PROCEDURE — A9270 NON-COVERED ITEM OR SERVICE: HCPCS | Performed by: ORTHOPAEDIC SURGERY

## 2019-04-09 PROCEDURE — 97530 THERAPEUTIC ACTIVITIES: CPT

## 2019-04-09 PROCEDURE — 700102 HCHG RX REV CODE 250 W/ 637 OVERRIDE(OP): Performed by: INTERNAL MEDICINE

## 2019-04-09 PROCEDURE — 700102 HCHG RX REV CODE 250 W/ 637 OVERRIDE(OP): Performed by: HOSPITALIST

## 2019-04-09 PROCEDURE — A9270 NON-COVERED ITEM OR SERVICE: HCPCS | Performed by: INTERNAL MEDICINE

## 2019-04-09 PROCEDURE — A9270 NON-COVERED ITEM OR SERVICE: HCPCS | Performed by: HOSPITALIST

## 2019-04-09 PROCEDURE — 700112 HCHG RX REV CODE 229: Performed by: ORTHOPAEDIC SURGERY

## 2019-04-09 PROCEDURE — 700102 HCHG RX REV CODE 250 W/ 637 OVERRIDE(OP): Performed by: ORTHOPAEDIC SURGERY

## 2019-04-09 PROCEDURE — 99232 SBSQ HOSP IP/OBS MODERATE 35: CPT | Performed by: INTERNAL MEDICINE

## 2019-04-09 PROCEDURE — 92526 ORAL FUNCTION THERAPY: CPT

## 2019-04-09 PROCEDURE — 700111 HCHG RX REV CODE 636 W/ 250 OVERRIDE (IP): Performed by: FAMILY MEDICINE

## 2019-04-09 PROCEDURE — 770006 HCHG ROOM/CARE - MED/SURG/GYN SEMI*

## 2019-04-09 RX ADMIN — RISPERIDONE 0.5 MG: 0.5 TABLET, FILM COATED ORAL at 05:09

## 2019-04-09 RX ADMIN — METOPROLOL TARTRATE 12.5 MG: 25 TABLET, FILM COATED ORAL at 17:35

## 2019-04-09 RX ADMIN — DOCUSATE SODIUM 100 MG: 100 CAPSULE, LIQUID FILLED ORAL at 05:09

## 2019-04-09 RX ADMIN — HEPARIN SODIUM 5000 UNITS: 5000 INJECTION, SOLUTION INTRAVENOUS; SUBCUTANEOUS at 05:09

## 2019-04-09 RX ADMIN — HEPARIN SODIUM 5000 UNITS: 5000 INJECTION, SOLUTION INTRAVENOUS; SUBCUTANEOUS at 22:24

## 2019-04-09 RX ADMIN — RISPERIDONE 0.5 MG: 0.5 TABLET, FILM COATED ORAL at 17:35

## 2019-04-09 RX ADMIN — METOPROLOL TARTRATE 12.5 MG: 25 TABLET, FILM COATED ORAL at 05:09

## 2019-04-09 RX ADMIN — HEPARIN SODIUM 5000 UNITS: 5000 INJECTION, SOLUTION INTRAVENOUS; SUBCUTANEOUS at 14:38

## 2019-04-09 RX ADMIN — SENNOSIDES AND DOCUSATE SODIUM 1 TABLET: 8.6; 5 TABLET ORAL at 22:24

## 2019-04-09 RX ADMIN — DOCUSATE SODIUM 100 MG: 100 CAPSULE, LIQUID FILLED ORAL at 18:00

## 2019-04-09 ASSESSMENT — GAIT ASSESSMENTS
DISTANCE (FEET): 3
GAIT LEVEL OF ASSIST: MINIMAL ASSIST
ASSISTIVE DEVICE: FRONT WHEEL WALKER

## 2019-04-09 ASSESSMENT — COGNITIVE AND FUNCTIONAL STATUS - GENERAL
MOVING FROM LYING ON BACK TO SITTING ON SIDE OF FLAT BED: UNABLE
CLIMB 3 TO 5 STEPS WITH RAILING: A LOT
PERSONAL GROOMING: A LITTLE
SUGGESTED CMS G CODE MODIFIER MOBILITY: CL
STANDING UP FROM CHAIR USING ARMS: A LITTLE
MOBILITY SCORE: 10
TOILETING: A LOT
WALKING IN HOSPITAL ROOM: A LOT
DAILY ACTIVITIY SCORE: 16
SUGGESTED CMS G CODE MODIFIER DAILY ACTIVITY: CK
DRESSING REGULAR UPPER BODY CLOTHING: A LITTLE
DRESSING REGULAR LOWER BODY CLOTHING: A LOT
MOVING TO AND FROM BED TO CHAIR: UNABLE
TURNING FROM BACK TO SIDE WHILE IN FLAT BAD: UNABLE
HELP NEEDED FOR BATHING: A LOT

## 2019-04-09 NOTE — THERAPY
"Speech Language Therapy dysphagia treatment completed.   Functional Status:  Pt seen on this date for dysphagia treatment. Upon entry into room pt crying with back pain and stating \"I just want to die!\" RN notified and pt put back into bed with no further c/o pain. PO trials of 4 oz of NTL via teaspoon and cup sip and bite of puree x1 presented at intermittent wet voice noted, however, no other overt s/sx of aspiration noted. Pt self-feeding with cup sips and took small sips independently. Pt refusing further PO trials despite encouragment. RN, CNA, PA reporting meal times have been going well and no coughing appreciated. Pt completed 15 reps of laryngeal elevation and 15 reps of lingual ROM exercises with \"fair\" accuracy and max clinician support. Pt requiring max verbal and tactile cues to maintain alertness at end of session so further exercises discontinued 2/2 lethargy. At this time, would recommend continuation of dysphagia I/NTL despite risk and encourage pt in chair during meals as tolerated. SLP following for dysphagia therapy.    Recommendations: continuation of dysphagia I/NTL despite risk, encourage pt to be in chair for meal times as tolerated   Plan of Care: Will benefit from Speech Therapy 3 times per week  Post-Acute Therapy: Recommend inpatient transitional care services for continued speech therapy services.        See \"Rehab Therapy-Acute\" Patient Summary Report for complete documentation.     "

## 2019-04-09 NOTE — CARE PLAN
Problem: Safety  Goal: Will remain free from falls    Intervention: Implement fall precautions  Discussed safety and fall risk. Safety and fall precautions in place, bed/chair alarm in use, call light within reach, bed locked in lowest position, non-skid socks in place, clutter-free environment, and hourly rounding

## 2019-04-09 NOTE — CARE PLAN
Problem: Nutritional:  Goal: Achieve adequate nutritional intake  Advance diet as feasible and patient will consume ~50% of meals   Outcome: PROGRESSING AS EXPECTED  Per chart, pt with varied intake ranging from 0%-75% of meals however has been consumed % of Boost Plus ~3x/day the past 2 days.  Boost Plus 3x/day likely meeting pt's needs however RD will continue to monitor for consistent intake of meals and supplements to ensure pt is meeting needs.

## 2019-04-09 NOTE — PALLIATIVE CARE
Palliative Care follow-up  Call returned from Jossue and updates provided noting why SNF has not been a possibility given she has no DC plan beyond there. He agrees that even if someone were checking in on her, going home would not be safe. PC RN explored other options, in-home help from friends, or other friends she could live with and he has no other options. PC discussed Medicaid screening for long term placement and he's open to that. He states that he believes Rosalba's income is between $1670-3730 but not sure and she has no money in savings. PC RN answered all of Jossue's questions and plan made for PFA screening.     Updated: JORGE (email)    Plan: PFA screening; email sent with CC to JORGE Johnson    Thank you for allowing Palliative Care to participate in this patient's care. Please feel free to call [phone] with any questions or concerns.

## 2019-04-09 NOTE — CARE PLAN
Problem: Skin Integrity  Goal: Risk for impaired skin integrity will decrease    Intervention: Assess risk factors for impaired skin integrity and/or pressure ulcers  No pressure ulcers noted. Pt appears to have bruises along inner thighs bilaterally       Problem: Pain Management  Goal: Pain level will decrease to patient's comfort goal    Intervention: Follow pain managment plan developed in collaboration with patient and Interdisciplinary Team  Pt states no pain at the moment. Pt is sleeping comfortably for most of the day

## 2019-04-09 NOTE — PROGRESS NOTES
Hospital Medicine Daily Progress Note    Date of Service  4/8/2019    Chief Complaint  89 y.o. female admitted 3/25/2019 with falls/syncope and hip fracture    Hospital Course    89 y.o. female who presented 3/25/2019 with past medical history of Parkinson's disease, dementia, osteoporosis, paroxysmal atrial fibrillation, admitted with fall/syncope apparently patient was found on the floor by caretaker. EMS found patient in Afib/RVR. Due to concern for possible sepsis patient had pan CT that showed left-sided neck femoral fracture but no other acute findings, patient received broad-spectrum antibiotics. Admitted to telemetry, ortho consulted for hip fracture.      Interval Problem Update  3/26--Going for hip repair this AM, will need PT/OT/SLP afterwards  3/27: Laying in bed.  Lethargic.  Open her eyes to verbal stimulus but then drift back to sleep quickly.  No acute distress noted.  3/28: More awake today but confused and agitated.  Requiring physical restraints.  Disoriented to time and place.  Inattentive.  3/29: Confused.  Still requiring restraints.  Pulled her Davis catheter but no hematuria noted.  Bladder scan with no retention noted.  Failed swallow evaluation.  POA refusing any tube feeding as per patient wishes.  No acute distress noted.  3/30:   The patient appears calm during my examination but she has poor memory and pulled on lines and impulsive per nursing staff.  She is still on restraints.  Will start risperidone and monitor.  Will consider removal of restraints tomorrow.   Discussed with the patient's POA who is sitting at bedside.   3/31:  The patient appears more calm and cooperative this morning.  The patient's friends are at bedside.  I advised the patient's primary nurse to keep the patient off restraints and monitor the patient's behaviors.  4/1:  The patient has been off restraints for more than 24 hours.  She remains calm in bed.     4/2: Resting comfortably in bed.  Pleasantly demented.   Does not have any restraints.  Follow simple commands.  Mental status at baseline.  No distress noted.  No issues overnight reported per staff.  4/3: Laying in bed comfortably.  Off restraints.  No distress noted.  No issues overnight per staff.  No physical restraints.  4/4: Resting comfortably.  Pleasantly demented.  No distress noted.  No issues overnight.  4/5: Sitting up in chair comfortably.  Alert and interactive.  Her friend at bedside whom she cannot remember her name.  Pleasantly demented.  No distress noted.  4/6: Resting comfortably in bed.  No distress noted.  No issues overnight or agitation.  4/7: Sitting up in chair comfortably.  Alert and interactive.  Pleasantly demented.  No distress noted.  Did not require any physical restraints over the last 24 hours.  No issues overnight per staff.  4/8: Resting in bed comfortably.  No distress noted.  Pleasantly demented    Consultants/Specialty  Ortho  Palliative  Code Status  DNR/DNI    Disposition  Pending SNF acceptance.    Difficult discharge as there is no discharge planning after patient goes to SNF which makes acceptance challenging.    Review of Systems  Review of Systems   Unable to perform ROS: Dementia    review of systems limited due to the patient's dementia.     Physical Exam  Temp:  [36.6 °C (97.9 °F)-37.4 °C (99.3 °F)] 36.8 °C (98.3 °F)  Pulse:  [67-99] 84  Resp:  [16-17] 17  BP: ()/(58-76) 114/76  SpO2:  [81 %-97 %] 95 %    Physical Exam   Constitutional: She appears cachectic.   Frail.     HENT:   Head: Normocephalic and atraumatic.   Eyes: Pupils are equal, round, and reactive to light. Conjunctivae are normal. No scleral icterus.   Neck: Normal range of motion. No thyromegaly present.   Cardiovascular: Normal rate, regular rhythm and normal heart sounds.  Exam reveals no gallop and no friction rub.    Pulmonary/Chest: Effort normal. No respiratory distress.   Abdominal: Soft. She exhibits no distension. There is no tenderness.    Musculoskeletal: She exhibits no tenderness or deformity.   Diffuse muscle wasting   Multiple old ecchymosis and bruises on upper extremities.    Neurological: She is alert.   Oriented to person. Not to time and place.   Skin: Skin is warm and dry. She is not diaphoretic.   Psychiatric: Cognition and memory are impaired. She expresses impulsivity. She exhibits abnormal recent memory and abnormal remote memory.   Nursing note and vitals reviewed.    Fluids  No intake or output data in the 24 hours ending 04/08/19 1745  Laboratory  Recent Labs      04/08/19   0552   WBC  8.1   RBC  3.58*   HEMOGLOBIN  11.1*   HEMATOCRIT  34.7*   MCV  96.9   MCH  31.0   MCHC  32.0*   RDW  54.2*   PLATELETCT  251   MPV  9.9     Recent Labs      04/08/19   0552   SODIUM  141   POTASSIUM  4.0   CHLORIDE  109   CO2  28   GLUCOSE  100*   BUN  30*   CREATININE  0.61   CALCIUM  8.8                   Imaging  EC-ECHOCARDIOGRAM COMPLETE W/O CONT   Final Result      DX-PELVIS-1 OR 2 VIEWS   Final Result      Left hip arthroplasty.      CT-CHEST,ABDOMEN,PELVIS WITH   Final Result      1.  Acute left femoral neck fracture is identified.      2.  Fluid-containing structures in the pelvis bilaterally are identified. These could represent urinary bladder diverticula versus ovarian cysts or adnexal cysts.      3.  No solid organ injury identified.      4.  Small renal cysts are noted.      5.  Gallbladder appears distended and may contain a gallstone.      6.  Emphysema and areas of fibrosis are noted in the chest.      7.  Extensive atherosclerosis is noted throughout the aorta and its branch vessels.         CT-HEAD W/O   Final Result         NO ACUTE ABNORMALITIES ARE NOTED ON CT SCAN OF THE HEAD.      Findings are consistent with atrophy.  Decreased attenuation in the periventricular white matter likely indicates microvascular ischemic disease.      DX-FEMUR-2+ LEFT   Final Result      Left femoral neck fracture with displacement       DX-PELVIS-1 OR 2 VIEWS   Final Result      1.  Acute left femoral neck fracture with displacement      2.  Right superior and inferior pubic rami fracture, acuity indeterminate      DX-CHEST-PORTABLE (1 VIEW)   Final Result      1.  Interstitial densities which could be fibrosis or edema      2.  Air lucency projecting over the left lung base which could be a contusion or much less likely etiology such as diaphragmatic injury. CT recommended for further assessment           Assessment/Plan  * Closed fracture of neck of left femur (HCC)- (present on admission)   Assessment & Plan    After a fall, had CT hip showing Acute left femoral neck fracture is identified, Ortho has been consulted  Patient underwent left hemiarthroplasty on 3/26/2019.  PT/OT.  the patient is off restraint for 24 hours.  Cleared for discharge from medical stand point   Heparin for DVT ppx.   Difficult placement due to lack of discharge planning after acceptance to SNF.        Dysphagia- (present on admission)   Assessment & Plan    Initially failed swallow evaluation.  Per POA, patient does not want any tube feeding.  Speech following.  Now, the patient is on pureed diet.  Risk of aspiration still exist.  POA aware of that risk.  Palliative following. No hospice recommended at this point.      Late onset Alzheimer's disease without behavioral disturbance- (present on admission)   Assessment & Plan    High risk for hospital-acquired delirium.  Avoid benzodiazepines and/or anti-cholinergic medications.  Avoid any sedatives or hypnotics.  Minimize lines.  Willfully catheter.  Daily orientation  Keep off restraints.  Continue low dose Risperdal.      Moderate protein-calorie malnutrition (HCC)- (present on admission)   Assessment & Plan    poor p.o. intake.  No tube feeding per POA.  Palliative consulted.  The patient is able to tolerate oral diet.     Paroxysmal atrial fibrillation (HCC)- (present on admission)   Assessment & Plan    Patient was  found to be on A. fib with RVR, echocardiogram indicate LV EF of 75% and mild MR and TR.  Continue on low-dose metoprolol, patient most probably is not good candidate for anticoagulation due to risk of fall and age     Normocytic anemia- (present on admission)   Assessment & Plan    Monitor H&H. Adequate folate level. B12 level is low normal range.  Give supplement via IM x 1 dose. Continue with oral supplements.     Prediabetes- (present on admission)   Assessment & Plan    Hgb A1c is 5.9  Stable      Hypokalemia- (present on admission)   Assessment & Plan    Replaced   Monitor periodically      Hypercholesterolemia- (present on admission)   Assessment & Plan    Total cholesterol is 150 and LDL is 74.     Plan of care discussed with multidisciplinary team during rounds.    VTE prophylaxis: Heparin

## 2019-04-09 NOTE — THERAPY
"Physical Therapy Treatment completed.   Bed Mobility:  Supine to Sit: Maximal Assist  Transfers: Sit to Stand: Moderate Assist  Gait: Level Of Assist: Minimal Assist with Front-Wheel Walker  3ft     Plan of Care: Will benefit from Physical Therapy 4 times per week  Discharge Recommendations: Equipment: Will Continue to Assess for Equipment Needs. Post-acute therapy Recommend inpatient transitional care services for continued physical therapy services.      Pt lethargic initially but once sitting EOB able to hold self without support. Followed single step commands well. Able to perform SLS on L LE today and achieve foot clearance bilaterally. Able to take a few steps to chair vs just a pivot transfer. Once sitting in chair became very engaged in breakfast after therapist set it up for her. Continue to recommend post acute placement prior to discharge home. Acute PT to continue to follow while in house.     See \"Rehab Therapy-Acute\" Patient Summary Report for complete documentation.       "

## 2019-04-09 NOTE — PALLIATIVE CARE
Palliative Care follow-up  Call placed to JORGE Johnson for updates on current DC situation. Pt declined at all facilities, no DC plan beyond SNF given she was living alone prior. Uncertain of pt's monthly income to determine eligibility for Medicaid. Call placed to JOSE Marcum and message left requesting a call back to further discuss her POC and DC options.    Updated: JORGE Johnson    Plan: f/u on POC when Jossue calls back    Thank you for allowing Palliative Care to participate in this patient's care. Please feel free to call [phone] with any questions or concerns.

## 2019-04-10 PROCEDURE — A9270 NON-COVERED ITEM OR SERVICE: HCPCS | Performed by: HOSPITALIST

## 2019-04-10 PROCEDURE — A9270 NON-COVERED ITEM OR SERVICE: HCPCS | Performed by: ORTHOPAEDIC SURGERY

## 2019-04-10 PROCEDURE — 770006 HCHG ROOM/CARE - MED/SURG/GYN SEMI*

## 2019-04-10 PROCEDURE — 700111 HCHG RX REV CODE 636 W/ 250 OVERRIDE (IP): Performed by: FAMILY MEDICINE

## 2019-04-10 PROCEDURE — A9270 NON-COVERED ITEM OR SERVICE: HCPCS | Performed by: INTERNAL MEDICINE

## 2019-04-10 PROCEDURE — 700102 HCHG RX REV CODE 250 W/ 637 OVERRIDE(OP): Performed by: INTERNAL MEDICINE

## 2019-04-10 PROCEDURE — 700112 HCHG RX REV CODE 229: Performed by: ORTHOPAEDIC SURGERY

## 2019-04-10 PROCEDURE — 97530 THERAPEUTIC ACTIVITIES: CPT

## 2019-04-10 PROCEDURE — 99232 SBSQ HOSP IP/OBS MODERATE 35: CPT | Performed by: INTERNAL MEDICINE

## 2019-04-10 PROCEDURE — 700102 HCHG RX REV CODE 250 W/ 637 OVERRIDE(OP): Performed by: HOSPITALIST

## 2019-04-10 RX ADMIN — OXYCODONE HYDROCHLORIDE 2.5 MG: 5 TABLET ORAL at 15:52

## 2019-04-10 RX ADMIN — RISPERIDONE 0.5 MG: 0.5 TABLET, FILM COATED ORAL at 18:13

## 2019-04-10 RX ADMIN — METOPROLOL TARTRATE 12.5 MG: 25 TABLET, FILM COATED ORAL at 05:17

## 2019-04-10 RX ADMIN — RISPERIDONE 0.5 MG: 0.5 TABLET, FILM COATED ORAL at 05:17

## 2019-04-10 RX ADMIN — HEPARIN SODIUM 5000 UNITS: 5000 INJECTION, SOLUTION INTRAVENOUS; SUBCUTANEOUS at 05:18

## 2019-04-10 RX ADMIN — METOPROLOL TARTRATE 12.5 MG: 25 TABLET, FILM COATED ORAL at 18:13

## 2019-04-10 RX ADMIN — HEPARIN SODIUM 5000 UNITS: 5000 INJECTION, SOLUTION INTRAVENOUS; SUBCUTANEOUS at 21:03

## 2019-04-10 RX ADMIN — QUETIAPINE FUMARATE 50 MG: 25 TABLET ORAL at 21:03

## 2019-04-10 RX ADMIN — HEPARIN SODIUM 5000 UNITS: 5000 INJECTION, SOLUTION INTRAVENOUS; SUBCUTANEOUS at 15:56

## 2019-04-10 RX ADMIN — DOCUSATE SODIUM 100 MG: 100 CAPSULE, LIQUID FILLED ORAL at 05:18

## 2019-04-10 ASSESSMENT — PAIN SCALES - PAIN ASSESSMENT IN ADVANCED DEMENTIA (PAINAD)
FACIALEXPRESSION: SMILING OR INEXPRESSIVE
CONSOLABILITY: DISTRACTED OR REASSURED BY VOICE/TOUCH
TOTALSCORE: 5
CONSOLABILITY: DISTRACTED OR REASSURED BY VOICE/TOUCH
FACIALEXPRESSION: SAD, FRIGHTENED, FROWN
NEGVOCALIZATION: OCCASIONAL MOAN/GROAN, LOW SPEECH, NEGATIVE/DISAPPROVING QUALITY
TOTALSCORE: 7
BODYLANGUAGE: TENSE, DISTRESSED PACING, FIDGETING
BREATHING: NORMAL
BREATHING: NOISY LABORED BREATHING, LONG PERIODS OF HYPERVENTILATION, CHEYNE-STOKES RESPIRATIONS
TOTALSCORE: 0
BREATHING: OCCASIONAL LABORED BREATHING, SHORT PERIOD OF HYPERVENTILATION
FACIALEXPRESSION: SAD, FRIGHTENED, FROWN
NEGVOCALIZATION: REPEATED TROUBLED CALLING OUT, LOUD MOANING/GROANING, CRYING
CONSOLABILITY: NO NEED TO CONSOLE
BODYLANGUAGE: RELAXED
BODYLANGUAGE: TENSE, DISTRESSED PACING, FIDGETING

## 2019-04-10 ASSESSMENT — COGNITIVE AND FUNCTIONAL STATUS - GENERAL
MOVING FROM LYING ON BACK TO SITTING ON SIDE OF FLAT BED: UNABLE
MOBILITY SCORE: 9
STANDING UP FROM CHAIR USING ARMS: A LITTLE
SUGGESTED CMS G CODE MODIFIER MOBILITY: CM
WALKING IN HOSPITAL ROOM: A LOT
TURNING FROM BACK TO SIDE WHILE IN FLAT BAD: UNABLE
CLIMB 3 TO 5 STEPS WITH RAILING: TOTAL
MOVING TO AND FROM BED TO CHAIR: UNABLE

## 2019-04-10 NOTE — CARE PLAN
Problem: Safety  Goal: Will remain free from injury  Outcome: PROGRESSING AS EXPECTED  Safety precautions in place. Call light within reach. Bed is low and in locked position. Hourly rounding in place. Bed alarm activated    Problem: Pain Management  Goal: Pain level will decrease to patient's comfort goal  Outcome: PROGRESSING AS EXPECTED   Follow pain managment plan developed in collaboration with patient and Interdisciplinary Team  Pain well managed with current regimen per MAR

## 2019-04-10 NOTE — PROGRESS NOTES
[facility] Medicine Daily Progress Note    Date of Service  4/10/2019    Chief Complaint  89 y.o. female admitted 3/25/2019 with left femoral neck fracture after ground level fall.    Hospital Course    89 y.o. female who presented 3/25/2019 with past medical history of Parkinson's disease, dementia, osteoporosis, paroxysmal atrial fibrillation, admitted with fall/syncope apparently patient was found on the floor by caretaker. EMS found patient in Afib/RVR. Due to concern for possible sepsis patient had pan CT that showed left-sided neck femoral fracture but no other acute findings, patient received broad-spectrum antibiotics. Hip fracture was surgically repaired on 3/26 with Dr. Brian. She remains confused at her baseline.        Interval Problem Update  She is afebrile    Consultants/Specialty  Orthopedic surgery  Palliative care    Code Status  DNR/DNI    Disposition  snf    Review of Systems  Review of Systems   Unable to perform ROS: Dementia        Physical Exam  Temp:  [36.4 °C (97.6 °F)-36.7 °C (98 °F)] 36.4 °C (97.6 °F)  Pulse:  [72-78] 72  Resp:  [16-18] 16  BP: (108-124)/(49-55) 108/49  SpO2:  [90 %-95 %] 95 %    Physical Exam   Constitutional: No distress.   HENT:   Mouth/Throat: Oropharynx is clear and moist. No oropharyngeal exudate.   Eyes: Pupils are equal, round, and reactive to light. No scleral icterus.   Neck: No JVD present. No tracheal deviation present.   Cardiovascular: Normal rate and regular rhythm.    Pulmonary/Chest: Effort normal and breath sounds normal. No respiratory distress. She has no wheezes.   Abdominal: Soft. She exhibits no distension. There is no tenderness.   Musculoskeletal: She exhibits no edema.   Neurological: She is alert.   Skin: Skin is dry. No erythema.   Psychiatric: Cognition and memory are impaired. She expresses impulsivity.   Nursing note and vitals reviewed.      Fluids    Intake/Output Summary (Last 24 hours) at 04/10/19 1146  Last data filed at 04/09/19  2000   Gross per 24 hour   Intake              460 ml   Output                0 ml   Net              460 ml       Laboratory  Recent Labs      04/08/19   0552   WBC  8.1   RBC  3.58*   HEMOGLOBIN  11.1*   HEMATOCRIT  34.7*   MCV  96.9   MCH  31.0   MCHC  32.0*   RDW  54.2*   PLATELETCT  251   MPV  9.9     Recent Labs      04/08/19   0552   SODIUM  141   POTASSIUM  4.0   CHLORIDE  109   CO2  28   GLUCOSE  100*   BUN  30*   CREATININE  0.61   CALCIUM  8.8                   Imaging  EC-ECHOCARDIOGRAM COMPLETE W/O CONT   Final Result      DX-PELVIS-1 OR 2 VIEWS   Final Result      Left hip arthroplasty.      CT-CHEST,ABDOMEN,PELVIS WITH   Final Result      1.  Acute left femoral neck fracture is identified.      2.  Fluid-containing structures in the pelvis bilaterally are identified. These could represent urinary bladder diverticula versus ovarian cysts or adnexal cysts.      3.  No solid organ injury identified.      4.  Small renal cysts are noted.      5.  Gallbladder appears distended and may contain a gallstone.      6.  Emphysema and areas of fibrosis are noted in the chest.      7.  Extensive atherosclerosis is noted throughout the aorta and its branch vessels.         CT-HEAD W/O   Final Result         NO ACUTE ABNORMALITIES ARE NOTED ON CT SCAN OF THE HEAD.      Findings are consistent with atrophy.  Decreased attenuation in the periventricular white matter likely indicates microvascular ischemic disease.      DX-FEMUR-2+ LEFT   Final Result      Left femoral neck fracture with displacement      DX-PELVIS-1 OR 2 VIEWS   Final Result      1.  Acute left femoral neck fracture with displacement      2.  Right superior and inferior pubic rami fracture, acuity indeterminate      DX-CHEST-PORTABLE (1 VIEW)   Final Result      1.  Interstitial densities which could be fibrosis or edema      2.  Air lucency projecting over the left lung base which could be a contusion or much less likely etiology such as  diaphragmatic injury. CT recommended for further assessment           Assessment/Plan  * Closed fracture of neck of left femur (HCC)- (present on admission)   Assessment & Plan    After a fall, had CT hip showing Acute left femoral neck fracture   Patient underwent left hemiarthroplasty on 3/26/2019.  PT/OT.  Placement pending   Staples removed 4/9       Dysphagia- (present on admission)   Assessment & Plan    Initially failed swallow evaluation.  Per POA, patient does not want any tube feeding.  Speech therapy, continue pureed diet     Late onset Alzheimer's disease without behavioral disturbance- (present on admission)   Assessment & Plan    Improved on low dose Risperdal.      Moderate protein-calorie malnutrition (HCC)- (present on admission)   Assessment & Plan    Encourage oral intake, to tube feeds per POA request     Paroxysmal atrial fibrillation (HCC)- (present on admission)   Assessment & Plan    Patient was found to be on A. fib with RVR, echocardiogram indicate LV EF of 75% and mild MR and TR.  Continue on low-dose metoprolol hold for low blood pressure     Normocytic anemia- (present on admission)   Assessment & Plan    Stable hemoglobin level     Hypokalemia- (present on admission)   Assessment & Plan    Replaced        Hypercholesterolemia- (present on admission)   Assessment & Plan    No evidence for statin therapy at this age          VTE prophylaxis: heparin

## 2019-04-10 NOTE — THERAPY
"Physical Therapy Treatment completed.   Bed Mobility:  Supine to Sit: Maximal Assist  Transfers: Sit to Stand: Moderate Assist  Gait: Level Of Assist: Minimal Assist with front wheeled walker   Plan of Care: Will benefit from Physical Therapy 4 times per week  Discharge Recommendations: Equipment: Will Continue to Assess for Equipment Needs. Post-acute therapy Recommend inpatient transitional care services for continued physical therapy services.        See \"Rehab Therapy-Acute\" Patient Summary Report for complete documentation.     Pt lethargic at beginning but once EOB pt with eyes opened and more engaged with therapist. Pt urgently requesting use of BSC. Therefore performed stand step pivot with modA for balance and step by step ceus for weight shifting and advancing BLE. She performed multiple sit to stands with posture faciliation and mod A while RN assist with pericare. Pt immediately initiated laying back supine despite encouragement to get to chair or progress mobility. At this time continue to recommend post acute placement prior to dc home. Will continue to follow while in house to progress mobility.   "

## 2019-04-10 NOTE — PALLIATIVE CARE
Palliative Care follow-up  PC RN alerted by PFA that the pt had medicaid benefits. Left message with SW to discuss referrals for long term placement. Awaiting call back.      Plan: long term placement    Thank you for allowing Palliative Care to participate in this patient's care. Please feel free to call [phone] with any questions or concerns.

## 2019-04-11 PROCEDURE — 99232 SBSQ HOSP IP/OBS MODERATE 35: CPT | Performed by: INTERNAL MEDICINE

## 2019-04-11 PROCEDURE — A9270 NON-COVERED ITEM OR SERVICE: HCPCS | Performed by: ORTHOPAEDIC SURGERY

## 2019-04-11 PROCEDURE — 700112 HCHG RX REV CODE 229: Performed by: ORTHOPAEDIC SURGERY

## 2019-04-11 PROCEDURE — 700102 HCHG RX REV CODE 250 W/ 637 OVERRIDE(OP): Performed by: INTERNAL MEDICINE

## 2019-04-11 PROCEDURE — 700102 HCHG RX REV CODE 250 W/ 637 OVERRIDE(OP): Performed by: HOSPITALIST

## 2019-04-11 PROCEDURE — 97530 THERAPEUTIC ACTIVITIES: CPT

## 2019-04-11 PROCEDURE — 700102 HCHG RX REV CODE 250 W/ 637 OVERRIDE(OP): Performed by: ORTHOPAEDIC SURGERY

## 2019-04-11 PROCEDURE — A9270 NON-COVERED ITEM OR SERVICE: HCPCS | Performed by: HOSPITALIST

## 2019-04-11 PROCEDURE — 700111 HCHG RX REV CODE 636 W/ 250 OVERRIDE (IP): Performed by: FAMILY MEDICINE

## 2019-04-11 PROCEDURE — A9270 NON-COVERED ITEM OR SERVICE: HCPCS | Performed by: INTERNAL MEDICINE

## 2019-04-11 PROCEDURE — 770006 HCHG ROOM/CARE - MED/SURG/GYN SEMI*

## 2019-04-11 RX ADMIN — RISPERIDONE 0.5 MG: 0.5 TABLET, FILM COATED ORAL at 18:21

## 2019-04-11 RX ADMIN — DOCUSATE SODIUM 100 MG: 100 CAPSULE, LIQUID FILLED ORAL at 05:59

## 2019-04-11 RX ADMIN — RISPERIDONE 0.5 MG: 0.5 TABLET, FILM COATED ORAL at 05:58

## 2019-04-11 RX ADMIN — METOPROLOL TARTRATE 12.5 MG: 25 TABLET, FILM COATED ORAL at 05:58

## 2019-04-11 RX ADMIN — HEPARIN SODIUM 5000 UNITS: 5000 INJECTION, SOLUTION INTRAVENOUS; SUBCUTANEOUS at 20:59

## 2019-04-11 RX ADMIN — HEPARIN SODIUM 5000 UNITS: 5000 INJECTION, SOLUTION INTRAVENOUS; SUBCUTANEOUS at 05:57

## 2019-04-11 RX ADMIN — SENNOSIDES AND DOCUSATE SODIUM 1 TABLET: 8.6; 5 TABLET ORAL at 20:59

## 2019-04-11 RX ADMIN — METOPROLOL TARTRATE 12.5 MG: 25 TABLET, FILM COATED ORAL at 18:21

## 2019-04-11 ASSESSMENT — COGNITIVE AND FUNCTIONAL STATUS - GENERAL
DRESSING REGULAR LOWER BODY CLOTHING: A LOT
MOVING FROM LYING ON BACK TO SITTING ON SIDE OF FLAT BED: UNABLE
TURNING FROM BACK TO SIDE WHILE IN FLAT BAD: UNABLE
MOBILITY SCORE: 9
DRESSING REGULAR UPPER BODY CLOTHING: A LITTLE
PERSONAL GROOMING: A LITTLE
MOVING TO AND FROM BED TO CHAIR: UNABLE
SUGGESTED CMS G CODE MODIFIER DAILY ACTIVITY: CK
STANDING UP FROM CHAIR USING ARMS: A LITTLE
DAILY ACTIVITIY SCORE: 16
CLIMB 3 TO 5 STEPS WITH RAILING: TOTAL
TOILETING: A LOT
HELP NEEDED FOR BATHING: A LOT
WALKING IN HOSPITAL ROOM: A LOT
SUGGESTED CMS G CODE MODIFIER MOBILITY: CM

## 2019-04-11 ASSESSMENT — PAIN SCALES - PAIN ASSESSMENT IN ADVANCED DEMENTIA (PAINAD)
FACIALEXPRESSION: SMILING OR INEXPRESSIVE
CONSOLABILITY: NO NEED TO CONSOLE
FACIALEXPRESSION: SMILING OR INEXPRESSIVE
FACIALEXPRESSION: SMILING OR INEXPRESSIVE
BODYLANGUAGE: RELAXED
FACIALEXPRESSION: SMILING OR INEXPRESSIVE
TOTALSCORE: 0
BREATHING: NORMAL
BREATHING: NORMAL
CONSOLABILITY: NO NEED TO CONSOLE
TOTALSCORE: 0
TOTALSCORE: 0
CONSOLABILITY: NO NEED TO CONSOLE
FACIALEXPRESSION: SMILING OR INEXPRESSIVE
BODYLANGUAGE: RELAXED
CONSOLABILITY: NO NEED TO CONSOLE
TOTALSCORE: 0
BREATHING: NORMAL
BODYLANGUAGE: RELAXED
BREATHING: NORMAL
BREATHING: NORMAL
TOTALSCORE: 0
TOTALSCORE: 0
BODYLANGUAGE: RELAXED
FACIALEXPRESSION: SMILING OR INEXPRESSIVE
BREATHING: NORMAL
BODYLANGUAGE: RELAXED
BREATHING: NORMAL
TOTALSCORE: 0
CONSOLABILITY: NO NEED TO CONSOLE
CONSOLABILITY: NO NEED TO CONSOLE
FACIALEXPRESSION: SMILING OR INEXPRESSIVE
CONSOLABILITY: NO NEED TO CONSOLE

## 2019-04-11 ASSESSMENT — GAIT ASSESSMENTS
GAIT LEVEL OF ASSIST: MINIMAL ASSIST
DISTANCE (FEET): 3
ASSISTIVE DEVICE: FRONT WHEEL WALKER

## 2019-04-11 NOTE — CARE PLAN
Problem: Venous Thromboembolism (VTW)/Deep Vein Thrombosis (DVT) Prevention:  Goal: Patient will participate in Venous Thrombosis (VTE)/Deep Vein Thrombosis (DVT)Prevention Measures  Outcome: PROGRESSING SLOWER THAN EXPECTED  Pt refuses SCDs to bilateral lower extremities for mechanical DVT prophylaxis. Pt continuously takes off SCDs despite education. Unfractionated heparin given per MAR for pharmacological DVT prophylaxis.     Problem: Bowel/Gastric:  Goal: Normal bowel function is maintained or improved  Outcome: PROGRESSING AS EXPECTED  Pt had a bowel movement 4/10.  Bowel sounds normoactive in all four quadrants. Pt is incontinent of stool at baseline.

## 2019-04-11 NOTE — PROGRESS NOTES
· 2 RN skin check complete with TERRY Diaz.  · Devices in place: none.  · Skin assessed under devices N/A.  · Confirmed pressure ulcers found on N/A.  · The following interventions in place: Patient turned q 2 hours. Waffle overlay in place. Mepilex applied to both elbows. Waffle used when patient is in chair. Heels and sacrum blanching. Scattered scabs and bruising to both legs.

## 2019-04-11 NOTE — PROGRESS NOTES
Hospital Medicine Daily Progress Note    Date of Service  4/11/2019    Chief Complaint  89 y.o. female admitted 3/25/2019 with left femoral neck fracture after ground level fall.    Hospital Course    89 y.o. female who presented 3/25/2019 with past medical history of Parkinson's disease, dementia, osteoporosis, paroxysmal atrial fibrillation, admitted with fall/syncope apparently patient was found on the floor by caretaker. EMS found patient in Afib/RVR. Due to concern for possible sepsis patient had pan CT that showed left-sided neck femoral fracture but no other acute findings, patient received broad-spectrum antibiotics. Hip fracture was surgically repaired on 3/26 with Dr. Brian. She remains confused at her baseline.        Interval Problem Update  The  Patient has her staples out and wound is closed with steristrips    Consultants/Specialty  Orthopedic surgery  Palliative care    Code Status  DNR/DNI    Disposition  snf    Review of Systems  Review of Systems   Unable to perform ROS: Dementia        Physical Exam  Temp:  [36.5 °C (97.7 °F)-37 °C (98.6 °F)] 36.7 °C (98 °F)  Pulse:  [64-90] 64  Resp:  [15-16] 16  BP: (106-148)/(51-64) 134/51  SpO2:  [92 %-100 %] 100 %    Physical Exam   Constitutional: No distress.   HENT:   Mouth/Throat: No oropharyngeal exudate.   Eyes: Pupils are equal, round, and reactive to light. Conjunctivae are normal. No scleral icterus.   Neck: No JVD present. No tracheal deviation present.   Cardiovascular: Normal rate and regular rhythm.    Pulmonary/Chest: Effort normal and breath sounds normal.   Abdominal: Soft. Bowel sounds are normal. There is no tenderness.   Musculoskeletal: She exhibits no edema.   Left hip wound closed with steri strips in place   Neurological: She is alert.   Skin: Skin is warm and dry. She is not diaphoretic. No erythema.   Psychiatric: Cognition and memory are impaired. She expresses impulsivity.   Nursing note and vitals  reviewed.      Fluids    Intake/Output Summary (Last 24 hours) at 04/11/19 1544  Last data filed at 04/11/19 0900   Gross per 24 hour   Intake              240 ml   Output                0 ml   Net              240 ml                       Imaging  EC-ECHOCARDIOGRAM COMPLETE W/O CONT   Final Result      DX-PELVIS-1 OR 2 VIEWS   Final Result      Left hip arthroplasty.      CT-CHEST,ABDOMEN,PELVIS WITH   Final Result      1.  Acute left femoral neck fracture is identified.      2.  Fluid-containing structures in the pelvis bilaterally are identified. These could represent urinary bladder diverticula versus ovarian cysts or adnexal cysts.      3.  No solid organ injury identified.      4.  Small renal cysts are noted.      5.  Gallbladder appears distended and may contain a gallstone.      6.  Emphysema and areas of fibrosis are noted in the chest.      7.  Extensive atherosclerosis is noted throughout the aorta and its branch vessels.         CT-HEAD W/O   Final Result         NO ACUTE ABNORMALITIES ARE NOTED ON CT SCAN OF THE HEAD.      Findings are consistent with atrophy.  Decreased attenuation in the periventricular white matter likely indicates microvascular ischemic disease.      DX-FEMUR-2+ LEFT   Final Result      Left femoral neck fracture with displacement      DX-PELVIS-1 OR 2 VIEWS   Final Result      1.  Acute left femoral neck fracture with displacement      2.  Right superior and inferior pubic rami fracture, acuity indeterminate      DX-CHEST-PORTABLE (1 VIEW)   Final Result      1.  Interstitial densities which could be fibrosis or edema      2.  Air lucency projecting over the left lung base which could be a contusion or much less likely etiology such as diaphragmatic injury. CT recommended for further assessment           Assessment/Plan  * Closed fracture of neck of left femur (HCC)- (present on admission)   Assessment & Plan    After a fall, had CT hip showing Acute left femoral neck fracture    Patient underwent left hemiarthroplasty on 3/26/2019.  PT/OT.  Placement pending   Staples removed 4/9 and healing as expected       Dysphagia- (present on admission)   Assessment & Plan    Initially failed swallow evaluation.  Per POA, patient does not want any tube feeding.  Speech therapy, continue pureed diet     Late onset Alzheimer's disease without behavioral disturbance- (present on admission)   Assessment & Plan     low dose Risperdal.      Moderate protein-calorie malnutrition (HCC)- (present on admission)   Assessment & Plan    Encourage oral intake, to tube feeds per POA request     Paroxysmal atrial fibrillation (HCC)- (present on admission)   Assessment & Plan    Patient was found to be on A. fib with RVR, echocardiogram indicate LV EF of 75% and mild MR and TR.  Continue on low-dose metoprolol hold for low blood pressure     Normocytic anemia- (present on admission)   Assessment & Plan    Stable hemoglobin level     Hypokalemia- (present on admission)   Assessment & Plan    Replaced        Hypercholesterolemia- (present on admission)   Assessment & Plan    No evidence for statin therapy at this age          VTE prophylaxis: heparin

## 2019-04-11 NOTE — THERAPY
"Occupational Therapy Treatment completed with focus on ADLs and ADL transfers.  Functional Status:  Mod/max A with ADLs and txfs, 2/2 decreased cognition, and weakness.  Plan of Care: Will benefit from Occupational Therapy 3 times per week  Discharge Recommendations:  Equipment Will Continue to Assess for Equipment Needs. Post-acute therapy Discharge to a transitional care facility for continued therapy services.    See \"Rehab Therapy-Acute\" Patient Summary Report for complete documentation.   "

## 2019-04-11 NOTE — THERAPY
"Physical Therapy Treatment completed.   Bed Mobility:  Supine to Sit: Moderate Assist  Transfers: Sit to Stand: Moderate Assist  Gait: Level Of Assist: Minimal Assist with Front-Wheel Walker 3ft       Plan of Care: Will benefit from Physical Therapy 4 times per week  Discharge Recommendations: Equipment: Will Continue to Assess for Equipment Needs. Post-acute therapy Recommend inpatient transitional care services for continued physical therapy services.        See \"Rehab Therapy-Acute\" Patient Summary Report for complete documentation.     Pt pleasant and more alert today. She continues to require assist for all mobility at this time. She was able to take a few steps with fww and Christi for balance and management of fww. She is unable to cite posterior precautions, providing cues and education. Pt continues to make slow progress with therapy as she is mainly limited but fatigue, and generalized weakness. Continue to recommend post acute transitional care facility prior to dc home. Will continue to follow while in house to progress mobility.   "

## 2019-04-12 PROCEDURE — 99232 SBSQ HOSP IP/OBS MODERATE 35: CPT | Performed by: INTERNAL MEDICINE

## 2019-04-12 PROCEDURE — 97116 GAIT TRAINING THERAPY: CPT

## 2019-04-12 PROCEDURE — A9270 NON-COVERED ITEM OR SERVICE: HCPCS | Performed by: ORTHOPAEDIC SURGERY

## 2019-04-12 PROCEDURE — 700102 HCHG RX REV CODE 250 W/ 637 OVERRIDE(OP): Performed by: HOSPITALIST

## 2019-04-12 PROCEDURE — A9270 NON-COVERED ITEM OR SERVICE: HCPCS | Performed by: HOSPITALIST

## 2019-04-12 PROCEDURE — 700102 HCHG RX REV CODE 250 W/ 637 OVERRIDE(OP): Performed by: INTERNAL MEDICINE

## 2019-04-12 PROCEDURE — 700112 HCHG RX REV CODE 229: Performed by: ORTHOPAEDIC SURGERY

## 2019-04-12 PROCEDURE — 700111 HCHG RX REV CODE 636 W/ 250 OVERRIDE (IP): Performed by: FAMILY MEDICINE

## 2019-04-12 PROCEDURE — 700102 HCHG RX REV CODE 250 W/ 637 OVERRIDE(OP): Performed by: ORTHOPAEDIC SURGERY

## 2019-04-12 PROCEDURE — 97530 THERAPEUTIC ACTIVITIES: CPT

## 2019-04-12 PROCEDURE — 770006 HCHG ROOM/CARE - MED/SURG/GYN SEMI*

## 2019-04-12 PROCEDURE — A9270 NON-COVERED ITEM OR SERVICE: HCPCS | Performed by: INTERNAL MEDICINE

## 2019-04-12 RX ADMIN — METOPROLOL TARTRATE 12.5 MG: 25 TABLET, FILM COATED ORAL at 18:03

## 2019-04-12 RX ADMIN — OXYCODONE HYDROCHLORIDE 2.5 MG: 5 TABLET ORAL at 23:06

## 2019-04-12 RX ADMIN — HEPARIN SODIUM 5000 UNITS: 5000 INJECTION, SOLUTION INTRAVENOUS; SUBCUTANEOUS at 22:54

## 2019-04-12 RX ADMIN — DOCUSATE SODIUM 100 MG: 100 CAPSULE, LIQUID FILLED ORAL at 18:04

## 2019-04-12 RX ADMIN — QUETIAPINE FUMARATE 50 MG: 25 TABLET ORAL at 22:54

## 2019-04-12 RX ADMIN — HEPARIN SODIUM 5000 UNITS: 5000 INJECTION, SOLUTION INTRAVENOUS; SUBCUTANEOUS at 05:31

## 2019-04-12 RX ADMIN — RISPERIDONE 0.5 MG: 0.5 TABLET, FILM COATED ORAL at 18:04

## 2019-04-12 RX ADMIN — HEPARIN SODIUM 5000 UNITS: 5000 INJECTION, SOLUTION INTRAVENOUS; SUBCUTANEOUS at 15:07

## 2019-04-12 RX ADMIN — METOPROLOL TARTRATE 12.5 MG: 25 TABLET, FILM COATED ORAL at 05:31

## 2019-04-12 RX ADMIN — RISPERIDONE 0.5 MG: 0.5 TABLET, FILM COATED ORAL at 05:31

## 2019-04-12 RX ADMIN — SENNOSIDES AND DOCUSATE SODIUM 1 TABLET: 8.6; 5 TABLET ORAL at 22:54

## 2019-04-12 ASSESSMENT — PAIN SCALES - PAIN ASSESSMENT IN ADVANCED DEMENTIA (PAINAD)
FACIALEXPRESSION: SMILING OR INEXPRESSIVE
CONSOLABILITY: NO NEED TO CONSOLE
FACIALEXPRESSION: SMILING OR INEXPRESSIVE
FACIALEXPRESSION: SMILING OR INEXPRESSIVE
CONSOLABILITY: NO NEED TO CONSOLE
TOTALSCORE: 0
BREATHING: NORMAL
BODYLANGUAGE: RELAXED
CONSOLABILITY: NO NEED TO CONSOLE
BREATHING: NORMAL
NEGVOCALIZATION: REPEATED TROUBLED CALLING OUT, LOUD MOANING/GROANING, CRYING
BODYLANGUAGE: RELAXED
BODYLANGUAGE: TENSE, DISTRESSED PACING, FIDGETING
TOTALSCORE: 0
BODYLANGUAGE: RELAXED
FACIALEXPRESSION: SMILING OR INEXPRESSIVE
CONSOLABILITY: NO NEED TO CONSOLE
FACIALEXPRESSION: SMILING OR INEXPRESSIVE
BREATHING: NORMAL
BREATHING: NORMAL
BODYLANGUAGE: RELAXED
TOTALSCORE: 0
BREATHING: NORMAL
CONSOLABILITY: UNABLE TO CONSOLE, DISTRACT OR REASSURE
TOTALSCORE: 0
TOTALSCORE: 5

## 2019-04-12 ASSESSMENT — COGNITIVE AND FUNCTIONAL STATUS - GENERAL
MOVING FROM LYING ON BACK TO SITTING ON SIDE OF FLAT BED: UNABLE
MOBILITY SCORE: 10
TURNING FROM BACK TO SIDE WHILE IN FLAT BAD: UNABLE
CLIMB 3 TO 5 STEPS WITH RAILING: TOTAL
STANDING UP FROM CHAIR USING ARMS: A LITTLE
WALKING IN HOSPITAL ROOM: A LOT
SUGGESTED CMS G CODE MODIFIER MOBILITY: CL
MOVING TO AND FROM BED TO CHAIR: A LOT

## 2019-04-12 ASSESSMENT — GAIT ASSESSMENTS
DISTANCE (FEET): 10
ASSISTIVE DEVICE: FRONT WHEEL WALKER
GAIT LEVEL OF ASSIST: MODERATE ASSIST

## 2019-04-12 NOTE — DISCHARGE PLANNING
Met with patient's POA Father Jossue to discuss discharge plans, made him aware the Medicaid patient has for possible long term bed is only co-pay and deduct, so it would not cover a long term bed in a SNF.  Gave him a list of group homes for patient and he also has some [facility] that has a few group homes he has been in touch with. He would also like patient to be referred to [facility].

## 2019-04-12 NOTE — PROGRESS NOTES
· 2 RN skin check complete with TERRY Reyes.  · Devices in place: none.   · Skin assessed under devices: N/A.  · Incision site over left hip clean, dry, intact. Redness in bilateral ears, skin is blanching. Scattered bruising over bilateral upper and lower extremities. Bruising in left sacral region. Minimal scabbing in bilateral lower extremities.  · The following interventions in place: q2h turns, waffle cushion, barrier cream, pillows in use for support/positioning.

## 2019-04-12 NOTE — DIETARY
Nutrition Services: Update   Day 18 of admit.  Rosalba Wagner is a 89 y.o. female with admitting DX of A-fib, Femur fracture    Pt is currently on regular, dysphagia 1 diet with nectar thick liquids and 1:1 supervision. Pt is receiving [facility] TID with meals. Per chart pt PO varies % of meals and most supplements % that are documented. Wt 4/10: 46.7 kg via bed scale - wt has trended up since wt on 4/5. Estimated kcals/day based of MSJ x 1.2 = 931 kcals. Suspect pt is meeting estimated nutritional need with PO intake of meals and supplements.     Malnutrition Risk: No criteria noted at this time.     Recommendations/Plan:  1. Encourage intake of meals  2. Document intake of all meals as % taken in ADL's to provide interdisciplinary communication across all shifts.   3. Monitor weight.  4. Nutrition rep will continue to see patient for ongoing meal and snack preferences.    RD available prn

## 2019-04-12 NOTE — CARE PLAN
Problem: Knowledge Deficit  Goal: Knowledge of the prescribed therapeutic regimen will improve  Outcome: PROGRESSING SLOWER THAN EXPECTED  Reviewed POC including safety, mobility, pain management, medication administration, DVT prophylaxis, and discharge. No learning evidence. Reinforcement needed.     Problem: Psychosocial Needs:  Goal: Level of anxiety will decrease  Pt is calm and resting in bed. Pt is is sleeping most the shift and only wakes when addressed.

## 2019-04-12 NOTE — DISCHARGE PLANNING
Received Choice form at 1137  Agency/Facility Name: [facility]  Referral sent per Choice form @ 1148   LONG TERM CARE BED

## 2019-04-12 NOTE — CARE PLAN
Problem: Safety  Goal: Will remain free from falls  Outcome: PROGRESSING AS EXPECTED  Bed alarm in place. Pt wearing treaded socks. Bed controls/brake on and bed in lowest position. Hourly rounding in place.    Problem: Mobility  Goal: Risk for activity intolerance will decrease  Outcome: PROGRESSING AS EXPECTED  Patient ambulating to bathroom using front-wheeled walker and SBA x2 with occasional hand-held assist. Patient has a steady gait, follows commands, and using walker appropriately.

## 2019-04-12 NOTE — PROGRESS NOTES
Clarified dressing order with LUCINA Singh, leave steri strips in place to left hip until it dries and falls off. No additional dressing required.

## 2019-04-12 NOTE — DISCHARGE PLANNING
Spoke with Father Jossue GARCIA for patient about possible Long Term Bed at SNF. He is agreeable to [facility] and [facility]. Verbal choice obtained and faxed to [facility]

## 2019-04-12 NOTE — THERAPY
"Physical Therapy Treatment completed.   Bed Mobility:  Supine to Sit: Moderate Assist  Transfers: Sit to Stand: Minimal Assist  Gait: Level Of Assist: Moderate Assist with Front-Wheel Walker 10ft       Plan of Care: Will benefit from Physical Therapy 4 times per week  Discharge Recommendations: Equipment: Will continue to assess. Post-acute therapy Recommend inpatient transitional care services for continued physical therapy services.        See \"Rehab Therapy-Acute\" Patient Summary Report for complete documentation.     Pt pleasant and agreeable to therapy session. She was able to progress with therapy and performed 10ft of gait training. She presented with narrow base of support and inconsistent step length. As well required modA for balance and fww management. She was able to get self back to supine only requiring assist for LLE. At this time she is making slow progress and continue to recommend post acute transitional care facility prior to dc. WIll continue to follow while in house to progress mobility.   "

## 2019-04-12 NOTE — PROGRESS NOTES
Hospital Medicine Daily Progress Note    Date of Service  4/12/2019    Chief Complaint  89 y.o. female admitted 3/25/2019 with left femoral neck fracture after ground level fall.    Hospital Course    89 y.o. female who presented 3/25/2019 with past medical history of Parkinson's disease, dementia, osteoporosis, paroxysmal atrial fibrillation, admitted with fall/syncope apparently patient was found on the floor by caretaker. EMS found patient in Afib/RVR. Due to concern for possible sepsis patient had pan CT that showed left-sided neck femoral fracture but no other acute findings, patient received broad-spectrum antibiotics. Hip fracture was surgically repaired on 3/26 with Dr. Brian. She remains confused at her baseline.        Interval Problem Update  Staples are out and the patient is afebrile  She has not needed pain medication    Consultants/Specialty  Orthopedic surgery  Palliative care    Code Status  DNR/DNI    Disposition  snf    Review of Systems  Review of Systems   Unable to perform ROS: Dementia        Physical Exam  Temp:  [36.2 °C (97.1 °F)-37 °C (98.6 °F)] 36.2 °C (97.1 °F)  Pulse:  [66-72] 67  Resp:  [15-17] 15  BP: (101-148)/(40-62) 101/40  SpO2:  [91 %-100 %] 91 %    Physical Exam   HENT:   Mouth/Throat: No oropharyngeal exudate.   Eyes: Pupils are equal, round, and reactive to light. Conjunctivae are normal. No scleral icterus.   Neck: No JVD present. No tracheal deviation present.   Cardiovascular: Normal rate and regular rhythm.    No murmur heard.  Pulmonary/Chest: Effort normal and breath sounds normal. No respiratory distress.   Abdominal: Soft. She exhibits no distension. There is no tenderness.   Musculoskeletal: She exhibits no edema.   Left hip wound closed with steri strips in place   Neurological: She is alert.   Skin: Skin is warm and dry. She is not diaphoretic. No erythema.   Psychiatric: Cognition and memory are impaired. She expresses impulsivity.   Nursing note and vitals  reviewed.      Fluids    Intake/Output Summary (Last 24 hours) at 04/12/19 1423  Last data filed at 04/12/19 0800   Gross per 24 hour   Intake              360 ml   Output                0 ml   Net              360 ml                              Assessment/Plan  * Closed fracture of neck of left femur (HCC)- (present on admission)   Assessment & Plan    After a fall, had CT hip showing Acute left femoral neck fracture   Patient underwent left hemiarthroplasty on 3/26/2019.  PT/OT patient moderate assist for walker use and ambulation  Placement pending   Staples removed 4/9 and steri strips placed       Dysphagia- (present on admission)   Assessment & Plan    Initially failed swallow evaluation.  Per POA, do not place a tube feeding.  Speech therapy, continue pureed diet     Late onset Alzheimer's disease without behavioral disturbance- (present on admission)   Assessment & Plan     low dose Risperdal.      Moderate protein-calorie malnutrition (HCC)- (present on admission)   Assessment & Plan    Encourage oral intake with puree diet, no tube feeds per POA request     Paroxysmal atrial fibrillation (HCC)- (present on admission)   Assessment & Plan    Patient was found to be on A. fib with RVR, echocardiogram indicate LV EF of 75% and mild MR and TR.  Continue on low-dose metoprolol hold for low blood pressure     Normocytic anemia- (present on admission)   Assessment & Plan    Stable hemoglobin level     Hypokalemia- (present on admission)   Assessment & Plan    Replaced        Hypercholesterolemia- (present on admission)   Assessment & Plan    No evidence for statin therapy at this age          VTE prophylaxis: heparin

## 2019-04-12 NOTE — CARE PLAN
Problem: Nutritional:  Goal: Achieve adequate nutritional intake  Advance diet as feasible and patient will consume ~50% of meals   Outcome: MET Date Met: 04/12/19

## 2019-04-13 PROCEDURE — 700102 HCHG RX REV CODE 250 W/ 637 OVERRIDE(OP): Performed by: HOSPITALIST

## 2019-04-13 PROCEDURE — A9270 NON-COVERED ITEM OR SERVICE: HCPCS | Performed by: HOSPITALIST

## 2019-04-13 PROCEDURE — 770006 HCHG ROOM/CARE - MED/SURG/GYN SEMI*

## 2019-04-13 PROCEDURE — A9270 NON-COVERED ITEM OR SERVICE: HCPCS | Performed by: INTERNAL MEDICINE

## 2019-04-13 PROCEDURE — 99231 SBSQ HOSP IP/OBS SF/LOW 25: CPT | Performed by: HOSPITALIST

## 2019-04-13 PROCEDURE — 700102 HCHG RX REV CODE 250 W/ 637 OVERRIDE(OP): Performed by: INTERNAL MEDICINE

## 2019-04-13 PROCEDURE — 700111 HCHG RX REV CODE 636 W/ 250 OVERRIDE (IP): Performed by: FAMILY MEDICINE

## 2019-04-13 RX ADMIN — METOPROLOL TARTRATE 12.5 MG: 25 TABLET, FILM COATED ORAL at 18:00

## 2019-04-13 RX ADMIN — HEPARIN SODIUM 5000 UNITS: 5000 INJECTION, SOLUTION INTRAVENOUS; SUBCUTANEOUS at 08:47

## 2019-04-13 RX ADMIN — HEPARIN SODIUM 5000 UNITS: 5000 INJECTION, SOLUTION INTRAVENOUS; SUBCUTANEOUS at 15:15

## 2019-04-13 RX ADMIN — RISPERIDONE 0.5 MG: 0.5 TABLET, FILM COATED ORAL at 18:01

## 2019-04-13 RX ADMIN — HEPARIN SODIUM 5000 UNITS: 5000 INJECTION, SOLUTION INTRAVENOUS; SUBCUTANEOUS at 22:00

## 2019-04-13 ASSESSMENT — PAIN SCALES - PAIN ASSESSMENT IN ADVANCED DEMENTIA (PAINAD)
BODYLANGUAGE: RELAXED
BREATHING: NORMAL
CONSOLABILITY: NO NEED TO CONSOLE
TOTALSCORE: 0
FACIALEXPRESSION: SMILING OR INEXPRESSIVE

## 2019-04-13 NOTE — PROGRESS NOTES
· 2 RN skin check complete with RN Alize d/t low Faizan score.  · Devices in place: none.   · Skin assessed under devices: N/A.  · Incision site to left hip clean, dry, mostly approximated, intact, open to air. Coccyx and heels intact. Small, scattered bruises and scabs noted.  · The following interventions in place: q2h turns, waffle cushion, barrier cream, pillows in use for support/positioning.

## 2019-04-13 NOTE — PROGRESS NOTES
[facility] Medicine Daily Progress Note    Date of Service  4/13/2019    Chief Complaint  89 y.o. female admitted 3/25/2019 with left femoral neck fracture after ground level fall.    Hospital Course    89 y.o. female who presented 3/25/2019 with past medical history of Parkinson's disease, dementia, osteoporosis, paroxysmal atrial fibrillation, admitted with fall/syncope apparently patient was found on the floor by caretaker. EMS found patient in Afib/RVR. Due to concern for possible sepsis patient had pan CT that showed left-sided neck femoral fracture but no other acute findings, patient received broad-spectrum antibiotics. Hip fracture was surgically repaired on 3/26 with Dr. Brian. She remains confused at her baseline.        Interval Problem Update  4/13 - she is asleep but arousable. Comfortable and confused but pleasant. She denies pain    Consultants/Specialty  Orthopedic surgery  Palliative care    Code Status  DNR/DNI    Disposition  snf when able    Review of Systems  Review of Systems   Unable to perform ROS: Dementia        Physical Exam  Temp:  [36.1 °C (97 °F)-37.1 °C (98.7 °F)] 36.1 °C (97 °F)  Pulse:  [68-90] 77  Resp:  [16-18] 17  BP: (134-164)/(51-81) 134/65  SpO2:  [92 %-95 %] 95 %    Physical Exam   HENT:   Mouth/Throat: No oropharyngeal exudate.   Eyes: Pupils are equal, round, and reactive to light. Conjunctivae are normal. No scleral icterus.   Neck: No JVD present. No tracheal deviation present.   Cardiovascular: Normal rate and regular rhythm.    No murmur heard.  Pulmonary/Chest: Effort normal and breath sounds normal. No respiratory distress.   Abdominal: Soft. She exhibits no distension. There is no tenderness.   Musculoskeletal: She exhibits no edema.   Left hip wound closed with steri strips in place   Neurological: She is alert.   Skin: Skin is warm and dry. She is not diaphoretic. No erythema.   Psychiatric: Cognition and memory are impaired. She expresses impulsivity.   Nursing  note and vitals reviewed.      Fluids    Intake/Output Summary (Last 24 hours) at 04/13/19 1439  Last data filed at 04/12/19 2315   Gross per 24 hour   Intake               30 ml   Output                0 ml   Net               30 ml                              Assessment/Plan  * Closed fracture of neck of left femur (HCC)- (present on admission)   Assessment & Plan    After a fall, had CT hip showing Acute left femoral neck fracture   Patient underwent left hemiarthroplasty on 3/26/2019.  PT/OT patient moderate assist for walker use and ambulation  Placement pending   Staples removed 4/9 and steri strips placed       Dysphagia- (present on admission)   Assessment & Plan    Initially failed swallow evaluation.  Per POA, do not place a tube feeding.  Speech therapy, continue pureed diet     Late onset Alzheimer's disease without behavioral disturbance- (present on admission)   Assessment & Plan     low dose Risperdal.      Moderate protein-calorie malnutrition (HCC)- (present on admission)   Assessment & Plan    Encourage oral intake with puree diet, no tube feeds per POA request     Paroxysmal atrial fibrillation (HCC)- (present on admission)   Assessment & Plan    Patient was found to be on A. fib with RVR, echocardiogram indicate LV EF of 75% and mild MR and TR.  Continue on low-dose metoprolol hold for low blood pressure     Normocytic anemia- (present on admission)   Assessment & Plan    Stable hemoglobin level     Hypokalemia- (present on admission)   Assessment & Plan    Replaced        Hypercholesterolemia- (present on admission)   Assessment & Plan    No evidence for statin therapy at this age      4/13 - no change in plan     VTE prophylaxis: heparin

## 2019-04-13 NOTE — PROGRESS NOTES
· 2 RN skin check complete with TERRY Bolaños.  · Devices in place: heel float boots.  · Skin assessed under devices: redness noted to bilateral heels, brisk blanching noted to left heel, slow blanching noted to right heel. Placed mepilex and heel float boots. Patient non-compliant to remain heels floated on pillows.   ·  Healed incision site to right hip, slight redness noted.   · The following interventions in place: waffle cushion, every 2 hour turns, frequent checks for incontinence, jassi cream and barrier wipes in use, heel float boots.

## 2019-04-13 NOTE — CARE PLAN
Problem: Infection  Goal: Will remain free from infection  Outcome: PROGRESSING AS EXPECTED  Standard precautions in place. Hand hygiene performed before and after patient contact.    Problem: Skin Integrity  Goal: Risk for impaired skin integrity will decrease  Outcome: PROGRESSING AS EXPECTED  Q2h turns in place, pillows in use for support/positioning, heel float boots in use, Mepilex applied over bilateral heels.

## 2019-04-14 PROCEDURE — 700111 HCHG RX REV CODE 636 W/ 250 OVERRIDE (IP): Performed by: FAMILY MEDICINE

## 2019-04-14 PROCEDURE — A9270 NON-COVERED ITEM OR SERVICE: HCPCS | Performed by: ORTHOPAEDIC SURGERY

## 2019-04-14 PROCEDURE — 99231 SBSQ HOSP IP/OBS SF/LOW 25: CPT | Performed by: HOSPITALIST

## 2019-04-14 PROCEDURE — A9270 NON-COVERED ITEM OR SERVICE: HCPCS | Performed by: HOSPITALIST

## 2019-04-14 PROCEDURE — 770006 HCHG ROOM/CARE - MED/SURG/GYN SEMI*

## 2019-04-14 PROCEDURE — 700102 HCHG RX REV CODE 250 W/ 637 OVERRIDE(OP): Performed by: INTERNAL MEDICINE

## 2019-04-14 PROCEDURE — 700102 HCHG RX REV CODE 250 W/ 637 OVERRIDE(OP): Performed by: HOSPITALIST

## 2019-04-14 PROCEDURE — 700102 HCHG RX REV CODE 250 W/ 637 OVERRIDE(OP): Performed by: ORTHOPAEDIC SURGERY

## 2019-04-14 PROCEDURE — 700112 HCHG RX REV CODE 229: Performed by: ORTHOPAEDIC SURGERY

## 2019-04-14 PROCEDURE — A9270 NON-COVERED ITEM OR SERVICE: HCPCS | Performed by: INTERNAL MEDICINE

## 2019-04-14 RX ADMIN — HEPARIN SODIUM 5000 UNITS: 5000 INJECTION, SOLUTION INTRAVENOUS; SUBCUTANEOUS at 14:09

## 2019-04-14 RX ADMIN — METOPROLOL TARTRATE 12.5 MG: 25 TABLET, FILM COATED ORAL at 05:51

## 2019-04-14 RX ADMIN — DOCUSATE SODIUM 100 MG: 100 CAPSULE, LIQUID FILLED ORAL at 05:51

## 2019-04-14 RX ADMIN — SENNOSIDES AND DOCUSATE SODIUM 1 TABLET: 8.6; 5 TABLET ORAL at 20:40

## 2019-04-14 RX ADMIN — RISPERIDONE 0.5 MG: 0.5 TABLET, FILM COATED ORAL at 05:51

## 2019-04-14 RX ADMIN — HEPARIN SODIUM 5000 UNITS: 5000 INJECTION, SOLUTION INTRAVENOUS; SUBCUTANEOUS at 05:52

## 2019-04-14 RX ADMIN — METOPROLOL TARTRATE 12.5 MG: 25 TABLET, FILM COATED ORAL at 18:00

## 2019-04-14 RX ADMIN — RISPERIDONE 0.5 MG: 0.5 TABLET, FILM COATED ORAL at 18:00

## 2019-04-14 RX ADMIN — HEPARIN SODIUM 5000 UNITS: 5000 INJECTION, SOLUTION INTRAVENOUS; SUBCUTANEOUS at 20:40

## 2019-04-14 NOTE — PROGRESS NOTES
[facility] Medicine Daily Progress Note    Date of Service  4/14/2019    Chief Complaint  89 y.o. female admitted 3/25/2019 with left femoral neck fracture after ground level fall.    Hospital Course    89 y.o. female who presented 3/25/2019 with past medical history of Parkinson's disease, dementia, osteoporosis, paroxysmal atrial fibrillation, admitted with fall/syncope apparently patient was found on the floor by caretaker. EMS found patient in Afib/RVR. Due to concern for possible sepsis patient had pan CT that showed left-sided neck femoral fracture but no other acute findings, patient received broad-spectrum antibiotics. Hip fracture was surgically repaired on 3/26 with Dr. Brian. She remains confused at her baseline.        Interval Problem Update  4/14 - she is alert and confused, thinking she is in [employer] (I did tell her it was [place]). She asks if I could turn down the TV so as to not bother the other congregants.     4/13 - she is asleep but arousable. Comfortable and confused but pleasant. She denies pain    Consultants/Specialty  Orthopedic surgery  Palliative care    Code Status  DNR/DNI    Disposition  snf when able    Review of Systems  Review of Systems   Unable to perform ROS: Dementia        Physical Exam  Temp:  [36.2 °C (97.1 °F)-36.5 °C (97.7 °F)] 36.5 °C (97.7 °F)  Pulse:  [68-89] 89  Resp:  [16-18] 17  BP: (112-132)/(48-75) 120/52  SpO2:  [93 %-96 %] 94 %    Physical Exam   HENT:   Mouth/Throat: No oropharyngeal exudate.   Eyes: Pupils are equal, round, and reactive to light. Conjunctivae are normal. No scleral icterus.   Neck: No JVD present. No tracheal deviation present.   Cardiovascular: Normal rate and regular rhythm.    No murmur heard.  Pulmonary/Chest: Effort normal and breath sounds normal. No respiratory distress.   Abdominal: Soft. She exhibits no distension. There is no tenderness.   Musculoskeletal: She exhibits no edema.   Left hip wound minimally tender   Neurological:  She is alert.   Skin: Skin is warm and dry. She is not diaphoretic. No erythema.   Psychiatric: Cognition and memory are impaired. She expresses impulsivity.   Nursing note and vitals reviewed.      Fluids    Intake/Output Summary (Last 24 hours) at 04/14/19 1435  Last data filed at 04/14/19 1000   Gross per 24 hour   Intake              530 ml   Output                0 ml   Net              530 ml                              Assessment/Plan  * Closed fracture of neck of left femur (HCC)- (present on admission)   Assessment & Plan    After a fall, had CT hip showing Acute left femoral neck fracture   Patient underwent left hemiarthroplasty on 3/26/2019.  PT/OT patient moderate assist for walker use and ambulation  Placement pending   Staples removed 4/9 and steri strips placed       Dysphagia- (present on admission)   Assessment & Plan    Initially failed swallow evaluation.  Per POA, do not place a tube feeding.  Speech therapy, continue pureed diet     Late onset Alzheimer's disease without behavioral disturbance- (present on admission)   Assessment & Plan     low dose Risperdal.      Moderate protein-calorie malnutrition (HCC)- (present on admission)   Assessment & Plan    Encourage oral intake with puree diet, no tube feeds per POA request     Paroxysmal atrial fibrillation (HCC)- (present on admission)   Assessment & Plan    Patient was found to be on A. fib with RVR, echocardiogram indicate LV EF of 75% and mild MR and TR.  Continue on low-dose metoprolol hold for low blood pressure     Normocytic anemia- (present on admission)   Assessment & Plan    Stable hemoglobin level     Hypokalemia- (present on admission)   Assessment & Plan    Replaced        Hypercholesterolemia- (present on admission)   Assessment & Plan    No evidence for statin therapy at this age      4/14 - no change in plan     VTE prophylaxis: heparin

## 2019-04-14 NOTE — CARE PLAN
Problem: Safety  Goal: Will remain free from falls  Outcome: PROGRESSING AS EXPECTED  Call light within reach, q1h checks, bed alarm set.    Problem: Venous Thromboembolism (VTW)/Deep Vein Thrombosis (DVT) Prevention:  Goal: Patient will participate in Venous Thrombosis (VTE)/Deep Vein Thrombosis (DVT)Prevention Measures  Outcome: PROGRESSING AS EXPECTED  SCDs on, heparin given as ordered.     Problem: Skin Integrity  Goal: Risk for impaired skin integrity will decrease  Outcome: PROGRESSING AS EXPECTED  Skin assessed, small scabs on shins, scattered bruising, but mostly intact. Heel float boots on, q2h turns, frequent incontinence care.

## 2019-04-14 NOTE — PROGRESS NOTES
Slept much of shift, awaking briefly when friend was at bedside earlier this shift., or when staff turned/positioned her. Keeping bilateral SCDs and moon boots on currently; receiving SQ heparin injections. Confused; could not even state her birth year. Much of speech was nonsensical. Last BM was 4/13/19. Cap refill to left toes slow at 4 seconds, and left foot is cool. Incision to left hip healing: dry, almost completely approximated, pink. Denied left hip pain when awake last night, and she appeared comfortable as well.

## 2019-04-15 PROCEDURE — 97530 THERAPEUTIC ACTIVITIES: CPT

## 2019-04-15 PROCEDURE — 700102 HCHG RX REV CODE 250 W/ 637 OVERRIDE(OP): Performed by: INTERNAL MEDICINE

## 2019-04-15 PROCEDURE — 700112 HCHG RX REV CODE 229: Performed by: ORTHOPAEDIC SURGERY

## 2019-04-15 PROCEDURE — A9270 NON-COVERED ITEM OR SERVICE: HCPCS | Performed by: ORTHOPAEDIC SURGERY

## 2019-04-15 PROCEDURE — A9270 NON-COVERED ITEM OR SERVICE: HCPCS | Performed by: INTERNAL MEDICINE

## 2019-04-15 PROCEDURE — 700111 HCHG RX REV CODE 636 W/ 250 OVERRIDE (IP): Performed by: FAMILY MEDICINE

## 2019-04-15 PROCEDURE — 700102 HCHG RX REV CODE 250 W/ 637 OVERRIDE(OP): Performed by: ORTHOPAEDIC SURGERY

## 2019-04-15 PROCEDURE — A9270 NON-COVERED ITEM OR SERVICE: HCPCS | Performed by: HOSPITALIST

## 2019-04-15 PROCEDURE — 700102 HCHG RX REV CODE 250 W/ 637 OVERRIDE(OP): Performed by: HOSPITALIST

## 2019-04-15 PROCEDURE — 92526 ORAL FUNCTION THERAPY: CPT

## 2019-04-15 PROCEDURE — 770006 HCHG ROOM/CARE - MED/SURG/GYN SEMI*

## 2019-04-15 PROCEDURE — 99231 SBSQ HOSP IP/OBS SF/LOW 25: CPT | Performed by: HOSPITALIST

## 2019-04-15 RX ADMIN — METOPROLOL TARTRATE 12.5 MG: 25 TABLET, FILM COATED ORAL at 17:43

## 2019-04-15 RX ADMIN — HEPARIN SODIUM 5000 UNITS: 5000 INJECTION, SOLUTION INTRAVENOUS; SUBCUTANEOUS at 05:33

## 2019-04-15 RX ADMIN — DOCUSATE SODIUM 100 MG: 100 CAPSULE, LIQUID FILLED ORAL at 05:33

## 2019-04-15 RX ADMIN — RISPERIDONE 0.5 MG: 0.5 TABLET, FILM COATED ORAL at 05:33

## 2019-04-15 RX ADMIN — METOPROLOL TARTRATE 12.5 MG: 25 TABLET, FILM COATED ORAL at 05:33

## 2019-04-15 RX ADMIN — RISPERIDONE 0.5 MG: 0.5 TABLET, FILM COATED ORAL at 17:44

## 2019-04-15 RX ADMIN — HEPARIN SODIUM 5000 UNITS: 5000 INJECTION, SOLUTION INTRAVENOUS; SUBCUTANEOUS at 14:55

## 2019-04-15 RX ADMIN — SENNOSIDES AND DOCUSATE SODIUM 1 TABLET: 8.6; 5 TABLET ORAL at 20:56

## 2019-04-15 RX ADMIN — HEPARIN SODIUM 5000 UNITS: 5000 INJECTION, SOLUTION INTRAVENOUS; SUBCUTANEOUS at 22:00

## 2019-04-15 ASSESSMENT — PAIN SCALES - WONG BAKER: WONGBAKER_NUMERICALRESPONSE: DOESN'T HURT AT ALL

## 2019-04-15 ASSESSMENT — GAIT ASSESSMENTS
GAIT LEVEL OF ASSIST: MODERATE ASSIST
DEVIATION: DECREASED BASE OF SUPPORT
DISTANCE (FEET): 10
ASSISTIVE DEVICE: FRONT WHEEL WALKER

## 2019-04-15 ASSESSMENT — COGNITIVE AND FUNCTIONAL STATUS - GENERAL
STANDING UP FROM CHAIR USING ARMS: A LITTLE
DRESSING REGULAR UPPER BODY CLOTHING: A LITTLE
WALKING IN HOSPITAL ROOM: A LOT
TURNING FROM BACK TO SIDE WHILE IN FLAT BAD: UNABLE
PERSONAL GROOMING: A LITTLE
SUGGESTED CMS G CODE MODIFIER DAILY ACTIVITY: CK
MOVING TO AND FROM BED TO CHAIR: UNABLE
CLIMB 3 TO 5 STEPS WITH RAILING: TOTAL
TOILETING: A LOT
DRESSING REGULAR LOWER BODY CLOTHING: A LOT
DAILY ACTIVITIY SCORE: 16
MOBILITY SCORE: 9
MOVING FROM LYING ON BACK TO SITTING ON SIDE OF FLAT BED: UNABLE
HELP NEEDED FOR BATHING: A LOT
SUGGESTED CMS G CODE MODIFIER MOBILITY: CM

## 2019-04-15 NOTE — THERAPY
"Speech Language Therapy dysphagia treatment completed.   Functional Status:  Pt seen on this date for dysphagia therapy. Pt asleep upon entry and required max cues to maintain alertness and participate in therapy. PO trials of NTL via teaspoon and cup sip and purees were presented to the pt and wet voice following PO and audible upper airway congestion noted, which may be concerning for aspiration. Upon palpation, laryngeal elevation was weak and pt with delayed swallow trigger with all PO trials up to 10 seconds. No advanced textures were trials 2/2 lethargy. Laryngeal elevation exercises taught to pt and she completed 10 reps with \"fair \"accuracy and max cueing. At this time, would recommend continuation of dysphagia I/NTL despite risk with 1:1 direct supervision from RN or CNA during meal times. Recommend pt up in chair for meals. SLP following.    Recommendations: continuation of dysphagia I/NTL despite risk, up in chair for meals   Plan of Care: Will benefit from Speech Therapy 3 times per week  Post-Acute Therapy: Recommend inpatient transitional care services for continued speech therapy services.        See \"Rehab Therapy-Acute\" Patient Summary Report for complete documentation.     "

## 2019-04-15 NOTE — THERAPY
"Physical Therapy Treatment completed.   Bed Mobility:  Supine to Sit: Maximal Assist  Transfers: Sit to Stand: Minimal Assist  Gait: Level Of Assist: Moderate Assist with Front-Wheel Walker       Plan of Care: Will benefit from Physical Therapy 4 times per week  Discharge Recommendations: Equipment: Will Continue to Assess for Equipment Needs. Post-acute therapy Recommend inpatient transitional care services for continued physical therapy services.      Pt agreeable to therapy. Very chatty today and followed all cues well. Significant scissoring gait pattern which further impaired balance. Unable to manage FWW without physical assist from therapist. Continue as per POC. Continue to recommend post acute placement.     See \"Rehab Therapy-Acute\" Patient Summary Report for complete documentation.       "

## 2019-04-15 NOTE — CARE PLAN
Problem: Safety  Goal: Will remain free from injury  Outcome: PROGRESSING AS EXPECTED  Provided assistance with mobility. Fall prevention measures in place. rounds ongoing. Bed alarm in place      Problem: Venous Thromboembolism (VTW)/Deep Vein Thrombosis (DVT) Prevention:  Goal: Patient will participate in Venous Thrombosis (VTE)/Deep Vein Thrombosis (DVT)Prevention Measures  Outcome: PROGRESSING AS EXPECTED  Pharmacologic prophylaxis given as ordered. Educated on the use of SCDs and importance of mobility for DVT prevention.

## 2019-04-15 NOTE — THERAPY
"Occupational Therapy Treatment completed with focus on ADLs and ADL transfers.  Functional Status:  Mod/max A with ADls and txfs  Plan of Care: Will benefit from Occupational Therapy 3 times per week  Discharge Recommendations:  Equipment Will Continue to Assess for Equipment Needs. Post-acute therapy Discharge to a transitional care facility for continued therapy services.    See \"Rehab Therapy-Acute\" Patient Summary Report for complete documentation.   "

## 2019-04-16 PROCEDURE — 700102 HCHG RX REV CODE 250 W/ 637 OVERRIDE(OP): Performed by: HOSPITALIST

## 2019-04-16 PROCEDURE — 700102 HCHG RX REV CODE 250 W/ 637 OVERRIDE(OP): Performed by: ORTHOPAEDIC SURGERY

## 2019-04-16 PROCEDURE — 99231 SBSQ HOSP IP/OBS SF/LOW 25: CPT | Performed by: HOSPITALIST

## 2019-04-16 PROCEDURE — 770006 HCHG ROOM/CARE - MED/SURG/GYN SEMI*

## 2019-04-16 PROCEDURE — A9270 NON-COVERED ITEM OR SERVICE: HCPCS | Performed by: HOSPITALIST

## 2019-04-16 PROCEDURE — A9270 NON-COVERED ITEM OR SERVICE: HCPCS | Performed by: ORTHOPAEDIC SURGERY

## 2019-04-16 PROCEDURE — 700112 HCHG RX REV CODE 229: Performed by: ORTHOPAEDIC SURGERY

## 2019-04-16 PROCEDURE — A9270 NON-COVERED ITEM OR SERVICE: HCPCS | Performed by: INTERNAL MEDICINE

## 2019-04-16 PROCEDURE — 700111 HCHG RX REV CODE 636 W/ 250 OVERRIDE (IP): Performed by: FAMILY MEDICINE

## 2019-04-16 PROCEDURE — 700102 HCHG RX REV CODE 250 W/ 637 OVERRIDE(OP): Performed by: INTERNAL MEDICINE

## 2019-04-16 RX ADMIN — METOPROLOL TARTRATE 12.5 MG: 25 TABLET, FILM COATED ORAL at 06:11

## 2019-04-16 RX ADMIN — DOCUSATE SODIUM 100 MG: 100 CAPSULE, LIQUID FILLED ORAL at 06:11

## 2019-04-16 RX ADMIN — QUETIAPINE FUMARATE 50 MG: 25 TABLET ORAL at 20:10

## 2019-04-16 RX ADMIN — RISPERIDONE 0.5 MG: 0.5 TABLET, FILM COATED ORAL at 17:28

## 2019-04-16 RX ADMIN — SENNOSIDES AND DOCUSATE SODIUM 1 TABLET: 8.6; 5 TABLET ORAL at 20:10

## 2019-04-16 RX ADMIN — QUETIAPINE FUMARATE 50 MG: 25 TABLET ORAL at 11:37

## 2019-04-16 RX ADMIN — RISPERIDONE 0.5 MG: 0.5 TABLET, FILM COATED ORAL at 06:12

## 2019-04-16 RX ADMIN — METOPROLOL TARTRATE 12.5 MG: 25 TABLET, FILM COATED ORAL at 17:28

## 2019-04-16 RX ADMIN — HEPARIN SODIUM 5000 UNITS: 5000 INJECTION, SOLUTION INTRAVENOUS; SUBCUTANEOUS at 14:52

## 2019-04-16 RX ADMIN — QUETIAPINE FUMARATE 50 MG: 25 TABLET ORAL at 01:41

## 2019-04-16 RX ADMIN — DOCUSATE SODIUM 100 MG: 100 CAPSULE, LIQUID FILLED ORAL at 17:27

## 2019-04-16 NOTE — CARE PLAN
Problem: Safety  Goal: Will remain free from falls  Outcome: PROGRESSING AS EXPECTED  Pt confused, tries to climb out of bed at times. Bed alarm set, call light within reach, room next to nurse's station, frequent checks.    Problem: Skin Integrity  Goal: Risk for impaired skin integrity will decrease  Outcome: PROGRESSING AS EXPECTED  Q2h turns, heel float boots on, frequent incontinence care.

## 2019-04-16 NOTE — PROGRESS NOTES
[facility] Medicine Daily Progress Note    Date of Service  4/16/2019    Chief Complaint  89 y.o. female admitted 3/25/2019 with left femoral neck fracture after ground level fall.    Hospital Course    89 y.o. female who presented 3/25/2019 with past medical history of Parkinson's disease, dementia, osteoporosis, paroxysmal atrial fibrillation, admitted with fall/syncope apparently patient was found on the floor by caretaker. EMS found patient in Afib/RVR. Due to concern for possible sepsis patient had pan CT that showed left-sided neck femoral fracture but no other acute findings, patient received broad-spectrum antibiotics. Hip fracture was surgically repaired on 3/26 with Dr. Brian. She remains confused at her baseline.        Interval Problem Update  4/16 - calm and comfortable. Family at bedside. No acute changes today. Discussed for discharge but nothing yet.    4/15 - pleasant, forgetful, no acute changes today though. Discussed with SW/FRANCISCO.     4/14 - she is alert and confused, thinking she is in [employer] ([place] did tell her it was [place]). She asks if I could turn down the TV so as to not bother the other congregants.     4/13 - she is asleep but arousable. Comfortable and confused but pleasant. She denies pain    Consultants/Specialty  Orthopedic surgery  Palliative care    Code Status  DNR/DNI    Disposition  snf when able    Review of Systems  Review of Systems   Unable to perform ROS: Dementia        Physical Exam  Temp:  [36.3 °C (97.3 °F)-36.9 °C (98.4 °F)] 36.5 °C (97.7 °F)  Pulse:  [67-73] 67  Resp:  [15-19] 16  BP: (111-162)/(53-67) 162/59  SpO2:  [94 %-99 %] 99 %    Physical Exam   HENT:   Mouth/Throat: No oropharyngeal exudate.   Eyes: Pupils are equal, round, and reactive to light. Conjunctivae are normal. No scleral icterus.   Neck: No JVD present. No tracheal deviation present.   Cardiovascular: Normal rate and regular rhythm.    No murmur heard.  Pulmonary/Chest: Effort normal and breath  sounds normal. No respiratory distress.   Abdominal: Soft. She exhibits no distension. There is no tenderness.   Musculoskeletal: She exhibits no edema.   Left hip wound minimally tender   Neurological: She is alert.   Skin: Skin is warm and dry. She is not diaphoretic. No erythema.   Psychiatric: Cognition and memory are impaired. She expresses impulsivity.   Nursing note and vitals reviewed.  4/16 - no changes in exam today    Fluids    Intake/Output Summary (Last 24 hours) at 04/16/19 1204  Last data filed at 04/15/19 2100   Gross per 24 hour   Intake              120 ml   Output                0 ml   Net              120 ml                              Assessment/Plan  * Closed fracture of neck of left femur (HCC)- (present on admission)   Assessment & Plan    After a fall, had CT hip showing Acute left femoral neck fracture   Patient underwent left hemiarthroplasty on 3/26/2019.  PT/OT patient moderate assist for walker use and ambulation  Placement pending   Staples removed 4/9 and steri strips placed       Dysphagia- (present on admission)   Assessment & Plan    Initially failed swallow evaluation.  Per POA, do not place a tube feeding.  Speech therapy, continue pureed diet     Late onset Alzheimer's disease without behavioral disturbance- (present on admission)   Assessment & Plan     low dose Risperdal.      Moderate protein-calorie malnutrition (HCC)- (present on admission)   Assessment & Plan    Encourage oral intake with puree diet, no tube feeds per POA request     Paroxysmal atrial fibrillation (HCC)- (present on admission)   Assessment & Plan    Patient was found to be on A. fib with RVR, echocardiogram indicate LV EF of 75% and mild MR and TR.  Continue on low-dose metoprolol hold for low blood pressure     Normocytic anemia- (present on admission)   Assessment & Plan    Stable hemoglobin level     Hypokalemia- (present on admission)   Assessment & Plan    Replaced        Hypercholesterolemia-  (present on admission)   Assessment & Plan    No evidence for statin therapy at this age      4/16 - no change in plan     VTE prophylaxis: heparin

## 2019-04-16 NOTE — CARE PLAN
Problem: Safety  Goal: Will remain free from falls  Patient's room is located near nurses station.  Bed alarm is on.  Frequent rounding.  Charting done outside patient's room.

## 2019-04-16 NOTE — CARE PLAN
Problem: Pain Management  Goal: Pain level will decrease to patient's comfort goal  Patient has had no complaints of pain.

## 2019-04-16 NOTE — CARE PLAN
Problem: Pain Management  Goal: Pain level will decrease to patient's comfort goal    Intervention: Follow pain managment plan developed in collaboration with patient and Interdisciplinary Team  Pt denies pain at this time

## 2019-04-16 NOTE — CARE PLAN
Problem: Safety  Goal: Will remain free from injury    Intervention: Provide assistance with mobility  Call bell within reach. Pt reoriented to room. Bed alarm on. Will continue to monitor.

## 2019-04-16 NOTE — PROGRESS NOTES
[facility] Medicine Daily Progress Note    Date of Service  4/15/2019    Chief Complaint  89 y.o. female admitted 3/25/2019 with left femoral neck fracture after ground level fall.    Hospital Course    89 y.o. female who presented 3/25/2019 with past medical history of Parkinson's disease, dementia, osteoporosis, paroxysmal atrial fibrillation, admitted with fall/syncope apparently patient was found on the floor by caretaker. EMS found patient in Afib/RVR. Due to concern for possible sepsis patient had pan CT that showed left-sided neck femoral fracture but no other acute findings, patient received broad-spectrum antibiotics. Hip fracture was surgically repaired on 3/26 with Dr. Brian. She remains confused at her baseline.        Interval Problem Update  4/15 - pleasant, forgetful, no acute changes today though. Discussed with SW/FRANCISCO.     4/14 - she is alert and confused, thinking she is in [employer] ([place] did tell her it was [place]). She asks if I could turn down the TV so as to not bother the other congregants.     4/13 - she is asleep but arousable. Comfortable and confused but pleasant. She denies pain    Consultants/Specialty  Orthopedic surgery  Palliative care    Code Status  DNR/DNI    Disposition  snf when able    Review of Systems  Review of Systems   Unable to perform ROS: Dementia        Physical Exam  Temp:  [36.1 °C (97 °F)-36.8 °C (98.2 °F)] 36.4 °C (97.5 °F)  Pulse:  [61-97] 73  Resp:  [15-19] 19  BP: (106-134)/(53-76) 111/53  SpO2:  [92 %-94 %] 94 %    Physical Exam   HENT:   Mouth/Throat: No oropharyngeal exudate.   Eyes: Pupils are equal, round, and reactive to light. Conjunctivae are normal. No scleral icterus.   Neck: No JVD present. No tracheal deviation present.   Cardiovascular: Normal rate and regular rhythm.    No murmur heard.  Pulmonary/Chest: Effort normal and breath sounds normal. No respiratory distress.   Abdominal: Soft. She exhibits no distension. There is no tenderness.    Musculoskeletal: She exhibits no edema.   Left hip wound minimally tender   Neurological: She is alert.   Skin: Skin is warm and dry. She is not diaphoretic. No erythema.   Psychiatric: Cognition and memory are impaired. She expresses impulsivity.   Nursing note and vitals reviewed.      Fluids    Intake/Output Summary (Last 24 hours) at 04/15/19 1728  Last data filed at 04/15/19 0500   Gross per 24 hour   Intake              150 ml   Output                0 ml   Net              150 ml                              Assessment/Plan  * Closed fracture of neck of left femur (HCC)- (present on admission)   Assessment & Plan    After a fall, had CT hip showing Acute left femoral neck fracture   Patient underwent left hemiarthroplasty on 3/26/2019.  PT/OT patient moderate assist for walker use and ambulation  Placement pending   Staples removed 4/9 and steri strips placed       Dysphagia- (present on admission)   Assessment & Plan    Initially failed swallow evaluation.  Per POA, do not place a tube feeding.  Speech therapy, continue pureed diet     Late onset Alzheimer's disease without behavioral disturbance- (present on admission)   Assessment & Plan     low dose Risperdal.      Moderate protein-calorie malnutrition (HCC)- (present on admission)   Assessment & Plan    Encourage oral intake with puree diet, no tube feeds per POA request     Paroxysmal atrial fibrillation (HCC)- (present on admission)   Assessment & Plan    Patient was found to be on A. fib with RVR, echocardiogram indicate LV EF of 75% and mild MR and TR.  Continue on low-dose metoprolol hold for low blood pressure     Normocytic anemia- (present on admission)   Assessment & Plan    Stable hemoglobin level     Hypokalemia- (present on admission)   Assessment & Plan    Replaced        Hypercholesterolemia- (present on admission)   Assessment & Plan    No evidence for statin therapy at this age      4/15 - no change in plan     VTE prophylaxis:  heparin

## 2019-04-17 PROCEDURE — A9270 NON-COVERED ITEM OR SERVICE: HCPCS | Performed by: ORTHOPAEDIC SURGERY

## 2019-04-17 PROCEDURE — 700102 HCHG RX REV CODE 250 W/ 637 OVERRIDE(OP): Performed by: INTERNAL MEDICINE

## 2019-04-17 PROCEDURE — 700102 HCHG RX REV CODE 250 W/ 637 OVERRIDE(OP): Performed by: HOSPITALIST

## 2019-04-17 PROCEDURE — 700102 HCHG RX REV CODE 250 W/ 637 OVERRIDE(OP): Performed by: ORTHOPAEDIC SURGERY

## 2019-04-17 PROCEDURE — 770006 HCHG ROOM/CARE - MED/SURG/GYN SEMI*

## 2019-04-17 PROCEDURE — A9270 NON-COVERED ITEM OR SERVICE: HCPCS | Performed by: INTERNAL MEDICINE

## 2019-04-17 PROCEDURE — 700111 HCHG RX REV CODE 636 W/ 250 OVERRIDE (IP): Performed by: FAMILY MEDICINE

## 2019-04-17 PROCEDURE — A9270 NON-COVERED ITEM OR SERVICE: HCPCS | Performed by: HOSPITALIST

## 2019-04-17 PROCEDURE — 99232 SBSQ HOSP IP/OBS MODERATE 35: CPT | Performed by: HOSPITALIST

## 2019-04-17 RX ADMIN — RISPERIDONE 0.5 MG: 0.5 TABLET, FILM COATED ORAL at 21:09

## 2019-04-17 RX ADMIN — METOPROLOL TARTRATE 12.5 MG: 25 TABLET, FILM COATED ORAL at 21:09

## 2019-04-17 RX ADMIN — SENNOSIDES AND DOCUSATE SODIUM 1 TABLET: 8.6; 5 TABLET ORAL at 21:11

## 2019-04-17 RX ADMIN — HEPARIN SODIUM 5000 UNITS: 5000 INJECTION, SOLUTION INTRAVENOUS; SUBCUTANEOUS at 06:56

## 2019-04-17 RX ADMIN — HEPARIN SODIUM 5000 UNITS: 5000 INJECTION, SOLUTION INTRAVENOUS; SUBCUTANEOUS at 14:56

## 2019-04-17 RX ADMIN — HEPARIN SODIUM 5000 UNITS: 5000 INJECTION, SOLUTION INTRAVENOUS; SUBCUTANEOUS at 21:10

## 2019-04-17 ASSESSMENT — PAIN SCALES - PAIN ASSESSMENT IN ADVANCED DEMENTIA (PAINAD)
BODYLANGUAGE: RELAXED
TOTALSCORE: 0
FACIALEXPRESSION: SMILING OR INEXPRESSIVE
CONSOLABILITY: NO NEED TO CONSOLE
BREATHING: NORMAL
BODYLANGUAGE: RELAXED
CONSOLABILITY: NO NEED TO CONSOLE
TOTALSCORE: 0
BREATHING: NORMAL
FACIALEXPRESSION: SMILING OR INEXPRESSIVE

## 2019-04-17 NOTE — CARE PLAN
Problem: Skin Integrity  Goal: Risk for impaired skin integrity will decrease  Patient on a waffle mattress.  Pillows in use for support.  Medicated wipes in use.  Patient repositioned every two hours.

## 2019-04-17 NOTE — CARE PLAN
Problem: Psychosocial Needs:  Goal: Level of anxiety will decrease  Patient has moments of agitation and aggression.  Seroquel given.

## 2019-04-17 NOTE — THERAPY
Attempted therapy follow up session this pm. Pt continues to be heavily sleeping but was able to engage in conversation. Reporting and declining to participate or get to chair at this time for lunch. Provided encouragement as she had not been out of bed today but pt proceeded to close eyes and request to sleep. Will continue to follow up as appropriate.

## 2019-04-17 NOTE — PROGRESS NOTES
[facility] Medicine Daily Progress Note    Date of Service  4/17/2019    Chief Complaint  89 y.o. female admitted 3/25/2019 with left femoral neck fracture after ground level fall.    Hospital Course    89 y.o. female who presented 3/25/2019 with past medical history of Parkinson's disease, dementia, osteoporosis, paroxysmal atrial fibrillation, admitted with fall/syncope apparently patient was found on the floor by caretaker. EMS found patient in Afib/RVR. Due to concern for possible sepsis patient had pan CT that showed left-sided neck femoral fracture but no other acute findings, patient received broad-spectrum antibiotics. Hip fracture was surgically repaired on 3/26 with Dr. Brian. She remains confused at her baseline.        Interval Problem Update  4/17 - discussed with nursing and therapies. She is still about the same. Today she is sleepy but arousable. She mostly sleeps. No questions today.     4/16 - calm and comfortable. Family at bedside. No acute changes today. Discussed for discharge but nothing yet.    4/15 - pleasant, forgetful, no acute changes today though. Discussed with SW/CM.     4/14 - she is alert and confused, thinking she is in [employer] (I did tell her it was [place]). She asks if I could turn down the TV so as to not bother the other congregants.     4/13 - she is asleep but arousable. Comfortable and confused but pleasant. She denies pain    Consultants/Specialty  Orthopedic surgery  Palliative care    Code Status  DNR/DNI    Disposition  snf when able    Review of Systems  Review of Systems   Unable to perform ROS: Dementia        Physical Exam  Temp:  [36.2 °C (97.2 °F)-36.6 °C (97.9 °F)] 36.2 °C (97.2 °F)  Pulse:  [67-72] 69  Resp:  [15-17] 15  BP: (137-168)/(51-65) 137/53  SpO2:  [90 %-98 %] 90 %    Physical Exam   HENT:   Mouth/Throat: No oropharyngeal exudate.   Eyes: Pupils are equal, round, and reactive to light. Conjunctivae are normal. No scleral icterus.   Neck: No JVD  present. No tracheal deviation present.   Cardiovascular: Normal rate and regular rhythm.    No murmur heard.  Pulmonary/Chest: Effort normal and breath sounds normal. No respiratory distress.   Abdominal: Soft. She exhibits no distension. There is no tenderness.   Musculoskeletal: She exhibits no edema.   Left hip wound minimally tender   Neurological: She is alert.   Skin: Skin is warm and dry. She is not diaphoretic. No erythema.   Psychiatric: Cognition and memory are impaired. She expresses impulsivity.   Nursing note and vitals reviewed.  4/17 - no changes in exam today, somnolent    Fluids    Intake/Output Summary (Last 24 hours) at 04/17/19 0831  Last data filed at 04/16/19 2000   Gross per 24 hour   Intake              420 ml   Output                0 ml   Net              420 ml                              Assessment/Plan  * Closed fracture of neck of left femur (HCC)- (present on admission)   Assessment & Plan    After a fall, had CT hip showing Acute left femoral neck fracture   Patient underwent left hemiarthroplasty on 3/26/2019.  PT/OT patient moderate assist for walker use and ambulation  Placement pending   Staples removed 4/9 and steri strips placed       Dysphagia- (present on admission)   Assessment & Plan    Initially failed swallow evaluation.  Per POA, do not place a tube feeding.  Speech therapy, continue pureed diet     Late onset Alzheimer's disease without behavioral disturbance- (present on admission)   Assessment & Plan     low dose Risperdal.      Moderate protein-calorie malnutrition (HCC)- (present on admission)   Assessment & Plan    Encourage oral intake with puree diet, no tube feeds per POA request     Paroxysmal atrial fibrillation (HCC)- (present on admission)   Assessment & Plan    Patient was found to be on A. fib with RVR, echocardiogram indicate LV EF of 75% and mild MR and TR.  Continue on low-dose metoprolol hold for low blood pressure     Normocytic anemia- (present on  admission)   Assessment & Plan    Stable hemoglobin level     Hypokalemia- (present on admission)   Assessment & Plan    Replaced        Hypercholesterolemia- (present on admission)   Assessment & Plan    No evidence for statin therapy at this age      4/17 - no change in plan, still looking for discharge plan      VTE prophylaxis: heparin

## 2019-04-18 PROCEDURE — A9270 NON-COVERED ITEM OR SERVICE: HCPCS | Performed by: INTERNAL MEDICINE

## 2019-04-18 PROCEDURE — A9270 NON-COVERED ITEM OR SERVICE: HCPCS | Performed by: ORTHOPAEDIC SURGERY

## 2019-04-18 PROCEDURE — 700112 HCHG RX REV CODE 229: Performed by: ORTHOPAEDIC SURGERY

## 2019-04-18 PROCEDURE — 99231 SBSQ HOSP IP/OBS SF/LOW 25: CPT | Performed by: HOSPITALIST

## 2019-04-18 PROCEDURE — 700102 HCHG RX REV CODE 250 W/ 637 OVERRIDE(OP): Performed by: HOSPITALIST

## 2019-04-18 PROCEDURE — A9270 NON-COVERED ITEM OR SERVICE: HCPCS | Performed by: HOSPITALIST

## 2019-04-18 PROCEDURE — 700111 HCHG RX REV CODE 636 W/ 250 OVERRIDE (IP): Performed by: FAMILY MEDICINE

## 2019-04-18 PROCEDURE — 770006 HCHG ROOM/CARE - MED/SURG/GYN SEMI*

## 2019-04-18 PROCEDURE — 700102 HCHG RX REV CODE 250 W/ 637 OVERRIDE(OP): Performed by: INTERNAL MEDICINE

## 2019-04-18 PROCEDURE — 700102 HCHG RX REV CODE 250 W/ 637 OVERRIDE(OP): Performed by: ORTHOPAEDIC SURGERY

## 2019-04-18 RX ADMIN — HEPARIN SODIUM 5000 UNITS: 5000 INJECTION, SOLUTION INTRAVENOUS; SUBCUTANEOUS at 15:27

## 2019-04-18 RX ADMIN — SENNOSIDES AND DOCUSATE SODIUM 1 TABLET: 8.6; 5 TABLET ORAL at 21:37

## 2019-04-18 RX ADMIN — RISPERIDONE 0.5 MG: 0.5 TABLET, FILM COATED ORAL at 17:25

## 2019-04-18 RX ADMIN — METOPROLOL TARTRATE 12.5 MG: 25 TABLET, FILM COATED ORAL at 04:51

## 2019-04-18 RX ADMIN — HEPARIN SODIUM 5000 UNITS: 5000 INJECTION, SOLUTION INTRAVENOUS; SUBCUTANEOUS at 04:51

## 2019-04-18 RX ADMIN — RISPERIDONE 0.5 MG: 0.5 TABLET, FILM COATED ORAL at 04:52

## 2019-04-18 RX ADMIN — HEPARIN SODIUM 5000 UNITS: 5000 INJECTION, SOLUTION INTRAVENOUS; SUBCUTANEOUS at 21:37

## 2019-04-18 RX ADMIN — DOCUSATE SODIUM 100 MG: 100 CAPSULE, LIQUID FILLED ORAL at 17:27

## 2019-04-18 RX ADMIN — METOPROLOL TARTRATE 12.5 MG: 25 TABLET, FILM COATED ORAL at 17:26

## 2019-04-18 RX ADMIN — DOCUSATE SODIUM 100 MG: 100 CAPSULE, LIQUID FILLED ORAL at 04:52

## 2019-04-18 ASSESSMENT — PAIN SCALES - PAIN ASSESSMENT IN ADVANCED DEMENTIA (PAINAD)
TOTALSCORE: 0
CONSOLABILITY: NO NEED TO CONSOLE
BREATHING: NORMAL
BODYLANGUAGE: RELAXED
FACIALEXPRESSION: SMILING OR INEXPRESSIVE

## 2019-04-18 ASSESSMENT — PAIN SCALES - WONG BAKER: WONGBAKER_NUMERICALRESPONSE: DOESN'T HURT AT ALL

## 2019-04-18 NOTE — PROGRESS NOTES
· 2 RN skin check complete with TERRY Youssef.  · Devices in place SCDs.  · Skin assessed under devices yes-skin intact.  · Confirmed pressure ulcers found on n/a.  · New potential pressure ulcers noted on n/a.   The following interventions in place: waffle overlay, q2h turns, 2 RN skin check, barrier paste and barrier wipes being used, extra pillows for floating heels and pillows for repositioning.

## 2019-04-18 NOTE — PROGRESS NOTES
Shift report received from night RN, assumed care at 0715. Patient heavily sleeping in bed, routinely medicated prn per MAR. AOx1, Up x1 assist, does not call appropriately, bed alarm  in use. O2 RA, SPO2 93%. VTE SCDs. Plan is provide comfort. Needs met at this time.     Discussed POC for multimodal pain management, monitoring VS/labs/I&O, mobility, day time routine, comfort, and safety. Patient has call light and personal belongings within reach. Safety and fall precautions in place. Reviewed current labs, notes, medications, and orders. Hourly rounding in place with RN and CNA.

## 2019-04-18 NOTE — PROGRESS NOTES
· 2 RN skin check complete with TERRY Elam.  · Devices in place SCDs.  · Skin assessed under devices are intact.  · No pressure ulcers found.  · No new potential pressure ulcers noted.  · The following interventions in place: waffle overlay, Q2h turns, 2 RN skin check, barrier paste and barrier wipes being used, extra pillows for floating heels and pillows for repositioning

## 2019-04-18 NOTE — PROGRESS NOTES
[facility] Medicine Daily Progress Note    Date of Service  4/18/2019    Chief Complaint  89 y.o. female admitted 3/25/2019 with left femoral neck fracture after ground level fall.    Hospital Course    89 y.o. female who presented 3/25/2019 with past medical history of Parkinson's disease, dementia, osteoporosis, paroxysmal atrial fibrillation, admitted with fall/syncope apparently patient was found on the floor by caretaker. EMS found patient in Afib/RVR. Due to concern for possible sepsis patient had pan CT that showed left-sided neck femoral fracture but no other acute findings, patient received broad-spectrum antibiotics. Hip fracture was surgically repaired on 3/26 with Dr. Brian. She remains confused at her baseline.        Interval Problem Update  4/18 - she is calm today and comfortable. Discussed with nursing. No changes.     4/17 - discussed with nursing and therapies. She is still about the same. Today she is sleepy but arousable. She mostly sleeps. No questions today.     4/16 - calm and comfortable. Family at bedside. No acute changes today. Discussed for discharge but nothing yet.    4/15 - pleasant, forgetful, no acute changes today though. Discussed with SW/CM.     4/14 - she is alert and confused, thinking she is in [employer] (I did tell her it was [place]). She asks if I could turn down the TV so as to not bother the other congregants.     4/13 - she is asleep but arousable. Comfortable and confused but pleasant. She denies pain    Consultants/Specialty  Orthopedic surgery  Palliative care    Code Status  DNR/DNI    Disposition  snf when able    Review of Systems  Review of Systems   Unable to perform ROS: Dementia        Physical Exam  Temp:  [36.3 °C (97.3 °F)-36.8 °C (98.2 °F)] 36.3 °C (97.4 °F)  Pulse:  [62-73] 67  Resp:  [15-16] 16  BP: (110-185)/(41-63) 112/41  SpO2:  [90 %-94 %] 93 %    Physical Exam   HENT:   Mouth/Throat: No oropharyngeal exudate.   Eyes: Pupils are equal, round, and  reactive to light. Conjunctivae are normal. No scleral icterus.   Neck: No JVD present. No tracheal deviation present.   Cardiovascular: Normal rate and regular rhythm.    No murmur heard.  Pulmonary/Chest: Effort normal and breath sounds normal. No respiratory distress.   Abdominal: Soft. She exhibits no distension. There is no tenderness.   Musculoskeletal: She exhibits no edema.   Left hip wound minimally tender   Neurological: She is alert.   Skin: Skin is warm and dry. She is not diaphoretic. No erythema.   Psychiatric: Cognition and memory are impaired. She expresses impulsivity.   Nursing note and vitals reviewed.  4/18 - no changes in exam today, somnolent    Fluids    Intake/Output Summary (Last 24 hours) at 04/18/19 1625  Last data filed at 04/17/19 2135   Gross per 24 hour   Intake              120 ml   Output                0 ml   Net              120 ml                              Assessment/Plan  * Closed fracture of neck of left femur (HCC)- (present on admission)   Assessment & Plan    After a fall, had CT hip showing Acute left femoral neck fracture   Patient underwent left hemiarthroplasty on 3/26/2019.  PT/OT patient moderate assist for walker use and ambulation  Placement pending   Staples removed 4/9 and steri strips placed       Dysphagia- (present on admission)   Assessment & Plan    Initially failed swallow evaluation.  Per POA, do not place a tube feeding.  Speech therapy, continue pureed diet     Late onset Alzheimer's disease without behavioral disturbance- (present on admission)   Assessment & Plan     low dose Risperdal.      Moderate protein-calorie malnutrition (HCC)- (present on admission)   Assessment & Plan    Encourage oral intake with puree diet, no tube feeds per POA request     Paroxysmal atrial fibrillation (HCC)- (present on admission)   Assessment & Plan    Patient was found to be on A. fib with RVR, echocardiogram indicate LV EF of 75% and mild MR and TR.  Continue on  low-dose metoprolol hold for low blood pressure     Normocytic anemia- (present on admission)   Assessment & Plan    Stable hemoglobin level     Hypokalemia- (present on admission)   Assessment & Plan    Replaced        Hypercholesterolemia- (present on admission)   Assessment & Plan    No evidence for statin therapy at this age      4/18 - no change in plan, still looking for discharge plan      VTE prophylaxis: heparin

## 2019-04-18 NOTE — CARE PLAN
Problem: Safety  Goal: Will remain free from falls    Intervention: Assess risk factors for falls  Raised bed rails, confirmed bed alarm is on, verified fall risk bracelet on pt, and checked on pt hourly with CNA.

## 2019-04-18 NOTE — CARE PLAN
Problem: Safety  Goal: Will remain free from injury  Outcome: PROGRESSING AS EXPECTED  Fall Precautions in place: Non-skid socks on, bed locked and in lowest position, 3 bed rails up, DME in bathroom, bed alarm on, call light within reach.    Problem: Skin Integrity  Goal: Risk for impaired skin integrity will decrease  Outcome: PROGRESSING AS EXPECTED  interventions implemented: q2h turns, 2 RN skin check, barrier paste and barrier wipes being used, extra pillows for floating heels and pillows for repositioning.

## 2019-04-18 NOTE — CARE PLAN
Problem: Venous Thromboembolism (VTW)/Deep Vein Thrombosis (DVT) Prevention:  Goal: Patient will participate in Venous Thrombosis (VTE)/Deep Vein Thrombosis (DVT)Prevention Measures    Intervention: Ensure patient wears graduated elastic stockings (DELFIN hose) and/or SCDs, if ordered, when in bed or chair (Remove at least once per shift for skin check)  Ensured SCDs were on and properly placed. Assessed underneath SCDs, skin intact.

## 2019-04-18 NOTE — PROGRESS NOTES
· 2 RN skin check complete with TERRY Seay.  · Devices in place SCDs.  · Skin assessed under devices are intact.  · No pressure ulcers found.  · Red, Blanching skin on back and sacrum.   · The following interventions in place Q2 turns, waffle overlay and dry float pads.

## 2019-04-19 PROCEDURE — 700102 HCHG RX REV CODE 250 W/ 637 OVERRIDE(OP): Performed by: HOSPITALIST

## 2019-04-19 PROCEDURE — 700102 HCHG RX REV CODE 250 W/ 637 OVERRIDE(OP): Performed by: INTERNAL MEDICINE

## 2019-04-19 PROCEDURE — 770006 HCHG ROOM/CARE - MED/SURG/GYN SEMI*

## 2019-04-19 PROCEDURE — 700112 HCHG RX REV CODE 229: Performed by: ORTHOPAEDIC SURGERY

## 2019-04-19 PROCEDURE — A9270 NON-COVERED ITEM OR SERVICE: HCPCS | Performed by: INTERNAL MEDICINE

## 2019-04-19 PROCEDURE — 700111 HCHG RX REV CODE 636 W/ 250 OVERRIDE (IP): Performed by: FAMILY MEDICINE

## 2019-04-19 PROCEDURE — 97530 THERAPEUTIC ACTIVITIES: CPT

## 2019-04-19 PROCEDURE — A9270 NON-COVERED ITEM OR SERVICE: HCPCS | Performed by: HOSPITALIST

## 2019-04-19 PROCEDURE — 99231 SBSQ HOSP IP/OBS SF/LOW 25: CPT | Performed by: HOSPITALIST

## 2019-04-19 PROCEDURE — A9270 NON-COVERED ITEM OR SERVICE: HCPCS | Performed by: ORTHOPAEDIC SURGERY

## 2019-04-19 RX ORDER — HALOPERIDOL 2 MG/ML
2 SOLUTION ORAL EVERY 6 HOURS PRN
Status: DISCONTINUED | OUTPATIENT
Start: 2019-04-19 | End: 2019-05-01

## 2019-04-19 RX ORDER — HALOPERIDOL 1 MG/1
2 TABLET ORAL EVERY 6 HOURS PRN
Status: DISCONTINUED | OUTPATIENT
Start: 2019-04-19 | End: 2019-04-19

## 2019-04-19 RX ORDER — HALOPERIDOL 5 MG/ML
1 INJECTION INTRAMUSCULAR EVERY 6 HOURS PRN
Status: DISCONTINUED | OUTPATIENT
Start: 2019-04-19 | End: 2019-05-01

## 2019-04-19 RX ADMIN — RISPERIDONE 0.5 MG: 0.5 TABLET, FILM COATED ORAL at 05:01

## 2019-04-19 RX ADMIN — DOCUSATE SODIUM 100 MG: 100 CAPSULE, LIQUID FILLED ORAL at 05:01

## 2019-04-19 RX ADMIN — HEPARIN SODIUM 5000 UNITS: 5000 INJECTION, SOLUTION INTRAVENOUS; SUBCUTANEOUS at 22:00

## 2019-04-19 RX ADMIN — RISPERIDONE 0.5 MG: 0.5 TABLET, FILM COATED ORAL at 17:19

## 2019-04-19 RX ADMIN — HEPARIN SODIUM 5000 UNITS: 5000 INJECTION, SOLUTION INTRAVENOUS; SUBCUTANEOUS at 05:01

## 2019-04-19 RX ADMIN — METOPROLOL TARTRATE 12.5 MG: 25 TABLET, FILM COATED ORAL at 17:19

## 2019-04-19 RX ADMIN — METOPROLOL TARTRATE 12.5 MG: 25 TABLET, FILM COATED ORAL at 05:01

## 2019-04-19 RX ADMIN — DOCUSATE SODIUM 100 MG: 100 CAPSULE, LIQUID FILLED ORAL at 17:19

## 2019-04-19 RX ADMIN — QUETIAPINE FUMARATE 50 MG: 25 TABLET ORAL at 17:19

## 2019-04-19 ASSESSMENT — PAIN SCALES - PAIN ASSESSMENT IN ADVANCED DEMENTIA (PAINAD)
BREATHING: NORMAL
FACIALEXPRESSION: SMILING OR INEXPRESSIVE
TOTALSCORE: 0
CONSOLABILITY: NO NEED TO CONSOLE
BODYLANGUAGE: RELAXED

## 2019-04-19 ASSESSMENT — COGNITIVE AND FUNCTIONAL STATUS - GENERAL
SUGGESTED CMS G CODE MODIFIER MOBILITY: CM
MOVING FROM LYING ON BACK TO SITTING ON SIDE OF FLAT BED: UNABLE
STANDING UP FROM CHAIR USING ARMS: A LOT
MOBILITY SCORE: 8
WALKING IN HOSPITAL ROOM: A LOT
MOVING TO AND FROM BED TO CHAIR: UNABLE
TURNING FROM BACK TO SIDE WHILE IN FLAT BAD: UNABLE
CLIMB 3 TO 5 STEPS WITH RAILING: TOTAL

## 2019-04-19 ASSESSMENT — GAIT ASSESSMENTS
DEVIATION: DECREASED BASE OF SUPPORT;BRADYKINETIC;SHUFFLED GAIT
GAIT LEVEL OF ASSIST: MODERATE ASSIST
ASSISTIVE DEVICE: FRONT WHEEL WALKER
DISTANCE (FEET): 12

## 2019-04-19 NOTE — PROGRESS NOTES
[facility] Medicine Daily Progress Note    Date of Service  4/19/2019    Chief Complaint  89 y.o. female admitted 3/25/2019 with left femoral neck fracture after ground level fall.    Hospital Course    89 y.o. female who presented 3/25/2019 with past medical history of Parkinson's disease, dementia, osteoporosis, paroxysmal atrial fibrillation, admitted with fall/syncope apparently patient was found on the floor by caretaker. EMS found patient in Afib/RVR. Due to concern for possible sepsis patient had pan CT that showed left-sided neck femoral fracture but no other acute findings, patient received broad-spectrum antibiotics. Hip fracture was surgically repaired on 3/26 with Dr. Brian. She remains confused at her baseline.        Interval Problem Update  4/19 - patient much more alert today and trying to get out of bed. She tells me she's not sure where she wants to go, though. Redirected.     4/18 - she is calm today and comfortable. Discussed with nursing. No changes.     4/17 - discussed with nursing and therapies. She is still about the same. Today she is sleepy but arousable. She mostly sleeps. No questions today.     4/16 - calm and comfortable. Family at bedside. No acute changes today. Discussed for discharge but nothing yet.    4/15 - pleasant, forgetful, no acute changes today though. Discussed with SW/CM.     4/14 - she is alert and confused, thinking she is in [employer] (I did tell her it was [place]). She asks if I could turn down the TV so as to not bother the other congregants.     4/13 - she is asleep but arousable. Comfortable and confused but pleasant. She denies pain    Consultants/Specialty  Orthopedic surgery  Palliative care    Code Status  DNR/DNI    Disposition  snf when able    Review of Systems  Review of Systems   Unable to perform ROS: Dementia        Physical Exam  Temp:  [36.6 °C (97.9 °F)-36.8 °C (98.3 °F)] 36.7 °C (98.1 °F)  Pulse:  [67-77] 67  Resp:  [15-16] 15  BP:  (116-137)/(49-70) 137/59  SpO2:  [92 %-96 %] 94 %    Physical Exam   HENT:   Mouth/Throat: No oropharyngeal exudate.   Eyes: Pupils are equal, round, and reactive to light. Conjunctivae are normal. No scleral icterus.   Neck: No JVD present. No tracheal deviation present.   Cardiovascular: Normal rate and regular rhythm.    No murmur heard.  Pulmonary/Chest: Effort normal and breath sounds normal. No respiratory distress.   Abdominal: Soft. She exhibits no distension. There is no tenderness.   Musculoskeletal: She exhibits no edema.   Left hip wound minimally tender   Neurological: She is alert.   Skin: Skin is warm and dry. She is not diaphoretic. No erythema.   Psychiatric: Cognition and memory are impaired. She expresses impulsivity.   Nursing note and vitals reviewed.  4/19 - no changes in exam today, alert and feisty    Fluids  No intake or output data in the 24 hours ending 04/19/19 1556                           Assessment/Plan  * Closed fracture of neck of left femur (HCC)- (present on admission)   Assessment & Plan    After a fall, had CT hip showing Acute left femoral neck fracture   Patient underwent left hemiarthroplasty on 3/26/2019.  PT/OT patient moderate assist for walker use and ambulation  Placement pending   Staples removed 4/9 and steri strips placed       Dysphagia- (present on admission)   Assessment & Plan    Initially failed swallow evaluation.  Per POA, do not place a tube feeding.  Speech therapy, continue pureed diet     Late onset Alzheimer's disease without behavioral disturbance- (present on admission)   Assessment & Plan     low dose Risperdal.      Moderate protein-calorie malnutrition (HCC)- (present on admission)   Assessment & Plan    Encourage oral intake with puree diet, no tube feeds per POA request     Paroxysmal atrial fibrillation (HCC)- (present on admission)   Assessment & Plan    Patient was found to be on A. fib with RVR, echocardiogram indicate LV EF of 75% and mild MR  and TR.  Continue on low-dose metoprolol hold for low blood pressure     Normocytic anemia- (present on admission)   Assessment & Plan    Stable hemoglobin level     Hypokalemia- (present on admission)   Assessment & Plan    Replaced        Hypercholesterolemia- (present on admission)   Assessment & Plan    No evidence for statin therapy at this age      4/19 - no change in plan, still looking for discharge plan      VTE prophylaxis: heparin

## 2019-04-19 NOTE — DIETARY
"Nutrition Services: Weekly Rescreen   Day 25 of admit.  Rosalba Wagner is a 89 y.o. female with admitting DX of A-fib and femur fracture.    Pt is currently on a regular, dysphagia 1, nectar thick diet with 1:1 supervision.  Per chart, pt with variable PO intake/limited documentation.  RD spoke with RN regarding PO intake and Boost Plus intake.  RN reports pt consumed 50% of breakfast this morning and thus far 0% of lunch.  RN notes that pt drinks all of her Boost VHC, her \"go-to drink,\" which is being provided to her TID.  Boost VHC TID provides 1590 kcal and 66 gm protein, if 100% is consumed.  Per RD assessment, pt requires a minimum of 932 kcal/day and 45 gm protein.  Pt is meeting her estimated nutritional needs at this time.      Malnutrition Risk: N/A.     Recommendations/Plan:  1. Continue encouraging Boost VHC TID.   2. Encourage intake of meals and supplements.   3. Document intake of all meals and supplements as % taken in ADL's to provide interdisciplinary communication across all shifts.   4. Monitor weight.  5. Nutrition rep will continue to see patient for ongoing meal and snack preferences.    Please consult RD as needed, especially if pt starts refusing Boost.     "

## 2019-04-19 NOTE — THERAPY
"Physical Therapy Treatment completed.   Bed Mobility:  Supine to Sit: Minimal Assist  Transfers: Sit to Stand: Minimal Assist  Gait: Level Of Assist: Moderate Assist with Front-Wheel Walker 12ft       Plan of Care: Will benefit from Physical Therapy 4 times per week  Discharge Recommendations: Equipment: Will Continue to Assess for Equipment Needs. Post-acute therapy Recommend inpatient transitional care services for continued physical therapy services.        See \"Rehab Therapy-Acute\" Patient Summary Report for complete documentation.     Pt upon arrival starting to get herself to EOB. Pt pleasantly confused and required Christi to reach full EOB position. She was able to increase gait distance to 12ft with encouragement. She requires modA for balance and fww management. She presents with narrow base of support and inconsistent step length requiring verbal cues for sequencing. Pt unable to cite precuations and pleasantly confused. She continues to require mod A for sequencing with transfers. She is making slow progress and will continue to follow while in house.   "

## 2019-04-19 NOTE — CARE PLAN
Problem: Safety  Goal: Will remain free from injury  Outcome: PROGRESSING AS EXPECTED      Problem: Psychosocial Needs:  Goal: Level of anxiety will decrease  Outcome: PROGRESSING AS EXPECTED  Patient is currently calm and resting in bed.

## 2019-04-19 NOTE — CARE PLAN
Problem: Safety  Goal: Will remain free from injury  Outcome: PROGRESSING AS EXPECTED  Fall precautions in place. Bed in lowest position. Non-skid socks in place. Personal possessions within reach. Mobility sign on door. Bed-alarm on. Call light within reach. Pt educated regarding fall prevention    Problem: Knowledge Deficit  Goal: Knowledge of disease process/condition, treatment plan, diagnostic tests, and medications will improve  Outcome: PROGRESSING AS EXPECTED  Pt educated regarding plan of care and medications. All questions answered.

## 2019-04-19 NOTE — PROGRESS NOTES
· 2 RN skin check complete: with TERRY Jeff  · Devices in place: SCDs  · Skin assessed under devices: yes, intact  · Confirmed pressure ulcers found on: no pressure ulcers found  · Findings: blanchable redness to elbows, heels and sacrum.  · The following interventions in place: waffle overlay, Q2 turns, barrier cream, elevated heels on pillow.

## 2019-04-20 PROCEDURE — A9270 NON-COVERED ITEM OR SERVICE: HCPCS | Performed by: HOSPITALIST

## 2019-04-20 PROCEDURE — A9270 NON-COVERED ITEM OR SERVICE: HCPCS | Performed by: INTERNAL MEDICINE

## 2019-04-20 PROCEDURE — 700102 HCHG RX REV CODE 250 W/ 637 OVERRIDE(OP): Performed by: INTERNAL MEDICINE

## 2019-04-20 PROCEDURE — 700102 HCHG RX REV CODE 250 W/ 637 OVERRIDE(OP): Performed by: HOSPITALIST

## 2019-04-20 PROCEDURE — 700112 HCHG RX REV CODE 229: Performed by: ORTHOPAEDIC SURGERY

## 2019-04-20 PROCEDURE — 770006 HCHG ROOM/CARE - MED/SURG/GYN SEMI*

## 2019-04-20 PROCEDURE — 700111 HCHG RX REV CODE 636 W/ 250 OVERRIDE (IP): Performed by: FAMILY MEDICINE

## 2019-04-20 PROCEDURE — 99232 SBSQ HOSP IP/OBS MODERATE 35: CPT | Performed by: HOSPITALIST

## 2019-04-20 PROCEDURE — A9270 NON-COVERED ITEM OR SERVICE: HCPCS | Performed by: ORTHOPAEDIC SURGERY

## 2019-04-20 RX ADMIN — HEPARIN SODIUM 5000 UNITS: 5000 INJECTION, SOLUTION INTRAVENOUS; SUBCUTANEOUS at 06:42

## 2019-04-20 RX ADMIN — RISPERIDONE 0.5 MG: 0.5 TABLET, FILM COATED ORAL at 17:47

## 2019-04-20 RX ADMIN — DOCUSATE SODIUM 100 MG: 100 CAPSULE, LIQUID FILLED ORAL at 17:47

## 2019-04-20 RX ADMIN — METOPROLOL TARTRATE 12.5 MG: 25 TABLET, FILM COATED ORAL at 17:47

## 2019-04-20 RX ADMIN — RISPERIDONE 0.5 MG: 0.5 TABLET, FILM COATED ORAL at 06:42

## 2019-04-20 RX ADMIN — HEPARIN SODIUM 5000 UNITS: 5000 INJECTION, SOLUTION INTRAVENOUS; SUBCUTANEOUS at 22:00

## 2019-04-20 RX ADMIN — METOPROLOL TARTRATE 12.5 MG: 25 TABLET, FILM COATED ORAL at 06:42

## 2019-04-21 PROCEDURE — 99232 SBSQ HOSP IP/OBS MODERATE 35: CPT | Performed by: HOSPITALIST

## 2019-04-21 PROCEDURE — A9270 NON-COVERED ITEM OR SERVICE: HCPCS | Performed by: HOSPITALIST

## 2019-04-21 PROCEDURE — 700112 HCHG RX REV CODE 229: Performed by: ORTHOPAEDIC SURGERY

## 2019-04-21 PROCEDURE — 700102 HCHG RX REV CODE 250 W/ 637 OVERRIDE(OP): Performed by: HOSPITALIST

## 2019-04-21 PROCEDURE — 700102 HCHG RX REV CODE 250 W/ 637 OVERRIDE(OP): Performed by: INTERNAL MEDICINE

## 2019-04-21 PROCEDURE — A9270 NON-COVERED ITEM OR SERVICE: HCPCS | Performed by: INTERNAL MEDICINE

## 2019-04-21 PROCEDURE — 700111 HCHG RX REV CODE 636 W/ 250 OVERRIDE (IP): Performed by: FAMILY MEDICINE

## 2019-04-21 PROCEDURE — A9270 NON-COVERED ITEM OR SERVICE: HCPCS | Performed by: ORTHOPAEDIC SURGERY

## 2019-04-21 PROCEDURE — 700102 HCHG RX REV CODE 250 W/ 637 OVERRIDE(OP): Performed by: ORTHOPAEDIC SURGERY

## 2019-04-21 PROCEDURE — 770006 HCHG ROOM/CARE - MED/SURG/GYN SEMI*

## 2019-04-21 RX ADMIN — RISPERIDONE 0.5 MG: 0.5 TABLET, FILM COATED ORAL at 18:14

## 2019-04-21 RX ADMIN — HEPARIN SODIUM 5000 UNITS: 5000 INJECTION, SOLUTION INTRAVENOUS; SUBCUTANEOUS at 20:00

## 2019-04-21 RX ADMIN — SENNOSIDES AND DOCUSATE SODIUM 1 TABLET: 8.6; 5 TABLET ORAL at 20:00

## 2019-04-21 RX ADMIN — METOPROLOL TARTRATE 12.5 MG: 25 TABLET, FILM COATED ORAL at 18:13

## 2019-04-21 RX ADMIN — METOPROLOL TARTRATE 12.5 MG: 25 TABLET, FILM COATED ORAL at 05:40

## 2019-04-21 RX ADMIN — RISPERIDONE 0.5 MG: 0.5 TABLET, FILM COATED ORAL at 05:40

## 2019-04-21 RX ADMIN — DOCUSATE SODIUM 100 MG: 100 CAPSULE, LIQUID FILLED ORAL at 18:00

## 2019-04-21 RX ADMIN — HEPARIN SODIUM 5000 UNITS: 5000 INJECTION, SOLUTION INTRAVENOUS; SUBCUTANEOUS at 05:40

## 2019-04-21 ASSESSMENT — PAIN SCALES - PAIN ASSESSMENT IN ADVANCED DEMENTIA (PAINAD)
CONSOLABILITY: NO NEED TO CONSOLE
FACIALEXPRESSION: SMILING OR INEXPRESSIVE
BODYLANGUAGE: RELAXED
BREATHING: NORMAL
TOTALSCORE: 0

## 2019-04-21 NOTE — CARE PLAN
Problem: Safety  Goal: Will remain free from falls  Outcome: PROGRESSING AS EXPECTED  Proper signs communicated in pts doorway; floor clear of clutter, debris, or cords; pts personal items and call light within reach; pt educated to use call light for assistance and prior to getting out of bed    Problem: Mobility  Goal: Risk for activity intolerance will decrease  Outcome: PROGRESSING AS EXPECTED  Pt OOB to chair for meals w 1 assist +FWW; Tolerates well

## 2019-04-21 NOTE — PROGRESS NOTES
"Hospital Medicine Daily Progress Note    Date of Service  4/20/2019    Chief Complaint  89 y.o. female admitted 3/25/2019 with left femoral neck fracture after ground level fall.    Hospital Course    89 y.o. female who presented 3/25/2019 with past medical history of Parkinson's disease, dementia, osteoporosis, paroxysmal atrial fibrillation, admitted with fall/syncope apparently patient was found on the floor by caretaker. EMS found patient in Afib/RVR. Due to concern for possible sepsis patient had pan CT that showed left-sided neck femoral fracture but no other acute findings, patient received broad-spectrum antibiotics. Hip fracture was surgically repaired on 3/26 with Dr. Brian. She remains confused at her baseline.        Interval Problem Update  4/20 -patient alert, gives date as of the 19th, expresses a desire to return home, but seems to have a poor understanding of why she is here.  She dismisses her hip fracture and subsequent surgical repair earlier this month as \"irrelevant\".    4/19 - patient much more alert today and trying to get out of bed. She tells me she's not sure where she wants to go, though. Redirected.     4/18 - she is calm today and comfortable. Discussed with nursing. No changes.     4/17 - discussed with nursing and therapies. She is still about the same. Today she is sleepy but arousable. She mostly sleeps. No questions today.     4/16 - calm and comfortable. Family at bedside. No acute changes today. Discussed for discharge but nothing yet.    4/15 - pleasant, forgetful, no acute changes today though. Discussed with SW/CM.     4/14 - she is alert and confused, thinking she is in [employer] (I did tell her it was [place]). She asks if I could turn down the TV so as to not bother the other congregants.     4/13 - she is asleep but arousable. Comfortable and confused but pleasant. She denies pain    Consultants/Specialty  Orthopedic surgery  Palliative care    Code " Status  DNR/DNI    Disposition  snf when able    Review of Systems  Review of Systems   Unable to perform ROS: Dementia        Physical Exam  Temp:  [36.3 °C (97.3 °F)-37.2 °C (98.9 °F)] 36.4 °C (97.5 °F)  Pulse:  [60-66] 60  Resp:  [15-17] 15  BP: ()/(39-61) 151/53  SpO2:  [94 %-100 %] 94 %    Physical Exam   HENT:   Mouth/Throat: No oropharyngeal exudate.   Eyes: Pupils are equal, round, and reactive to light. Conjunctivae are normal. No scleral icterus.   Neck: No JVD present. No tracheal deviation present.   Cardiovascular: Normal rate and regular rhythm.    No murmur heard.  Pulmonary/Chest: Effort normal and breath sounds normal. No respiratory distress.   Abdominal: Soft. She exhibits no distension. There is no tenderness.   Musculoskeletal: She exhibits no edema.   Left hip wound minimally tender   Neurological: She is alert.   Skin: Skin is warm and dry. She is not diaphoretic. No erythema.   Psychiatric: Cognition and memory are impaired. She expresses impulsivity.   Nursing note and vitals reviewed.  4/19 - no changes in exam today, alert and feisty    Fluids  No intake or output data in the 24 hours ending 04/20/19 1724                           Assessment/Plan  * Closed fracture of neck of left femur (HCC)- (present on admission)   Assessment & Plan    After a fall, had CT hip showing Acute left femoral neck fracture   Patient underwent left hemiarthroplasty on 3/26/2019.  PT/OT patient moderate assist for walker use and ambulation  Placement pending   Staples removed 4/9 and steri strips placed       Dysphagia- (present on admission)   Assessment & Plan    Initially failed swallow evaluation.  Per POA, do not place a tube feeding.  Speech therapy, continue pureed diet     Late onset Alzheimer's disease without behavioral disturbance- (present on admission)   Assessment & Plan     low dose Risperdal.      Moderate protein-calorie malnutrition (HCC)- (present on admission)   Assessment & Plan     Encourage oral intake with puree diet, no tube feeds per POA request     Paroxysmal atrial fibrillation (HCC)- (present on admission)   Assessment & Plan    Patient was found to be on A. fib with RVR, echocardiogram indicate LV EF of 75% and mild MR and TR.  Continue on low-dose metoprolol hold for low blood pressure     Normocytic anemia- (present on admission)   Assessment & Plan    Stable hemoglobin level     Hypokalemia- (present on admission)   Assessment & Plan    Replaced        Hypercholesterolemia- (present on admission)   Assessment & Plan    No evidence for statin therapy at this age           VTE prophylaxis: heparin

## 2019-04-22 PROCEDURE — 700102 HCHG RX REV CODE 250 W/ 637 OVERRIDE(OP): Performed by: HOSPITALIST

## 2019-04-22 PROCEDURE — A9270 NON-COVERED ITEM OR SERVICE: HCPCS | Performed by: ORTHOPAEDIC SURGERY

## 2019-04-22 PROCEDURE — A9270 NON-COVERED ITEM OR SERVICE: HCPCS | Performed by: HOSPITALIST

## 2019-04-22 PROCEDURE — A9270 NON-COVERED ITEM OR SERVICE: HCPCS | Performed by: INTERNAL MEDICINE

## 2019-04-22 PROCEDURE — 97530 THERAPEUTIC ACTIVITIES: CPT

## 2019-04-22 PROCEDURE — 700111 HCHG RX REV CODE 636 W/ 250 OVERRIDE (IP): Performed by: FAMILY MEDICINE

## 2019-04-22 PROCEDURE — 700112 HCHG RX REV CODE 229: Performed by: ORTHOPAEDIC SURGERY

## 2019-04-22 PROCEDURE — 700102 HCHG RX REV CODE 250 W/ 637 OVERRIDE(OP): Performed by: ORTHOPAEDIC SURGERY

## 2019-04-22 PROCEDURE — 99232 SBSQ HOSP IP/OBS MODERATE 35: CPT | Performed by: HOSPITALIST

## 2019-04-22 PROCEDURE — 700102 HCHG RX REV CODE 250 W/ 637 OVERRIDE(OP): Performed by: INTERNAL MEDICINE

## 2019-04-22 PROCEDURE — 770006 HCHG ROOM/CARE - MED/SURG/GYN SEMI*

## 2019-04-22 RX ADMIN — HEPARIN SODIUM 5000 UNITS: 5000 INJECTION, SOLUTION INTRAVENOUS; SUBCUTANEOUS at 14:13

## 2019-04-22 RX ADMIN — SENNOSIDES AND DOCUSATE SODIUM 1 TABLET: 8.6; 5 TABLET ORAL at 20:28

## 2019-04-22 RX ADMIN — DOCUSATE SODIUM 100 MG: 100 CAPSULE, LIQUID FILLED ORAL at 17:17

## 2019-04-22 RX ADMIN — RISPERIDONE 0.5 MG: 0.5 TABLET, FILM COATED ORAL at 05:26

## 2019-04-22 RX ADMIN — HEPARIN SODIUM 5000 UNITS: 5000 INJECTION, SOLUTION INTRAVENOUS; SUBCUTANEOUS at 20:28

## 2019-04-22 RX ADMIN — HEPARIN SODIUM 5000 UNITS: 5000 INJECTION, SOLUTION INTRAVENOUS; SUBCUTANEOUS at 05:26

## 2019-04-22 RX ADMIN — DOCUSATE SODIUM 100 MG: 100 CAPSULE, LIQUID FILLED ORAL at 05:26

## 2019-04-22 RX ADMIN — METOPROLOL TARTRATE 12.5 MG: 25 TABLET, FILM COATED ORAL at 17:17

## 2019-04-22 RX ADMIN — METOPROLOL TARTRATE 12.5 MG: 25 TABLET, FILM COATED ORAL at 05:26

## 2019-04-22 ASSESSMENT — COGNITIVE AND FUNCTIONAL STATUS - GENERAL
TOILETING: A LOT
PERSONAL GROOMING: A LITTLE
DRESSING REGULAR UPPER BODY CLOTHING: A LITTLE
DRESSING REGULAR LOWER BODY CLOTHING: A LOT
SUGGESTED CMS G CODE MODIFIER DAILY ACTIVITY: CK
DAILY ACTIVITIY SCORE: 16
HELP NEEDED FOR BATHING: A LOT

## 2019-04-22 NOTE — PROGRESS NOTES
[facility] Medicine Daily Progress Note    Date of Service  4/22/2019    Chief Complaint  89 y.o. female admitted 3/25/2019 with left femoral neck fracture after ground level fall.    Hospital Course    89 y.o. female who presented 3/25/2019 with past medical history of Parkinson's disease, dementia, osteoporosis, paroxysmal atrial fibrillation, admitted with fall/syncope apparently patient was found on the floor by caretaker. EMS found patient in Afib/RVR. Due to concern for possible sepsis patient had pan CT that showed left-sided neck femoral fracture but no other acute findings, patient received broad-spectrum antibiotics. Hip fracture was surgically repaired on 3/26 with Dr. Brian. She remains confused at her baseline.        Interval Problem Update  axox2  Denies pain  Alert and cooperative  Ros otherwise negative    Consultants/Specialty  Orthopedic surgery  Palliative care    Code Status  DNR/DNI    Disposition  snf when accepted    Review of Systems  Review of Systems   Unable to perform ROS: Dementia        Physical Exam  Temp:  [36.3 °C (97.3 °F)-36.7 °C (98 °F)] 36.7 °C (98 °F)  Pulse:  [68-77] 68  Resp:  [15-17] 17  BP: (131-141)/(57-76) 141/64  SpO2:  [96 %-100 %] 100 %    Physical Exam   HENT:   Mouth/Throat: No oropharyngeal exudate.   Eyes: Pupils are equal, round, and reactive to light. Conjunctivae are normal. No scleral icterus.   Neck: No JVD present. No tracheal deviation present.   Cardiovascular: Normal rate and regular rhythm.    No murmur heard.  Pulmonary/Chest: Effort normal and breath sounds normal. No respiratory distress.   Abdominal: Soft. She exhibits no distension. There is no tenderness.   Musculoskeletal: She exhibits no edema.   Left hip wound cdi   Neurological: She is alert.   Skin: Skin is warm and dry. She is not diaphoretic. No erythema.   Psychiatric: Cognition and memory are impaired. She expresses impulsivity.   Nursing note and vitals reviewed.      Fluids  No intake  or output data in the 24 hours ending 04/22/19 1026                           Assessment/Plan  * Closed fracture of neck of left femur (HCC)- (present on admission)   Assessment & Plan    After a fall, had CT hip showing Acute left femoral neck fracture   Patient underwent left hemiarthroplasty on 3/26/2019.  Continue PT/OT , currently a moderate assist for walker use and ambulation  Staples removed 4/9 and steri strips placed  Awaiting placement       Dysphagia- (present on admission)   Assessment & Plan    Initially failed swallow evaluation.  Per POA, do not place a tube feeding.  Speech therapy, continue pureed diet     Late onset Alzheimer's disease without behavioral disturbance- (present on admission)   Assessment & Plan     low dose Risperdal.      Moderate protein-calorie malnutrition (HCC)- (present on admission)   Assessment & Plan    Encourage oral intake with puree diet, no tube feeds per POA request     Paroxysmal atrial fibrillation (HCC)- (present on admission)   Assessment & Plan    Patient was found to be on A. fib with RVR, echocardiogram indicate LV EF of 75% and mild MR and TR.  Continue on low-dose metoprolol hold for low blood pressure     Normocytic anemia- (present on admission)   Assessment & Plan    Check intermittently     Hypokalemia- (present on admission)   Assessment & Plan    Replaced        Hypercholesterolemia- (present on admission)   Assessment & Plan    No evidence for statin therapy at this age           VTE prophylaxis: heparin

## 2019-04-22 NOTE — PROGRESS NOTES
· 2 RN skin check complete with TERRY Pfeiffer.  · Devices in place: SCDs and nasal cannula.  · Skin assessed under devices: yes and intact.  · Confirmed pressure ulcers found on: n/a.  · Findings: blanchable redness bilat elbows, blanchable redness bilat heels, blanchable redness on sacrum, scabs on bilat shin, healed scabs on lower back.   · The following interventions in place: encouraging repositioning and ambulation, Q2 turns, waffle overlay, barrier cream.

## 2019-04-22 NOTE — PROGRESS NOTES
"Hospital Medicine Daily Progress Note    Date of Service  4/21/2019    Chief Complaint  89 y.o. female admitted 3/25/2019 with left femoral neck fracture after ground level fall.    Hospital Course    89 y.o. female who presented 3/25/2019 with past medical history of Parkinson's disease, dementia, osteoporosis, paroxysmal atrial fibrillation, admitted with fall/syncope apparently patient was found on the floor by caretaker. EMS found patient in Afib/RVR. Due to concern for possible sepsis patient had pan CT that showed left-sided neck femoral fracture but no other acute findings, patient received broad-spectrum antibiotics. Hip fracture was surgically repaired on 3/26 with Dr. Brian. She remains confused at her baseline.        Interval Problem Update  4/21  - No acute overnight events.     4/20 -patient alert, gives date as of the 19th, expresses a desire to return home, but seems to have a poor understanding of why she is here.  She dismisses her hip fracture and subsequent surgical repair earlier this month as \"irrelevant\".    4/19 - patient much more alert today and trying to get out of bed. She tells me she's not sure where she wants to go, though. Redirected.     4/18 - she is calm today and comfortable. Discussed with nursing. No changes.     4/17 - discussed with nursing and therapies. She is still about the same. Today she is sleepy but arousable. She mostly sleeps. No questions today.     4/16 - calm and comfortable. Family at bedside. No acute changes today. Discussed for discharge but nothing yet.    4/15 - pleasant, forgetful, no acute changes today though. Discussed with SW/CM.     4/14 - she is alert and confused, thinking she is in [employer] (I did tell her it was [place]). She asks if I could turn down the TV so as to not bother the other congregants.     4/13 - she is asleep but arousable. Comfortable and confused but pleasant. She denies pain    Consultants/Specialty  Orthopedic " surgery  Palliative care    Code Status  DNR/DNI    Disposition  snf when able    Review of Systems  Review of Systems   Unable to perform ROS: Dementia        Physical Exam  Temp:  [36 °C (96.8 °F)-36.9 °C (98.5 °F)] 36.9 °C (98.5 °F)  Pulse:  [62-76] 62  Resp:  [16-18] 18  BP: (129-152)/(52-61) 129/52  SpO2:  [98 %-100 %] 98 %    Physical Exam   HENT:   Mouth/Throat: No oropharyngeal exudate.   Eyes: Pupils are equal, round, and reactive to light. Conjunctivae are normal. No scleral icterus.   Neck: No JVD present. No tracheal deviation present.   Cardiovascular: Normal rate and regular rhythm.    No murmur heard.  Pulmonary/Chest: Effort normal and breath sounds normal. No respiratory distress.   Abdominal: Soft. She exhibits no distension. There is no tenderness.   Musculoskeletal: She exhibits no edema.   Left hip wound minimally tender   Neurological: She is alert.   Skin: Skin is warm and dry. She is not diaphoretic. No erythema.   Psychiatric: Cognition and memory are impaired. She expresses impulsivity.   Nursing note and vitals reviewed.  4/19 - no changes in exam today, alert and feisty    Fluids    Intake/Output Summary (Last 24 hours) at 04/21/19 1810  Last data filed at 04/21/19 0815   Gross per 24 hour   Intake              120 ml   Output                0 ml   Net              120 ml                              Assessment/Plan  * Closed fracture of neck of left femur (HCC)- (present on admission)   Assessment & Plan    After a fall, had CT hip showing Acute left femoral neck fracture   Patient underwent left hemiarthroplasty on 3/26/2019.  PT/OT patient moderate assist for walker use and ambulation  Placement pending   Staples removed 4/9 and steri strips placed       Dysphagia- (present on admission)   Assessment & Plan    Initially failed swallow evaluation.  Per POA, do not place a tube feeding.  Speech therapy, continue pureed diet     Late onset Alzheimer's disease without behavioral  disturbance- (present on admission)   Assessment & Plan     low dose Risperdal.      Moderate protein-calorie malnutrition (HCC)- (present on admission)   Assessment & Plan    Encourage oral intake with puree diet, no tube feeds per POA request     Paroxysmal atrial fibrillation (HCC)- (present on admission)   Assessment & Plan    Patient was found to be on A. fib with RVR, echocardiogram indicate LV EF of 75% and mild MR and TR.  Continue on low-dose metoprolol hold for low blood pressure     Normocytic anemia- (present on admission)   Assessment & Plan    Stable hemoglobin level     Hypokalemia- (present on admission)   Assessment & Plan    Replaced        Hypercholesterolemia- (present on admission)   Assessment & Plan    No evidence for statin therapy at this age           VTE prophylaxis: heparin

## 2019-04-22 NOTE — CARE PLAN
Problem: Safety  Goal: Will remain free from falls  Outcome: PROGRESSING AS EXPECTED  Discussed safety and fall risk. Safety and fall precautions in place, bed/chair alarm in use, call light within reach, bed locked in lowest position, non-skid socks in place, clutter-free environment, DME in bathroom. Patient verbalized understanding of safety and fall risk and uses call light appropriately for assistance.    Problem: Skin Integrity  Goal: Risk for impaired skin integrity will decrease  Outcome: PROGRESSING AS EXPECTED  Assessed for signs of skin breakdown. Encouraging increased mobility and repositioning to prevent development of pressure ulcers. Q 2 turns in place. Waffle overlay in place. Moisturizer in use.

## 2019-04-23 PROCEDURE — 700111 HCHG RX REV CODE 636 W/ 250 OVERRIDE (IP): Performed by: FAMILY MEDICINE

## 2019-04-23 PROCEDURE — A9270 NON-COVERED ITEM OR SERVICE: HCPCS | Performed by: INTERNAL MEDICINE

## 2019-04-23 PROCEDURE — 770006 HCHG ROOM/CARE - MED/SURG/GYN SEMI*

## 2019-04-23 PROCEDURE — 99231 SBSQ HOSP IP/OBS SF/LOW 25: CPT | Performed by: INTERNAL MEDICINE

## 2019-04-23 PROCEDURE — 700102 HCHG RX REV CODE 250 W/ 637 OVERRIDE(OP): Performed by: INTERNAL MEDICINE

## 2019-04-23 PROCEDURE — 700102 HCHG RX REV CODE 250 W/ 637 OVERRIDE(OP): Performed by: HOSPITALIST

## 2019-04-23 PROCEDURE — 700112 HCHG RX REV CODE 229: Performed by: ORTHOPAEDIC SURGERY

## 2019-04-23 PROCEDURE — A9270 NON-COVERED ITEM OR SERVICE: HCPCS | Performed by: HOSPITALIST

## 2019-04-23 PROCEDURE — A9270 NON-COVERED ITEM OR SERVICE: HCPCS | Performed by: ORTHOPAEDIC SURGERY

## 2019-04-23 RX ADMIN — RISPERIDONE 0.5 MG: 0.5 TABLET, FILM COATED ORAL at 17:34

## 2019-04-23 RX ADMIN — HEPARIN SODIUM 5000 UNITS: 5000 INJECTION, SOLUTION INTRAVENOUS; SUBCUTANEOUS at 20:54

## 2019-04-23 RX ADMIN — METOPROLOL TARTRATE 12.5 MG: 25 TABLET, FILM COATED ORAL at 05:04

## 2019-04-23 RX ADMIN — HEPARIN SODIUM 5000 UNITS: 5000 INJECTION, SOLUTION INTRAVENOUS; SUBCUTANEOUS at 05:07

## 2019-04-23 RX ADMIN — HEPARIN SODIUM 5000 UNITS: 5000 INJECTION, SOLUTION INTRAVENOUS; SUBCUTANEOUS at 13:23

## 2019-04-23 RX ADMIN — DOCUSATE SODIUM 100 MG: 100 CAPSULE, LIQUID FILLED ORAL at 05:05

## 2019-04-23 RX ADMIN — METOPROLOL TARTRATE 12.5 MG: 25 TABLET, FILM COATED ORAL at 17:34

## 2019-04-23 NOTE — CARE PLAN
Problem: Safety  Goal: Will remain free from falls  Outcome: PROGRESSING AS EXPECTED  Treaded socks in place, bed in the lowest position, bed alarm on, call light and belongings within reach, pt call for assistance appropriately    Problem: Skin Integrity  Goal: Risk for impaired skin integrity will decrease  Outcome: PROGRESSING AS EXPECTED  q2 turns, fabiola risk assessment, applied barrier cream

## 2019-04-23 NOTE — PROGRESS NOTES
· 2 RN skin check complete with TERRY Youssef.  · Devices in place: SCDs (off; pt refused) and nasal cannula.  · Skin assessed under devices: yes and intact.  · Confirmed pressure ulcers found on: n/a.  · Findings: blanchable redness bilat elbows, blanchable redness bilat heels, blanchable redness on sacrum, scabs on bilat shin, healed scabs on lower back.   · The following interventions in place: encouraging repositioning and ambulation, Q2 turns, waffle overlay, barrier cream.

## 2019-04-23 NOTE — PROGRESS NOTES
[facility] Medicine Daily Progress Note    Date of Service  4/23/2019    Chief Complaint  89 y.o. female admitted 3/25/2019 with left femoral neck fracture after ground level fall.    Hospital Course    89 y.o. female who presented 3/25/2019 with past medical history of Parkinson's disease, dementia, osteoporosis, paroxysmal atrial fibrillation, admitted with fall/syncope apparently patient was found on the floor by caretaker. EMS found patient in Afib/RVR. Due to concern for possible sepsis patient had pan CT that showed left-sided neck femoral fracture but no other acute findings, patient received broad-spectrum antibiotics. Hip fracture was surgically repaired on 3/26 with Dr. Brian. She remains confused at her baseline. Awaiting long-term placement.       Interval Problem Update  A/O x 2  Denies pain  Alert and cooperative  Moves LE to command    Consultants/Specialty  Orthopedic surgery  Palliative care    Code Status  DNR/DNI    Disposition  snf when accepted    Review of Systems  Review of Systems   Unable to perform ROS: Dementia        Physical Exam  Temp:  [36.2 °C (97.2 °F)-36.7 °C (98.1 °F)] 36.3 °C (97.3 °F)  Pulse:  [62-83] 65  Resp:  [16-18] 16  BP: (120-155)/(47-74) 155/61  SpO2:  [94 %-99 %] 94 %    Physical Exam   HENT:   Mouth/Throat: No oropharyngeal exudate.   Eyes: Pupils are equal, round, and reactive to light. Conjunctivae are normal. No scleral icterus.   Neck: No JVD present. No tracheal deviation present.   Cardiovascular: Normal rate and regular rhythm.    No murmur heard.  Pulmonary/Chest: Effort normal and breath sounds normal. No respiratory distress.   Abdominal: Soft. She exhibits no distension. There is no tenderness.   Musculoskeletal: She exhibits no edema.   Left hip wound cdi   Neurological: She is alert.   Skin: Skin is warm and dry. She is not diaphoretic. No erythema.   Psychiatric: Cognition and memory are impaired. She expresses impulsivity.   Nursing note and vitals  reviewed.      Fluids    Intake/Output Summary (Last 24 hours) at 04/23/19 1255  Last data filed at 04/23/19 1020   Gross per 24 hour   Intake              720 ml   Output                0 ml   Net              720 ml                              Assessment/Plan  * Closed fracture of neck of left femur (HCC)- (present on admission)   Assessment & Plan    After a fall, had CT hip showing Acute left femoral neck fracture   Patient underwent left hemiarthroplasty on 3/26/2019.  Continue PT/OT , currently a moderate assist for walker use and ambulation  Staples removed 4/9 and steri strips placed  Awaiting placement       Dysphagia- (present on admission)   Assessment & Plan    Initially failed swallow evaluation.  Per POA, do not place a tube feeding.  Speech therapy, continue pureed diet     Late onset Alzheimer's disease without behavioral disturbance- (present on admission)   Assessment & Plan     low dose Risperdal.      Moderate protein-calorie malnutrition (HCC)- (present on admission)   Assessment & Plan    Encourage oral intake with puree diet, no tube feeds per POA request     Paroxysmal atrial fibrillation (HCC)- (present on admission)   Assessment & Plan    Patient was found to be on A. fib with RVR, echocardiogram indicate LV EF of 75% and mild MR and TR.  Continue on low-dose metoprolol hold for low blood pressure     Normocytic anemia- (present on admission)   Assessment & Plan    Check intermittently     Hypokalemia- (present on admission)   Assessment & Plan    Replaced        Hypercholesterolemia- (present on admission)   Assessment & Plan    No evidence for statin therapy at this age         No changes to PE/ROS from previous day except as noted above      VTE prophylaxis: heparin

## 2019-04-23 NOTE — CARE PLAN
Problem: Bowel/Gastric:  Goal: Normal bowel function is maintained or improved  Outcome: PROGRESSING AS EXPECTED  Last bowel movement 4/22/19. Will hold bowel protocol meds due to loose stool. Encouraging ambulation, hydration, and oral intake.     Problem: Mobility  Goal: Risk for activity intolerance will decrease  Outcome: PROGRESSING AS EXPECTED  Patient is tolerating transfers well with hand held assist. Will reinforce the use of call light to get out of bed.

## 2019-04-24 PROCEDURE — A9270 NON-COVERED ITEM OR SERVICE: HCPCS | Performed by: HOSPITALIST

## 2019-04-24 PROCEDURE — 700102 HCHG RX REV CODE 250 W/ 637 OVERRIDE(OP): Performed by: INTERNAL MEDICINE

## 2019-04-24 PROCEDURE — 700111 HCHG RX REV CODE 636 W/ 250 OVERRIDE (IP): Performed by: FAMILY MEDICINE

## 2019-04-24 PROCEDURE — 99231 SBSQ HOSP IP/OBS SF/LOW 25: CPT | Performed by: INTERNAL MEDICINE

## 2019-04-24 PROCEDURE — 97116 GAIT TRAINING THERAPY: CPT

## 2019-04-24 PROCEDURE — A9270 NON-COVERED ITEM OR SERVICE: HCPCS | Performed by: ORTHOPAEDIC SURGERY

## 2019-04-24 PROCEDURE — 700112 HCHG RX REV CODE 229: Performed by: ORTHOPAEDIC SURGERY

## 2019-04-24 PROCEDURE — 770006 HCHG ROOM/CARE - MED/SURG/GYN SEMI*

## 2019-04-24 PROCEDURE — A9270 NON-COVERED ITEM OR SERVICE: HCPCS | Performed by: INTERNAL MEDICINE

## 2019-04-24 PROCEDURE — 700102 HCHG RX REV CODE 250 W/ 637 OVERRIDE(OP): Performed by: HOSPITALIST

## 2019-04-24 PROCEDURE — 92526 ORAL FUNCTION THERAPY: CPT

## 2019-04-24 RX ADMIN — RISPERIDONE 0.5 MG: 0.5 TABLET, FILM COATED ORAL at 17:39

## 2019-04-24 RX ADMIN — HEPARIN SODIUM 5000 UNITS: 5000 INJECTION, SOLUTION INTRAVENOUS; SUBCUTANEOUS at 22:00

## 2019-04-24 RX ADMIN — HEPARIN SODIUM 5000 UNITS: 5000 INJECTION, SOLUTION INTRAVENOUS; SUBCUTANEOUS at 14:31

## 2019-04-24 RX ADMIN — RISPERIDONE 0.5 MG: 0.5 TABLET, FILM COATED ORAL at 06:43

## 2019-04-24 RX ADMIN — HEPARIN SODIUM 5000 UNITS: 5000 INJECTION, SOLUTION INTRAVENOUS; SUBCUTANEOUS at 06:43

## 2019-04-24 RX ADMIN — METOPROLOL TARTRATE 12.5 MG: 25 TABLET, FILM COATED ORAL at 17:39

## 2019-04-24 RX ADMIN — METOPROLOL TARTRATE 12.5 MG: 25 TABLET, FILM COATED ORAL at 06:43

## 2019-04-24 RX ADMIN — DOCUSATE SODIUM 100 MG: 100 CAPSULE, LIQUID FILLED ORAL at 06:43

## 2019-04-24 ASSESSMENT — PAIN SCALES - PAIN ASSESSMENT IN ADVANCED DEMENTIA (PAINAD)
TOTALSCORE: 0
TOTALSCORE: 0
CONSOLABILITY: NO NEED TO CONSOLE
BODYLANGUAGE: RELAXED
FACIALEXPRESSION: SMILING OR INEXPRESSIVE
BODYLANGUAGE: RELAXED
CONSOLABILITY: NO NEED TO CONSOLE
BREATHING: NORMAL
BREATHING: NORMAL
FACIALEXPRESSION: SMILING OR INEXPRESSIVE

## 2019-04-24 ASSESSMENT — COGNITIVE AND FUNCTIONAL STATUS - GENERAL
STANDING UP FROM CHAIR USING ARMS: A LOT
MOVING TO AND FROM BED TO CHAIR: UNABLE
TURNING FROM BACK TO SIDE WHILE IN FLAT BAD: UNABLE
WALKING IN HOSPITAL ROOM: A LOT
CLIMB 3 TO 5 STEPS WITH RAILING: TOTAL
MOVING FROM LYING ON BACK TO SITTING ON SIDE OF FLAT BED: UNABLE
MOBILITY SCORE: 8
SUGGESTED CMS G CODE MODIFIER MOBILITY: CM

## 2019-04-24 ASSESSMENT — GAIT ASSESSMENTS
DEVIATION: BRADYKINETIC;SHUFFLED GAIT
GAIT LEVEL OF ASSIST: MODERATE ASSIST
DISTANCE (FEET): 15
ASSISTIVE DEVICE: FRONT WHEEL WALKER

## 2019-04-24 NOTE — PROGRESS NOTES
Pharmacy Pharmacotherapy Consult for LOS >30 days    Admit Date: 3/25/2019      Medications were reviewed for appropriateness and ongoing need.     Current Facility-Administered Medications   Medication Dose Route Frequency Provider Last Rate Last Dose   • haloperidol lactate (HALDOL) injection 1 mg  1 mg Intramuscular Q6HRS PRN Dex Gerard M.D.       • haloperidol (HALDOL) 2 MG/ML solution 2 mg  2 mg Oral Q6HRS PRN Dex Gerard M.D.       • QUEtiapine (SEROQUEL) tablet 50 mg  50 mg Oral Q6HRS PRN John Phelps M.D.   50 mg at 04/19/19 1719   • cyanocobalamin (VITAMIN B-12) injection 1,000 mcg  1,000 mcg Intramuscular Q30 DAYS ALL Pro.OYasmin   1,000 mcg at 03/31/19 2152   • risperiDONE (RISPERDAL) tablet 0.5 mg  0.5 mg Oral BID ALL Pro.O.   0.5 mg at 04/24/19 0643   • heparin injection 5,000 Units  5,000 Units Subcutaneous Q8HRS Aleah Lucia M.D.   5,000 Units at 04/24/19 1431   • ondansetron (ZOFRAN) syringe/vial injection 4 mg  4 mg Intravenous Q4HRS PRN Arsenio Lewis M.D.       • diphenhydrAMINE (BENADRYL) injection 25 mg  25 mg Intravenous Q6HRS PRN Arsenio Lewis M.D.   Stopped at 03/26/19 2241   • scopolamine (TRANSDERM-SCOP) patch 1 Patch  1 Patch Transdermal Q72HRS PRN Arsenio Lewis M.D.       • docusate sodium (COLACE) capsule 100 mg  100 mg Oral BID Arsenio Lewis M.D.   100 mg at 04/24/19 0643   • senna-docusate (PERICOLACE or SENOKOT S) 8.6-50 MG per tablet 1 Tab  1 Tab Oral Nightly Arsenio Lewis M.D.   Stopped at 04/23/19 2100   • senna-docusate (PERICOLACE or SENOKOT S) 8.6-50 MG per tablet 1 Tab  1 Tab Oral Q24HRS PRN Arsenio Lewis M.D.   Stopped at 03/29/19 2220   • polyethylene glycol/lytes (MIRALAX) PACKET 1 Packet  1 Packet Oral BID PRN Arsenio Lewis M.D.       • magnesium hydroxide (MILK OF MAGNESIA) suspension 30 mL  30 mL Oral QDAY PRN Arsenio Lewis M.D.       • bisacodyl (DULCOLAX) suppository 10 mg  10 mg Rectal Q24HRS PRN Arsenio Lewis M.D.        • fleet enema 133 mL  1 Each Rectal Once PRN Arsenio Lewis M.D.       • Respiratory Care per Protocol   Nebulization Continuous RT Clement Hensley M.D.       • oxyCODONE immediate-release (ROXICODONE) tablet 2.5 mg  2.5 mg Oral Q3HRS PRN Clement Hensley M.D.   2.5 mg at 04/12/19 2306   • ondansetron (ZOFRAN ODT) dispertab 4 mg  4 mg Oral Q4HRS PRN Clement Hensley M.D.       • metoprolol (LOPRESSOR) tablet 12.5 mg  12.5 mg Oral TWICE DAILY Clement Hensley M.D.   12.5 mg at 04/24/19 0643       Recommendations:    Pt started quetiapine beginning of 4/7/19 and is also using risperidone. She has not had an updated QTc - recommend obtaining new interval.    Never used and may consider discontinuation: PO Haloperidol, bisacodyl suppository, diphenhydramine, MoM, ondansetron IV & PO, Miralax, scopolamine, PRN Pericolace.    Kirk Powell, Pharmacy Intern

## 2019-04-24 NOTE — CARE PLAN
Problem: Safety  Goal: Will remain free from falls  Outcome: PROGRESSING AS EXPECTED  Discussed safety and fall risk. Safety and fall precautions in place, bed/chair alarm in use, call light within reach, bed locked in lowest position, non-skid socks in place, clutter-free environment, DME in bathroom.     Problem: Skin Integrity  Goal: Risk for impaired skin integrity will decrease  Outcome: PROGRESSING AS EXPECTED  Assessed for signs of skin breakdown. Encouraging increased mobility and repositioning to prevent development of pressure ulcers. Q 2 turns in place. Waffle overlay in place. Moisturizer in use.

## 2019-04-24 NOTE — PROGRESS NOTES
[facility] Medicine Daily Progress Note    Date of Service  4/24/2019    Chief Complaint  89 y.o. female admitted 3/25/2019 with left femoral neck fracture after ground level fall.    Hospital Course    89 y.o. female who presented 3/25/2019 with past medical history of Parkinson's disease, dementia, osteoporosis, paroxysmal atrial fibrillation, admitted with fall/syncope apparently patient was found on the floor by caretaker. EMS found patient in Afib/RVR. Due to concern for possible sepsis patient had pan CT that showed left-sided neck femoral fracture but no other acute findings, patient received broad-spectrum antibiotics. Hip fracture was surgically repaired on 3/26 with Dr. Brian. She remains confused at her baseline. Awaiting long-term placement.       Interval Problem Update  Sleeping. Awakens easily. Remains confused.   Denies pain  ROSARIO  hip CDI    Consultants/Specialty  Orthopedic surgery  Palliative care    Code Status  DNR/DNI    Disposition  snf when accepted    Review of Systems  Review of Systems   Unable to perform ROS: Dementia        Physical Exam  Temp:  [36.2 °C (97.1 °F)-36.8 °C (98.2 °F)] 36.2 °C (97.1 °F)  Pulse:  [59-88] 61  Resp:  [14-17] 14  BP: (114-149)/(47-76) 132/47  SpO2:  [90 %-94 %] 91 %    Physical Exam   HENT:   Mouth/Throat: No oropharyngeal exudate.   Eyes: Pupils are equal, round, and reactive to light. Conjunctivae are normal. No scleral icterus.   Neck: No JVD present. No tracheal deviation present.   Cardiovascular: Normal rate and regular rhythm.    No murmur heard.  Pulmonary/Chest: Effort normal and breath sounds normal. No respiratory distress.   Abdominal: Soft. She exhibits no distension. There is no tenderness.   Musculoskeletal: She exhibits no edema.   Left hip wound cdi   Neurological: She is alert.   Skin: Skin is warm and dry. She is not diaphoretic. No erythema.   Psychiatric: Cognition and memory are impaired. She expresses impulsivity.   Nursing note and  vitals reviewed.      Fluids    Intake/Output Summary (Last 24 hours) at 04/24/19 1040  Last data filed at 04/23/19 1636   Gross per 24 hour   Intake                0 ml   Output                0 ml   Net                0 ml                              Assessment/Plan  * Closed fracture of neck of left femur (HCC)- (present on admission)   Assessment & Plan    After a fall, had CT hip showing Acute left femoral neck fracture   Patient underwent left hemiarthroplasty on 3/26/2019.  Continue PT/OT , currently a moderate assist for walker use and ambulation  Staples removed 4/9 and steri strips placed  Awaiting placement       Dysphagia- (present on admission)   Assessment & Plan    Initially failed swallow evaluation.  Per POA, do not place a tube feeding.  Speech therapy, continue pureed diet     Late onset Alzheimer's disease without behavioral disturbance- (present on admission)   Assessment & Plan     low dose Risperdal.      Moderate protein-calorie malnutrition (HCC)- (present on admission)   Assessment & Plan    Encourage oral intake with puree diet, no tube feeds per POA request     Paroxysmal atrial fibrillation (HCC)- (present on admission)   Assessment & Plan    Patient was found to be on A. fib with RVR, echocardiogram indicate LV EF of 75% and mild MR and TR.  Continue on low-dose metoprolol hold for low blood pressure     Normocytic anemia- (present on admission)   Assessment & Plan    Check intermittently     Hypokalemia- (present on admission)   Assessment & Plan    Replaced        Hypercholesterolemia- (present on admission)   Assessment & Plan    No evidence for statin therapy at this age         No changes to PE/ROS from previous day except as noted above      VTE prophylaxis: heparin

## 2019-04-24 NOTE — THERAPY
"Speech Language Therapy dysphagia treatment completed.   Functional Status:  Pt seen for dysphagia tx this date. Pt A&Ox2 (to self and hospital) only. Safe swallow strategy training conducted during meal of Dysphagia 1/NTL textures (pt is eating despite risk). Intermittent wet vocal quality occurred with cup sips of NTL and tsps of soup. Taught pt to execute throat clear and dry swallow every 2-3 bites/sips, which she executed with min verbal cues throughout the rest of the meal. Pt reporting fatigue, requesting to go back to bed after meal. Swallow exercises not trained today for this reason. Recommend continuing current diet despite risk with 1:1 supervision for safe swallow strategies as posted.   Recommendations: Dysphagia 1/NTL (despite risk) with 1:1 supervision: small bite/sips, slow rate of intake, upright and alert for all PO intake, throat clear and reswallow every 2-3 bites/sips.   Plan of Care: Will benefit from Speech Therapy 2 times per week  Post-Acute Therapy: Recommend inpatient transitional care services for continued speech therapy services.        See \"Rehab Therapy-Acute\" Patient Summary Report for complete documentation.     "

## 2019-04-25 PROCEDURE — 700112 HCHG RX REV CODE 229: Performed by: ORTHOPAEDIC SURGERY

## 2019-04-25 PROCEDURE — A9270 NON-COVERED ITEM OR SERVICE: HCPCS | Performed by: ORTHOPAEDIC SURGERY

## 2019-04-25 PROCEDURE — 700111 HCHG RX REV CODE 636 W/ 250 OVERRIDE (IP): Performed by: FAMILY MEDICINE

## 2019-04-25 PROCEDURE — 770006 HCHG ROOM/CARE - MED/SURG/GYN SEMI*

## 2019-04-25 PROCEDURE — 700102 HCHG RX REV CODE 250 W/ 637 OVERRIDE(OP): Performed by: HOSPITALIST

## 2019-04-25 PROCEDURE — 99232 SBSQ HOSP IP/OBS MODERATE 35: CPT | Performed by: INTERNAL MEDICINE

## 2019-04-25 PROCEDURE — 700102 HCHG RX REV CODE 250 W/ 637 OVERRIDE(OP): Performed by: INTERNAL MEDICINE

## 2019-04-25 PROCEDURE — A9270 NON-COVERED ITEM OR SERVICE: HCPCS | Performed by: HOSPITALIST

## 2019-04-25 PROCEDURE — A9270 NON-COVERED ITEM OR SERVICE: HCPCS | Performed by: INTERNAL MEDICINE

## 2019-04-25 PROCEDURE — 97530 THERAPEUTIC ACTIVITIES: CPT

## 2019-04-25 RX ADMIN — METOPROLOL TARTRATE 12.5 MG: 25 TABLET, FILM COATED ORAL at 17:25

## 2019-04-25 RX ADMIN — HEPARIN SODIUM 5000 UNITS: 5000 INJECTION, SOLUTION INTRAVENOUS; SUBCUTANEOUS at 22:00

## 2019-04-25 RX ADMIN — QUETIAPINE FUMARATE 50 MG: 25 TABLET ORAL at 17:25

## 2019-04-25 RX ADMIN — HEPARIN SODIUM 5000 UNITS: 5000 INJECTION, SOLUTION INTRAVENOUS; SUBCUTANEOUS at 14:00

## 2019-04-25 RX ADMIN — METOPROLOL TARTRATE 12.5 MG: 25 TABLET, FILM COATED ORAL at 06:00

## 2019-04-25 RX ADMIN — DOCUSATE SODIUM 100 MG: 100 CAPSULE, LIQUID FILLED ORAL at 17:25

## 2019-04-25 RX ADMIN — HEPARIN SODIUM 5000 UNITS: 5000 INJECTION, SOLUTION INTRAVENOUS; SUBCUTANEOUS at 06:10

## 2019-04-25 RX ADMIN — RISPERIDONE 0.5 MG: 0.5 TABLET, FILM COATED ORAL at 06:10

## 2019-04-25 RX ADMIN — RISPERIDONE 0.5 MG: 0.5 TABLET, FILM COATED ORAL at 17:27

## 2019-04-25 ASSESSMENT — COGNITIVE AND FUNCTIONAL STATUS - GENERAL
DAILY ACTIVITIY SCORE: 16
DRESSING REGULAR LOWER BODY CLOTHING: A LOT
SUGGESTED CMS G CODE MODIFIER DAILY ACTIVITY: CK
PERSONAL GROOMING: A LITTLE
TOILETING: A LOT
HELP NEEDED FOR BATHING: A LOT
DRESSING REGULAR UPPER BODY CLOTHING: A LITTLE

## 2019-04-25 ASSESSMENT — PAIN SCALES - PAIN ASSESSMENT IN ADVANCED DEMENTIA (PAINAD)
BREATHING: NORMAL
BODYLANGUAGE: RELAXED
CONSOLABILITY: NO NEED TO CONSOLE
TOTALSCORE: 0
FACIALEXPRESSION: SMILING OR INEXPRESSIVE

## 2019-04-25 NOTE — THERAPY
"Occupational Therapy Treatment completed with focus on ADLs and ADL transfers.  Functional Status:  Min A with ADLds and txfs  Plan of Care: Will benefit from Occupational Therapy 3 times per week  Discharge Recommendations:  Equipment Will Continue to Assess for Equipment Needs. Post-acute therapy Discharge to a transitional care facility for continued therapy services.    See \"Rehab Therapy-Acute\" Patient Summary Report for complete documentation.   "

## 2019-04-25 NOTE — PROGRESS NOTES
[facility] Medicine Daily Progress Note    Date of Service  4/25/2019    Chief Complaint  89 y.o. female admitted 3/25/2019 with left femoral neck fracture after ground level fall.    Hospital Course    89 y.o. female who presented 3/25/2019 with past medical history of Parkinson's disease, dementia, osteoporosis, paroxysmal atrial fibrillation, admitted with fall/syncope apparently patient was found on the floor by caretaker. EMS found patient in Afib/RVR. Due to concern for possible sepsis patient had pan CT that showed left-sided neck femoral fracture but no other acute findings, patient received broad-spectrum antibiotics. Hip fracture was surgically repaired on 3/26 with Dr. Brian. She remains confused at her baseline. Awaiting long-term placement.       Interval Problem Update  Sleeping. Awakens easily. Remains confused.   Denies pain  ROSARIO  hip CDI  No changes clinically c/w yesterday    Consultants/Specialty  Orthopedic surgery  Palliative care    Code Status  DNR/DNI    Disposition  snf when accepted    Review of Systems  Review of Systems   Unable to perform ROS: Dementia        Physical Exam  Temp:  [36 °C (96.8 °F)-36.4 °C (97.6 °F)] 36.4 °C (97.6 °F)  Pulse:  [66-82] 70  Resp:  [15-17] 17  BP: (107-130)/(49-58) 113/52  SpO2:  [90 %-92 %] 92 %    Physical Exam   Eyes: Pupils are equal, round, and reactive to light. Conjunctivae are normal. No scleral icterus.   Neck: No tracheal deviation present.   Cardiovascular: Normal rate and regular rhythm.    No murmur heard.  Pulmonary/Chest: Effort normal and breath sounds normal. No respiratory distress.   Abdominal: Soft. She exhibits no distension. There is no tenderness.   Musculoskeletal: She exhibits no edema.   Left hip wound cdi   Neurological: She is alert.   Skin: Skin is warm and dry. She is not diaphoretic. No erythema.   Psychiatric: Cognition and memory are impaired. She expresses impulsivity.   Nursing note and vitals reviewed.      Fluids  No  intake or output data in the 24 hours ending 04/25/19 0933                           Assessment/Plan  * Closed fracture of neck of left femur (HCC)- (present on admission)   Assessment & Plan    After a fall, had CT hip showing Acute left femoral neck fracture   Patient underwent left hemiarthroplasty on 3/26/2019.  Continue PT/OT , currently a moderate assist for walker use and ambulation  Staples removed 4/9 and steri strips placed  Awaiting placement       Dysphagia- (present on admission)   Assessment & Plan    Initially failed swallow evaluation.  Per POA, do not place a tube feeding.  Speech therapy, continue pureed diet     Late onset Alzheimer's disease without behavioral disturbance- (present on admission)   Assessment & Plan     low dose Risperdal.      Moderate protein-calorie malnutrition (HCC)- (present on admission)   Assessment & Plan    Encourage oral intake with puree diet, no tube feeds per POA request     Paroxysmal atrial fibrillation (HCC)- (present on admission)   Assessment & Plan    Patient was found to be on A. fib with RVR, echocardiogram indicate LV EF of 75% and mild MR and TR.  Continue on low-dose metoprolol hold for low blood pressure     Normocytic anemia- (present on admission)   Assessment & Plan    Check intermittently     Hypokalemia- (present on admission)   Assessment & Plan    Replaced        Hypercholesterolemia- (present on admission)   Assessment & Plan    No evidence for statin therapy at this age         No changes to PE/ROS from previous day except as noted above      VTE prophylaxis: heparin

## 2019-04-25 NOTE — CARE PLAN
Problem: Safety  Goal: Will remain free from injury  Discussed safety and fall risk. Safety and fall precautions in place, bed/chair alarm in use, call light within reach, bed locked in lowest position, non-skid socks in place, clutter-free environment, DME in bathroom. Patient verbalized understanding of safety and fall risk and uses call light appropriately for assistance.

## 2019-04-25 NOTE — THERAPY
"Physical Therapy Treatment completed.   Bed Mobility:  Supine to Sit: Minimal Assist  Transfers: Sit to Stand: Minimal Assist  Gait: Level Of Assist: Moderate Assist with Front-Wheel Walker 15ft       Plan of Care: Will benefit from Physical Therapy 4 times per week  Discharge Recommendations: Equipment: Will continue to assess. Post-acute therapy Recommend inpatient transitional care services for continued physical therapy services.        See \"Rehab Therapy-Acute\" Patient Summary Report for complete documentation.     Pt pleasant and agreeable to therapy. She is pleasantly confused at this time. She is making very slow progress and able to ambualte 15ft with modA with fww for stability and sequencing. Continue to recommend post acute transitional care facility. Will continue to follow while in house.   "

## 2019-04-26 PROCEDURE — 700102 HCHG RX REV CODE 250 W/ 637 OVERRIDE(OP): Performed by: ORTHOPAEDIC SURGERY

## 2019-04-26 PROCEDURE — 97530 THERAPEUTIC ACTIVITIES: CPT

## 2019-04-26 PROCEDURE — A9270 NON-COVERED ITEM OR SERVICE: HCPCS | Performed by: HOSPITALIST

## 2019-04-26 PROCEDURE — A9270 NON-COVERED ITEM OR SERVICE: HCPCS | Performed by: INTERNAL MEDICINE

## 2019-04-26 PROCEDURE — 700112 HCHG RX REV CODE 229: Performed by: ORTHOPAEDIC SURGERY

## 2019-04-26 PROCEDURE — 700111 HCHG RX REV CODE 636 W/ 250 OVERRIDE (IP): Performed by: FAMILY MEDICINE

## 2019-04-26 PROCEDURE — A9270 NON-COVERED ITEM OR SERVICE: HCPCS | Performed by: ORTHOPAEDIC SURGERY

## 2019-04-26 PROCEDURE — 700102 HCHG RX REV CODE 250 W/ 637 OVERRIDE(OP): Performed by: HOSPITALIST

## 2019-04-26 PROCEDURE — 700102 HCHG RX REV CODE 250 W/ 637 OVERRIDE(OP): Performed by: INTERNAL MEDICINE

## 2019-04-26 PROCEDURE — 99231 SBSQ HOSP IP/OBS SF/LOW 25: CPT | Performed by: INTERNAL MEDICINE

## 2019-04-26 PROCEDURE — 97116 GAIT TRAINING THERAPY: CPT

## 2019-04-26 PROCEDURE — 770006 HCHG ROOM/CARE - MED/SURG/GYN SEMI*

## 2019-04-26 RX ADMIN — DOCUSATE SODIUM 100 MG: 100 CAPSULE, LIQUID FILLED ORAL at 17:19

## 2019-04-26 RX ADMIN — HEPARIN SODIUM 5000 UNITS: 5000 INJECTION, SOLUTION INTRAVENOUS; SUBCUTANEOUS at 06:31

## 2019-04-26 RX ADMIN — RISPERIDONE 0.5 MG: 0.5 TABLET, FILM COATED ORAL at 17:19

## 2019-04-26 RX ADMIN — DOCUSATE SODIUM 100 MG: 100 CAPSULE, LIQUID FILLED ORAL at 06:32

## 2019-04-26 RX ADMIN — RISPERIDONE 0.5 MG: 0.5 TABLET, FILM COATED ORAL at 06:32

## 2019-04-26 RX ADMIN — METOPROLOL TARTRATE 12.5 MG: 25 TABLET, FILM COATED ORAL at 06:32

## 2019-04-26 RX ADMIN — METOPROLOL TARTRATE 12.5 MG: 25 TABLET, FILM COATED ORAL at 17:19

## 2019-04-26 RX ADMIN — HEPARIN SODIUM 5000 UNITS: 5000 INJECTION, SOLUTION INTRAVENOUS; SUBCUTANEOUS at 22:49

## 2019-04-26 ASSESSMENT — GAIT ASSESSMENTS
DISTANCE (FEET): 30
GAIT LEVEL OF ASSIST: MINIMAL ASSIST
ASSISTIVE DEVICE: FRONT WHEEL WALKER
DEVIATION: BRADYKINETIC;SHUFFLED GAIT;DECREASED BASE OF SUPPORT

## 2019-04-26 ASSESSMENT — PAIN SCALES - WONG BAKER: WONGBAKER_NUMERICALRESPONSE: DOESN'T HURT AT ALL

## 2019-04-26 ASSESSMENT — COGNITIVE AND FUNCTIONAL STATUS - GENERAL
SUGGESTED CMS G CODE MODIFIER MOBILITY: CL
MOBILITY SCORE: 14
TURNING FROM BACK TO SIDE WHILE IN FLAT BAD: A LITTLE
MOVING TO AND FROM BED TO CHAIR: A LITTLE
STANDING UP FROM CHAIR USING ARMS: A LITTLE
CLIMB 3 TO 5 STEPS WITH RAILING: TOTAL
MOVING FROM LYING ON BACK TO SITTING ON SIDE OF FLAT BED: UNABLE
WALKING IN HOSPITAL ROOM: A LITTLE

## 2019-04-26 NOTE — DIETARY
Nutrition Services: Weekly Re-Screen Poor PO  Day 32 of admit.  Rosalba Wagner is a 89 y.o. female with admitting DX of A-fib, Femur fracture     Pt is currently on regular, dysphagia 1 diet with nectar thick liquids and 1:1 supervision. Pt is receiving Boost VHC TID with meals. Boost VHC TID provides 1590 kcal and 66 gm protein, if 100% is consumed. Per chart pt PO 0-<25% most meals documented. Spoke to RN. RN states pt does still consume the Boost VHC. RN states pt typically has at least 2 Boost VHC per day, which would meet pt's estimated needs. Wt 4/24: 47.8 kg via bed scale - wt has trended up since admit.     Malnutrition Risk: No criteria noted at this time.     Recommendations/Plan:  1. Continue encourage Boost VHC   2. Encourage intake of meals  3. Document intake of all meals as % taken in ADL's to provide interdisciplinary communication across all shifts.   4. Monitor weight.  5. Nutrition rep will continue to see patient for ongoing meal and snack preferences.    RD available prn.

## 2019-04-26 NOTE — PROGRESS NOTES
[facility] Medicine Daily Progress Note    Date of Service  4/26/2019    Chief Complaint  89 y.o. female admitted 3/25/2019 with left femoral neck fracture after ground level fall.    Hospital Course    89 y.o. female who presented 3/25/2019 with past medical history of Parkinson's disease, dementia, osteoporosis, paroxysmal atrial fibrillation, admitted with fall/syncope apparently patient was found on the floor by caretaker. EMS found patient in Afib/RVR. Due to concern for possible sepsis patient had pan CT that showed left-sided neck femoral fracture but no other acute findings, patient received broad-spectrum antibiotics. Hip fracture was surgically repaired on 3/26 with Dr. Brian. She remains confused at her baseline. Awaiting long-term placement.       Interval Problem Update  VSS - no clinical changes  Hip CDI    Consultants/Specialty  Orthopedic surgery  Palliative care    Code Status  DNR/DNI    Disposition  snf when accepted    Review of Systems  Review of Systems   Unable to perform ROS: Dementia        Physical Exam  Temp:  [36.1 °C (97 °F)-36.9 °C (98.4 °F)] 36.9 °C (98.4 °F)  Pulse:  [] 68  Resp:  [15-17] 16  BP: (106-117)/(49-70) 115/63  SpO2:  [91 %-95 %] 95 %    Physical Exam   Eyes: Pupils are equal, round, and reactive to light. Conjunctivae are normal. No scleral icterus.   Neck: No tracheal deviation present.   Cardiovascular: Normal rate and regular rhythm.    No murmur heard.  Pulmonary/Chest: Effort normal and breath sounds normal. No respiratory distress.   Abdominal: Soft. She exhibits no distension. There is no tenderness.   Musculoskeletal: She exhibits no edema.   Left hip wound cdi   Neurological: She is alert. She is disoriented.   Oriented only to self and that's she hospitalized;   Skin: Skin is warm and dry. She is not diaphoretic. No erythema.   Psychiatric: Cognition and memory are impaired. She expresses impulsivity.   Nursing note and vitals reviewed.      Fluids  No  intake or output data in the 24 hours ending 04/26/19 0732                           Assessment/Plan  * Closed fracture of neck of left femur (HCC)- (present on admission)   Assessment & Plan    After a fall, had CT hip showing Acute left femoral neck fracture   Patient underwent left hemiarthroplasty on 3/26/2019.  Continue PT/OT , currently a moderate assist for walker use and ambulation  Staples removed 4/9 and steri strips placed  Awaiting placement       Dysphagia- (present on admission)   Assessment & Plan    Initially failed swallow evaluation.  Per POA, do not place a tube feeding.  Speech therapy, continue pureed diet     Late onset Alzheimer's disease without behavioral disturbance- (present on admission)   Assessment & Plan    Low dose Risperdal  4/26 Reached out to DPOA to discuss goals of care     Moderate protein-calorie malnutrition (HCC)- (present on admission)   Assessment & Plan    Encourage oral intake with puree diet, no tube feeds per POA request     Paroxysmal atrial fibrillation (HCC)- (present on admission)   Assessment & Plan    Patient was found to be on A. fib with RVR, echocardiogram indicate LV EF of 75% and mild MR and TR.  Continue on low-dose metoprolol hold for low blood pressure     Normocytic anemia- (present on admission)   Assessment & Plan    Check intermittently     Hypokalemia- (present on admission)   Assessment & Plan    Replaced        Hypercholesterolemia- (present on admission)   Assessment & Plan    No evidence for statin therapy at this age         No changes to PE/ROS from previous day except as noted above      VTE prophylaxis: heparin

## 2019-04-26 NOTE — THERAPY
"Physical Therapy Treatment completed.   Bed Mobility:  Supine to Sit: Minimal Assist  Transfers: Sit to Stand: Minimal Assist  Gait: Level Of Assist: Minimal Assist with Front-Wheel Walker       Plan of Care: Will benefit from Physical Therapy 4 times per week  Discharge Recommendations: Equipment: Will Continue to Assess for Equipment Needs. Post-acute therapy Recommend inpatient transitional care services for continued physical therapy services.        See \"Rehab Therapy-Acute\" Patient Summary Report for complete documentation.     Pt pleasantly confused but agreeable and wanting to get out of bed. Pt slowly progressing with therapy goals. She was able to ambulate 30ft today with Christi and fww. She required hands on for balance and occasional fww management. She was able to get back to supine position with SPV today and extra time to complete. Will continue to follow while in house to maximize her independence and mobility. Continue to recommend post acute transitional care at [place].   "

## 2019-04-27 LAB
ANION GAP SERPL CALC-SCNC: 8 MMOL/L (ref 0–11.9)
BUN SERPL-MCNC: 53 MG/DL (ref 8–22)
CALCIUM SERPL-MCNC: 9.6 MG/DL (ref 8.5–10.5)
CHLORIDE SERPL-SCNC: 107 MMOL/L (ref 96–112)
CO2 SERPL-SCNC: 28 MMOL/L (ref 20–33)
CREAT SERPL-MCNC: 0.73 MG/DL (ref 0.5–1.4)
ERYTHROCYTE [DISTWIDTH] IN BLOOD BY AUTOMATED COUNT: 51.6 FL (ref 35.9–50)
GLUCOSE SERPL-MCNC: 104 MG/DL (ref 65–99)
HCT VFR BLD AUTO: 40 % (ref 37–47)
HGB BLD-MCNC: 12.7 G/DL (ref 12–16)
MAGNESIUM SERPL-MCNC: 2.1 MG/DL (ref 1.5–2.5)
MCH RBC QN AUTO: 30.8 PG (ref 27–33)
MCHC RBC AUTO-ENTMCNC: 31.8 G/DL (ref 33.6–35)
MCV RBC AUTO: 96.9 FL (ref 81.4–97.8)
PLATELET # BLD AUTO: 175 K/UL (ref 164–446)
PMV BLD AUTO: 10.3 FL (ref 9–12.9)
POTASSIUM SERPL-SCNC: 4.2 MMOL/L (ref 3.6–5.5)
RBC # BLD AUTO: 4.13 M/UL (ref 4.2–5.4)
SODIUM SERPL-SCNC: 143 MMOL/L (ref 135–145)
WBC # BLD AUTO: 6.7 K/UL (ref 4.8–10.8)

## 2019-04-27 PROCEDURE — A9270 NON-COVERED ITEM OR SERVICE: HCPCS | Performed by: HOSPITALIST

## 2019-04-27 PROCEDURE — 99231 SBSQ HOSP IP/OBS SF/LOW 25: CPT | Performed by: INTERNAL MEDICINE

## 2019-04-27 PROCEDURE — 700102 HCHG RX REV CODE 250 W/ 637 OVERRIDE(OP): Performed by: INTERNAL MEDICINE

## 2019-04-27 PROCEDURE — A9270 NON-COVERED ITEM OR SERVICE: HCPCS | Performed by: INTERNAL MEDICINE

## 2019-04-27 PROCEDURE — 85027 COMPLETE CBC AUTOMATED: CPT

## 2019-04-27 PROCEDURE — 80048 BASIC METABOLIC PNL TOTAL CA: CPT

## 2019-04-27 PROCEDURE — A9270 NON-COVERED ITEM OR SERVICE: HCPCS | Performed by: ORTHOPAEDIC SURGERY

## 2019-04-27 PROCEDURE — 83735 ASSAY OF MAGNESIUM: CPT

## 2019-04-27 PROCEDURE — 700111 HCHG RX REV CODE 636 W/ 250 OVERRIDE (IP): Performed by: FAMILY MEDICINE

## 2019-04-27 PROCEDURE — 700102 HCHG RX REV CODE 250 W/ 637 OVERRIDE(OP): Performed by: HOSPITALIST

## 2019-04-27 PROCEDURE — 700112 HCHG RX REV CODE 229: Performed by: ORTHOPAEDIC SURGERY

## 2019-04-27 PROCEDURE — 36415 COLL VENOUS BLD VENIPUNCTURE: CPT

## 2019-04-27 PROCEDURE — 770006 HCHG ROOM/CARE - MED/SURG/GYN SEMI*

## 2019-04-27 RX ORDER — AMOXICILLIN 250 MG
1 CAPSULE ORAL DAILY
Status: DISCONTINUED | OUTPATIENT
Start: 2019-04-28 | End: 2019-05-01

## 2019-04-27 RX ADMIN — DOCUSATE SODIUM 100 MG: 100 CAPSULE, LIQUID FILLED ORAL at 17:52

## 2019-04-27 RX ADMIN — HEPARIN SODIUM 5000 UNITS: 5000 INJECTION, SOLUTION INTRAVENOUS; SUBCUTANEOUS at 20:26

## 2019-04-27 RX ADMIN — METOPROLOL TARTRATE 12.5 MG: 25 TABLET, FILM COATED ORAL at 06:06

## 2019-04-27 RX ADMIN — HEPARIN SODIUM 5000 UNITS: 5000 INJECTION, SOLUTION INTRAVENOUS; SUBCUTANEOUS at 06:06

## 2019-04-27 RX ADMIN — RISPERIDONE 0.5 MG: 0.5 TABLET, FILM COATED ORAL at 06:06

## 2019-04-27 RX ADMIN — RISPERIDONE 0.5 MG: 0.5 TABLET, FILM COATED ORAL at 17:52

## 2019-04-27 NOTE — CARE PLAN
Problem: Safety  Goal: Will remain free from injury    Intervention: Provide assistance with mobility  Patient will call before getting up.      Problem: Respiratory:  Goal: Respiratory status will improve    Intervention: Educate and encourage coughing and deep breathing  Patient will cough and deep breath effectively.

## 2019-04-27 NOTE — PROGRESS NOTES
Hospital Medicine Daily Progress Note    Date of Service  4/27/2019    Chief Complaint  89 y.o. female admitted 3/25/2019 with left femoral neck fracture after ground level fall.    Hospital Course    89 y.o. female who presented 3/25/2019 with past medical history of Parkinson's disease, dementia, osteoporosis, paroxysmal atrial fibrillation, admitted with fall/syncope apparently patient was found on the floor by caretaker. EMS found patient in Afib/RVR. Due to concern for possible sepsis patient had pan CT that showed left-sided neck femoral fracture but no other acute findings, patient received broad-spectrum antibiotics. Hip fracture was surgically repaired on 3/26 with Dr. Brian. She remains confused at her baseline. Awaiting long-term placement. 4/27 Long conversation with DPOA regarding nursing efforts to encourage nutrition, re-orient, as well as provided medical update. I asked CM to contact DPOA as well regarding updates to the discharge plan.      Interval Problem Update  VSS - no clinical changes  Hip CDI  Medically stable  Occasionally impulsive and attempts to leave the bed without calling  Consuming 25-50% of meals  PT/OT treating regularly  Await placement  Last BM documented as 4/24 - doubled senna    Consultants/Specialty  Orthopedic surgery  Palliative care    Code Status  DNR/DNI    Disposition  snf when accepted    Review of Systems  Review of Systems   Unable to perform ROS: Dementia        Physical Exam  Temp:  [36.2 °C (97.1 °F)-36.6 °C (97.9 °F)] 36.5 °C (97.7 °F)  Pulse:  [68-77] 68  Resp:  [15-18] 17  BP: (113-139)/(52-71) 139/71  SpO2:  [93 %-96 %] 93 %    Physical Exam   Eyes: Pupils are equal, round, and reactive to light. Conjunctivae are normal. No scleral icterus.   Neck: No tracheal deviation present.   Cardiovascular: Normal rate and regular rhythm.    No murmur heard.  Pulmonary/Chest: Effort normal and breath sounds normal. No respiratory distress.   Abdominal: Soft. She  exhibits no distension. There is no tenderness.   Musculoskeletal: She exhibits no edema.   Left hip wound cdi   Neurological: She is alert. She is disoriented.   Oriented only to self;   Skin: Skin is warm and dry. She is not diaphoretic. No erythema.   Psychiatric: Cognition and memory are impaired. She expresses impulsivity.   Nursing note and vitals reviewed.      Fluids    Intake/Output Summary (Last 24 hours) at 04/27/19 1018  Last data filed at 04/27/19 0931   Gross per 24 hour   Intake              440 ml   Output                0 ml   Net              440 ml     Recent Labs      04/27/19   0403   SODIUM  143   POTASSIUM  4.2   CHLORIDE  107   CO2  28   GLUCOSE  104*   BUN  53*   CREATININE  0.73   CALCIUM  9.6                          Assessment/Plan  * Closed fracture of neck of left femur (HCC)- (present on admission)   Assessment & Plan    After a fall, had CT hip showing Acute left femoral neck fracture   Patient underwent left hemiarthroplasty on 3/26/2019.  Continue PT/OT , currently a moderate assist for walker use and ambulation  Staples removed 4/9 and steri strips placed  Awaiting placement       Dysphagia- (present on admission)   Assessment & Plan    Initially failed swallow evaluation.  Per POA, do not place a tube feeding.  Speech therapy, continue pureed diet     Late onset Alzheimer's disease without behavioral disturbance- (present on admission)   Assessment & Plan    Low dose Risperdal  4/26 Reached out to DPOA to discuss goals of care     Moderate protein-calorie malnutrition (HCC)- (present on admission)   Assessment & Plan    Encourage oral intake with puree diet, no tube feeds per POA request     Paroxysmal atrial fibrillation (HCC)- (present on admission)   Assessment & Plan    Patient was found to be on A. fib with RVR, echocardiogram indicate LV EF of 75% and mild MR and TR.  Continue on low-dose metoprolol hold for low blood pressure     Normocytic anemia- (present on admission)    Assessment & Plan    Check intermittently     Hypokalemia- (present on admission)   Assessment & Plan    Replaced        Hypercholesterolemia- (present on admission)   Assessment & Plan    No evidence for statin therapy at this age         No changes to PE/ROS from previous day except as noted above      VTE prophylaxis: heparin

## 2019-04-28 PROCEDURE — 770006 HCHG ROOM/CARE - MED/SURG/GYN SEMI*

## 2019-04-28 PROCEDURE — A9270 NON-COVERED ITEM OR SERVICE: HCPCS | Performed by: NURSE PRACTITIONER

## 2019-04-28 PROCEDURE — 700102 HCHG RX REV CODE 250 W/ 637 OVERRIDE(OP): Performed by: HOSPITALIST

## 2019-04-28 PROCEDURE — A9270 NON-COVERED ITEM OR SERVICE: HCPCS | Performed by: HOSPITALIST

## 2019-04-28 PROCEDURE — 700112 HCHG RX REV CODE 229: Performed by: ORTHOPAEDIC SURGERY

## 2019-04-28 PROCEDURE — A9270 NON-COVERED ITEM OR SERVICE: HCPCS | Performed by: INTERNAL MEDICINE

## 2019-04-28 PROCEDURE — A9270 NON-COVERED ITEM OR SERVICE: HCPCS | Performed by: ORTHOPAEDIC SURGERY

## 2019-04-28 PROCEDURE — 700102 HCHG RX REV CODE 250 W/ 637 OVERRIDE(OP): Performed by: INTERNAL MEDICINE

## 2019-04-28 PROCEDURE — 700102 HCHG RX REV CODE 250 W/ 637 OVERRIDE(OP): Performed by: NURSE PRACTITIONER

## 2019-04-28 PROCEDURE — 700111 HCHG RX REV CODE 636 W/ 250 OVERRIDE (IP): Performed by: FAMILY MEDICINE

## 2019-04-28 PROCEDURE — 99232 SBSQ HOSP IP/OBS MODERATE 35: CPT | Performed by: INTERNAL MEDICINE

## 2019-04-28 RX ADMIN — HEPARIN SODIUM 5000 UNITS: 5000 INJECTION, SOLUTION INTRAVENOUS; SUBCUTANEOUS at 04:36

## 2019-04-28 RX ADMIN — RISPERIDONE 0.5 MG: 0.5 TABLET, FILM COATED ORAL at 17:16

## 2019-04-28 RX ADMIN — HEPARIN SODIUM 5000 UNITS: 5000 INJECTION, SOLUTION INTRAVENOUS; SUBCUTANEOUS at 22:02

## 2019-04-28 RX ADMIN — HEPARIN SODIUM 5000 UNITS: 5000 INJECTION, SOLUTION INTRAVENOUS; SUBCUTANEOUS at 15:13

## 2019-04-28 RX ADMIN — RISPERIDONE 0.5 MG: 0.5 TABLET, FILM COATED ORAL at 04:36

## 2019-04-28 RX ADMIN — DOCUSATE SODIUM 100 MG: 100 CAPSULE, LIQUID FILLED ORAL at 17:16

## 2019-04-28 RX ADMIN — SENNOSIDES,DOCUSATE SODIUM 1 TABLET: 8.6; 5 TABLET, FILM COATED ORAL at 04:36

## 2019-04-28 RX ADMIN — METOPROLOL TARTRATE 12.5 MG: 25 TABLET, FILM COATED ORAL at 04:36

## 2019-04-28 RX ADMIN — METOPROLOL TARTRATE 12.5 MG: 25 TABLET, FILM COATED ORAL at 17:15

## 2019-04-28 ASSESSMENT — PAIN SCALES - WONG BAKER: WONGBAKER_NUMERICALRESPONSE: DOESN'T HURT AT ALL

## 2019-04-28 NOTE — CARE PLAN
Problem: Safety  Goal: Will remain free from injury  Outcome: PROGRESSING AS EXPECTED  Proper signs communicated in pts doorway; floor clear of clutter, debris, or cords; pts personal items and call light within reach; pt educated to use call light for assistance    Problem: Skin Integrity  Goal: Risk for impaired skin integrity will decrease  Outcome: PROGRESSING AS EXPECTED  Pt turned and positioned every two hours. Incontinence care provided and barrier paste applied as needed.

## 2019-04-28 NOTE — CARE PLAN
Problem: Safety  Goal: Will remain free from injury  Bed alarm in place pt near nursing station. Lap blt in place, bed in low locked position all DME out of site     Problem: Venous Thromboembolism (VTW)/Deep Vein Thrombosis (DVT) Prevention:  Goal: Patient will participate in Venous Thrombosis (VTE)/Deep Vein Thrombosis (DVT)Prevention Measures  Outcome: PROGRESSING AS EXPECTED

## 2019-04-28 NOTE — PROGRESS NOTES
2 Rn Skin check performed with Barbara STEWART. Bilateral heels pink/red and blanching floated on pillow. Healing scar on left lateral thigh from hip ave.  Sacrum pink and blanching. Blanching Redness with yellow/ brown  unblanchable areas to thoracic spine over bony prominence. Picture taken and bordered foam placed. wound consult placed. Let elbow ref and blanching. bordered foam placed to bilateral elbows. Otherwise skin intact. Waffle over lay in place. Patient able to make large changes in position on own will encourage large changes in position and assist if necessary q 2 hours.

## 2019-04-28 NOTE — PROGRESS NOTES
Hospital Medicine Daily Progress Note    Date of Service  4/28/2019    Chief Complaint  89 y.o. female admitted 3/25/2019 with left femoral neck fracture after ground level fall.    Hospital Course    89 y.o. female who presented 3/25/2019 with past medical history of Parkinson's disease, dementia, osteoporosis, paroxysmal atrial fibrillation, admitted with fall/syncope apparently patient was found on the floor by caretaker. EMS found patient in Afib/RVR. Due to concern for possible sepsis patient had pan CT that showed left-sided neck femoral fracture but no other acute findings, patient received broad-spectrum antibiotics. Hip fracture was surgically repaired on 3/26 with Dr. Brian. She remains confused at her baseline. Awaiting long-term placement. 4/27 Long conversation with DPOA regarding nursing efforts to encourage nutrition, re-orient, as well as provided medical update. I asked CM to contact DPOA as well regarding updates to the discharge plan.      Interval Problem Update  VSS - no clinical changes  Hip CDI  Medically stable  Visitor at bedside  Updated to plan of care    Consultants/Specialty  Orthopedic surgery  Palliative care    Code Status  DNR/DNI    Disposition  snf when accepted    Review of Systems  Review of Systems   Unable to perform ROS: Dementia        Physical Exam  Temp:  [36.6 °C (97.8 °F)-36.9 °C (98.5 °F)] 36.6 °C (97.8 °F)  Pulse:  [72-82] 72  Resp:  [16-17] 17  BP: (105-126)/(43-63) 123/54  SpO2:  [92 %-94 %] 93 %    Physical Exam   Eyes: Pupils are equal, round, and reactive to light. Conjunctivae are normal. No scleral icterus.   Neck: No tracheal deviation present.   Cardiovascular: Normal rate and regular rhythm.    No murmur heard.  Pulmonary/Chest: Effort normal and breath sounds normal. No respiratory distress.   Abdominal: Soft. She exhibits no distension. There is no tenderness.   Musculoskeletal: She exhibits no edema.   Left hip wound cdi   Neurological: She is alert.  She is disoriented.   Oriented only to self;   Skin: Skin is warm and dry. She is not diaphoretic. No erythema.   Psychiatric: Cognition and memory are impaired. She expresses impulsivity.   Nursing note and vitals reviewed.      Fluids    Intake/Output Summary (Last 24 hours) at 04/28/19 1146  Last data filed at 04/28/19 1000   Gross per 24 hour   Intake              250 ml   Output                0 ml   Net              250 ml     Recent Labs      04/27/19   0403   SODIUM  143   POTASSIUM  4.2   CHLORIDE  107   CO2  28   GLUCOSE  104*   BUN  53*   CREATININE  0.73   CALCIUM  9.6                          Assessment/Plan  * Closed fracture of neck of left femur (HCC)- (present on admission)   Assessment & Plan    After a fall, had CT hip showing Acute left femoral neck fracture   Patient underwent left hemiarthroplasty on 3/26/2019.  Continue PT/OT , currently a moderate assist for walker use and ambulation  Staples removed 4/9 and steri strips placed  Awaiting placement       Dysphagia- (present on admission)   Assessment & Plan    Initially failed swallow evaluation.  Per POA, do not place a tube feeding.  Speech therapy, continue pureed diet     Late onset Alzheimer's disease without behavioral disturbance- (present on admission)   Assessment & Plan    Low dose Risperdal  4/26 Reached out to DPOA to discuss goals of care     Moderate protein-calorie malnutrition (HCC)- (present on admission)   Assessment & Plan    Encourage oral intake with puree diet, no tube feeds per POA request     Paroxysmal atrial fibrillation (HCC)- (present on admission)   Assessment & Plan    Patient was found to be on A. fib with RVR, echocardiogram indicate LV EF of 75% and mild MR and TR.  Continue on low-dose metoprolol hold for low blood pressure     Normocytic anemia- (present on admission)   Assessment & Plan    Check intermittently     Hypokalemia- (present on admission)   Assessment & Plan    Replaced         Hypercholesterolemia- (present on admission)   Assessment & Plan    No evidence for statin therapy at this age         No changes to PE/ROS from previous day except as noted above      VTE prophylaxis: heparin

## 2019-04-29 PROCEDURE — A9270 NON-COVERED ITEM OR SERVICE: HCPCS | Performed by: INTERNAL MEDICINE

## 2019-04-29 PROCEDURE — 700102 HCHG RX REV CODE 250 W/ 637 OVERRIDE(OP): Performed by: HOSPITALIST

## 2019-04-29 PROCEDURE — A9270 NON-COVERED ITEM OR SERVICE: HCPCS | Performed by: ORTHOPAEDIC SURGERY

## 2019-04-29 PROCEDURE — 97530 THERAPEUTIC ACTIVITIES: CPT

## 2019-04-29 PROCEDURE — 97116 GAIT TRAINING THERAPY: CPT

## 2019-04-29 PROCEDURE — A9270 NON-COVERED ITEM OR SERVICE: HCPCS | Performed by: NURSE PRACTITIONER

## 2019-04-29 PROCEDURE — 700111 HCHG RX REV CODE 636 W/ 250 OVERRIDE (IP): Performed by: FAMILY MEDICINE

## 2019-04-29 PROCEDURE — 99231 SBSQ HOSP IP/OBS SF/LOW 25: CPT | Performed by: INTERNAL MEDICINE

## 2019-04-29 PROCEDURE — 770006 HCHG ROOM/CARE - MED/SURG/GYN SEMI*

## 2019-04-29 PROCEDURE — 700112 HCHG RX REV CODE 229: Performed by: ORTHOPAEDIC SURGERY

## 2019-04-29 PROCEDURE — A9270 NON-COVERED ITEM OR SERVICE: HCPCS | Performed by: HOSPITALIST

## 2019-04-29 PROCEDURE — 700102 HCHG RX REV CODE 250 W/ 637 OVERRIDE(OP): Performed by: NURSE PRACTITIONER

## 2019-04-29 PROCEDURE — 700102 HCHG RX REV CODE 250 W/ 637 OVERRIDE(OP): Performed by: INTERNAL MEDICINE

## 2019-04-29 RX ADMIN — DOCUSATE SODIUM 100 MG: 100 CAPSULE, LIQUID FILLED ORAL at 18:00

## 2019-04-29 RX ADMIN — SENNOSIDES,DOCUSATE SODIUM 1 TABLET: 8.6; 5 TABLET, FILM COATED ORAL at 06:15

## 2019-04-29 RX ADMIN — DOCUSATE SODIUM 100 MG: 100 CAPSULE, LIQUID FILLED ORAL at 06:15

## 2019-04-29 RX ADMIN — HEPARIN SODIUM 5000 UNITS: 5000 INJECTION, SOLUTION INTRAVENOUS; SUBCUTANEOUS at 15:21

## 2019-04-29 RX ADMIN — METOPROLOL TARTRATE 12.5 MG: 25 TABLET, FILM COATED ORAL at 06:15

## 2019-04-29 RX ADMIN — METOPROLOL TARTRATE 12.5 MG: 25 TABLET, FILM COATED ORAL at 18:00

## 2019-04-29 RX ADMIN — RISPERIDONE 0.5 MG: 0.5 TABLET, FILM COATED ORAL at 06:15

## 2019-04-29 RX ADMIN — RISPERIDONE 0.5 MG: 0.5 TABLET, FILM COATED ORAL at 19:46

## 2019-04-29 RX ADMIN — HEPARIN SODIUM 5000 UNITS: 5000 INJECTION, SOLUTION INTRAVENOUS; SUBCUTANEOUS at 06:15

## 2019-04-29 RX ADMIN — HEPARIN SODIUM 5000 UNITS: 5000 INJECTION, SOLUTION INTRAVENOUS; SUBCUTANEOUS at 22:35

## 2019-04-29 RX ADMIN — QUETIAPINE FUMARATE 50 MG: 25 TABLET ORAL at 22:41

## 2019-04-29 ASSESSMENT — PATIENT HEALTH QUESTIONNAIRE - PHQ9
SUM OF ALL RESPONSES TO PHQ9 QUESTIONS 1 AND 2: 0
2. FEELING DOWN, DEPRESSED, IRRITABLE, OR HOPELESS: NOT AT ALL
1. LITTLE INTEREST OR PLEASURE IN DOING THINGS: NOT AT ALL

## 2019-04-29 ASSESSMENT — PAIN SCALES - PAIN ASSESSMENT IN ADVANCED DEMENTIA (PAINAD)
CONSOLABILITY: UNABLE TO CONSOLE, DISTRACT OR REASSURE
BODYLANGUAGE: RELAXED
CONSOLABILITY: NO NEED TO CONSOLE
FACIALEXPRESSION: SMILING OR INEXPRESSIVE
FACIALEXPRESSION: SAD, FRIGHTENED, FROWN
BODYLANGUAGE: TENSE, DISTRESSED PACING, FIDGETING
TOTALSCORE: 7
TOTALSCORE: 0
CONSOLABILITY: NO NEED TO CONSOLE
BREATHING: OCCASIONAL LABORED BREATHING, SHORT PERIOD OF HYPERVENTILATION
BREATHING: NORMAL
TOTALSCORE: 0
BREATHING: NORMAL
NEGVOCALIZATION: REPEATED TROUBLED CALLING OUT, LOUD MOANING/GROANING, CRYING
BODYLANGUAGE: RELAXED
FACIALEXPRESSION: SMILING OR INEXPRESSIVE

## 2019-04-29 ASSESSMENT — COGNITIVE AND FUNCTIONAL STATUS - GENERAL
TOILETING: A LOT
DRESSING REGULAR UPPER BODY CLOTHING: A LOT
TURNING FROM BACK TO SIDE WHILE IN FLAT BAD: A LOT
PERSONAL GROOMING: A LITTLE
DAILY ACTIVITIY SCORE: 15
HELP NEEDED FOR BATHING: A LOT
SUGGESTED CMS G CODE MODIFIER MOBILITY: CL
MOBILITY SCORE: 10
WALKING IN HOSPITAL ROOM: A LOT
STANDING UP FROM CHAIR USING ARMS: A LITTLE
MOVING FROM LYING ON BACK TO SITTING ON SIDE OF FLAT BED: UNABLE
DRESSING REGULAR LOWER BODY CLOTHING: A LOT
MOVING TO AND FROM BED TO CHAIR: UNABLE
SUGGESTED CMS G CODE MODIFIER DAILY ACTIVITY: CK
CLIMB 3 TO 5 STEPS WITH RAILING: TOTAL

## 2019-04-29 ASSESSMENT — GAIT ASSESSMENTS
DEVIATION: DECREASED BASE OF SUPPORT;BRADYKINETIC;SHUFFLED GAIT
ASSISTIVE DEVICE: FRONT WHEEL WALKER
GAIT LEVEL OF ASSIST: MODERATE ASSIST
DISTANCE (FEET): 25

## 2019-04-29 NOTE — DISCHARGE PLANNING
Anticipated Discharge Disposition: Group Home with HH    Action: LSW left a message with Jossue ([phone]), pt's DPOA, requesting a call back to discuss group homes.     Barriers to Discharge: Discharge placement.     Plan: Awaiting call back from Jossue re: discharge plan.

## 2019-04-29 NOTE — CARE PLAN
Problem: Safety  Goal: Will remain free from injury  Outcome: PROGRESSING AS EXPECTED  Provided assistance with mobility. Fall prevention measures in place. rounds ongoing.    Problem: Venous Thromboembolism (VTW)/Deep Vein Thrombosis (DVT) Prevention:  Goal: Patient will participate in Venous Thrombosis (VTE)/Deep Vein Thrombosis (DVT)Prevention Measures  Outcome: PROGRESSING AS EXPECTED  Pharmacologic prophylaxis given as ordered. Educated on the use of SCDs and importance of mobility for DVT prevention.    Problem: Skin Integrity  Goal: Risk for impaired skin integrity will decrease  Outcome: PROGRESSING AS EXPECTED  Assessed for signs of skin breakdown. Encouraged frequent turns and repositioning to prevent development of pressure ulcers. Q2 turns, foam in use.

## 2019-04-29 NOTE — THERAPY
"Occupational Therapy Treatment completed with focus on ADLs and ADL transfers.  Functional Status:  Max/mod A with ADLs and txfs  Plan of Care: Will benefit from Occupational Therapy 3 times per week  Discharge Recommendations:  Equipment Will Continue to Assess for Equipment Needs. Post-acute therapy Discharge to a transitional care facility for continued therapy services.    See \"Rehab Therapy-Acute\" Patient Summary Report for complete documentation.   "

## 2019-04-29 NOTE — PROGRESS NOTES
· 2 RN skin check complete with TERRY Elam.  · Confirmed skin tear on upper back.  · Scabs on lateral right leg and inner left leg  ·  Wound consult placed and wound reported for back scab  · The following interventions in place: Waffle, heal float boots, elbows padded, upper back mepilex, heal float boots in place, q2 turns

## 2019-04-29 NOTE — THERAPY
"Physical Therapy Treatment completed.   Bed Mobility:  Supine to Sit: Maximal Assist  Transfers: Sit to Stand: Minimal Assist  Gait: Level Of Assist: Moderate Assist with Front-Wheel Walker       Plan of Care: Will benefit from Physical Therapy 4 times per week  Discharge Recommendations: Equipment: Will Continue to Assess for Equipment Needs. Post-acute therapy Recommend inpatient transitional care services for continued physical therapy services.      Pt agreeable to ambulate but very sleepy and required constant cues. Unable to mange FWW when walking, therapist had to navigate it. First half of gait relatively normal, however on path back pt began to scissor and required significant increase in assistance to maintian standing balance. Pt declined to remain up in chair. Continue to recommend post acute placement, likely benefit from long term care. Acute PT to continue to ofollow.     See \"Rehab Therapy-Acute\" Patient Summary Report for complete documentation.       "

## 2019-04-29 NOTE — DISCHARGE PLANNING
Anticipated Discharge Disposition: Group Home with HH and Group Home Waiver/JENNIFER    Action: LSW spoke with Father Jossue ([phone]) who stated that he thought case management was looking at group homes for pt and he was not under the impression that he was supposed to be looking for group homes. 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 LSW requested Father Jossue look for 3 group homes that he would be interested in sending pt to. Father Jossue agreed. Father Jossue also requested the referral be sent to [facility] and [facility]. LSW informed [facility].     LSW spoke with Supervisor Estella who requested a group home waiver application be sent to [facility].     Barriers to Discharge: Discharge Placement.     Plan: Call Father Jossue tomorrow (4/29/19) to discuss group homes. Fill out Community Based Care Referral form.

## 2019-04-29 NOTE — DISCHARGE PLANNING
Anticipated Discharge Disposition: Group Home with     Action: LSW received a voicemail from Father Jossue ([phone]). LSW left a message with Father Jossue requesting a return phone call.     Barriers to Discharge: Discharge placement.     Plan: Awaiting call back from Jossue re: discharge plan.

## 2019-04-29 NOTE — PROGRESS NOTES
Hospital Medicine Daily Progress Note    Date of Service  4/29/2019    Chief Complaint  89 y.o. female admitted 3/25/2019 with left femoral neck fracture after ground level fall.    Hospital Course    89 y.o. female who presented 3/25/2019 with past medical history of Parkinson's disease, dementia, osteoporosis, paroxysmal atrial fibrillation, admitted with fall/syncope apparently patient was found on the floor by caretaker. EMS found patient in Afib/RVR. Due to concern for possible sepsis patient had pan CT that showed left-sided neck femoral fracture but no other acute findings, patient received broad-spectrum antibiotics. Hip fracture was surgically repaired on 3/26 with Dr. Brian. She remains confused at her baseline. Awaiting long-term placement. 4/27 Long conversation with DPOA regarding nursing efforts to encourage nutrition, re-orient, as well as provided medical update. I asked CM to contact DPOA as well regarding updates to the discharge plan.      Interval Problem Update  VSS - no clinical changes  Hip CDI  Medically stable    Consultants/Specialty  Orthopedic surgery  Palliative care    Code Status  DNR/DNI    Disposition  snf when accepted    Review of Systems  Review of Systems   Unable to perform ROS: Dementia        Physical Exam  Temp:  [36.7 °C (98 °F)-36.9 °C (98.4 °F)] 36.7 °C (98.1 °F)  Pulse:  [63-85] 63  Resp:  [16] 16  BP: (101-130)/(50-66) 105/66  SpO2:  [93 %-96 %] 96 %    Physical Exam   Eyes: Pupils are equal, round, and reactive to light. Conjunctivae are normal. No scleral icterus.   Neck: No tracheal deviation present.   Cardiovascular: Normal rate and regular rhythm.    No murmur heard.  Pulmonary/Chest: Effort normal and breath sounds normal. No respiratory distress.   Abdominal: Soft. She exhibits no distension. There is no tenderness.   Musculoskeletal: She exhibits no edema.   Left hip wound cdi   Neurological: She is alert. She is disoriented.   Oriented only to self;   Skin:  Skin is warm and dry. She is not diaphoretic. No erythema.   Psychiatric: Cognition and memory are impaired. She expresses impulsivity.   Nursing note and vitals reviewed.      Fluids    Intake/Output Summary (Last 24 hours) at 04/29/19 1158  Last data filed at 04/29/19 1100   Gross per 24 hour   Intake              500 ml   Output                0 ml   Net              500 ml     Recent Labs      04/27/19   0403   SODIUM  143   POTASSIUM  4.2   CHLORIDE  107   CO2  28   GLUCOSE  104*   BUN  53*   CREATININE  0.73   CALCIUM  9.6                          Assessment/Plan  * Closed fracture of neck of left femur (HCC)- (present on admission)   Assessment & Plan    After a fall, had CT hip showing Acute left femoral neck fracture   Patient underwent left hemiarthroplasty on 3/26/2019.  Continue PT/OT , currently a moderate assist for walker use and ambulation  Staples removed 4/9 and steri strips placed  Awaiting placement       Dysphagia- (present on admission)   Assessment & Plan    Initially failed swallow evaluation.  Per POA, do not place a tube feeding.  Speech therapy, continue pureed diet     Late onset Alzheimer's disease without behavioral disturbance- (present on admission)   Assessment & Plan    Low dose Risperdal  4/26 Reached out to DPOA to discuss goals of care     Moderate protein-calorie malnutrition (HCC)- (present on admission)   Assessment & Plan    Encourage oral intake with puree diet, no tube feeds per POA request     Paroxysmal atrial fibrillation (HCC)- (present on admission)   Assessment & Plan    Patient was found to be on A. fib with RVR, echocardiogram indicate LV EF of 75% and mild MR and TR.  Continue on low-dose metoprolol hold for low blood pressure     Normocytic anemia- (present on admission)   Assessment & Plan    Check intermittently     Hypokalemia- (present on admission)   Assessment & Plan    Replaced        Hypercholesterolemia- (present on admission)   Assessment & Plan    No  evidence for statin therapy at this age         No changes to PE/ROS from previous day except as noted above      VTE prophylaxis: heparin

## 2019-04-29 NOTE — DISCHARGE PLANNING
Agency/Facility Name: [facility]  Outcome: Sent new referrals to the above facilities per LSW Kaylyn.

## 2019-04-30 PROCEDURE — 770006 HCHG ROOM/CARE - MED/SURG/GYN SEMI*

## 2019-04-30 PROCEDURE — 700111 HCHG RX REV CODE 636 W/ 250 OVERRIDE (IP): Performed by: INTERNAL MEDICINE

## 2019-04-30 PROCEDURE — 99231 SBSQ HOSP IP/OBS SF/LOW 25: CPT | Performed by: HOSPITALIST

## 2019-04-30 PROCEDURE — 700102 HCHG RX REV CODE 250 W/ 637 OVERRIDE(OP): Performed by: INTERNAL MEDICINE

## 2019-04-30 PROCEDURE — A9270 NON-COVERED ITEM OR SERVICE: HCPCS | Performed by: INTERNAL MEDICINE

## 2019-04-30 PROCEDURE — 700111 HCHG RX REV CODE 636 W/ 250 OVERRIDE (IP): Performed by: FAMILY MEDICINE

## 2019-04-30 PROCEDURE — 92526 ORAL FUNCTION THERAPY: CPT

## 2019-04-30 PROCEDURE — A9270 NON-COVERED ITEM OR SERVICE: HCPCS | Performed by: HOSPITALIST

## 2019-04-30 PROCEDURE — 700102 HCHG RX REV CODE 250 W/ 637 OVERRIDE(OP): Performed by: HOSPITALIST

## 2019-04-30 RX ADMIN — METOPROLOL TARTRATE 12.5 MG: 25 TABLET, FILM COATED ORAL at 05:23

## 2019-04-30 RX ADMIN — HEPARIN SODIUM 5000 UNITS: 5000 INJECTION, SOLUTION INTRAVENOUS; SUBCUTANEOUS at 05:22

## 2019-04-30 RX ADMIN — CYANOCOBALAMIN 1000 MCG: 1000 INJECTION, SOLUTION INTRAMUSCULAR; SUBCUTANEOUS at 20:53

## 2019-04-30 RX ADMIN — HEPARIN SODIUM 5000 UNITS: 5000 INJECTION, SOLUTION INTRAVENOUS; SUBCUTANEOUS at 20:53

## 2019-04-30 RX ADMIN — RISPERIDONE 0.5 MG: 0.5 TABLET, FILM COATED ORAL at 05:22

## 2019-04-30 RX ADMIN — RISPERIDONE 0.5 MG: 0.5 TABLET, FILM COATED ORAL at 18:05

## 2019-04-30 RX ADMIN — HEPARIN SODIUM 5000 UNITS: 5000 INJECTION, SOLUTION INTRAVENOUS; SUBCUTANEOUS at 15:05

## 2019-04-30 ASSESSMENT — PAIN SCALES - PAIN ASSESSMENT IN ADVANCED DEMENTIA (PAINAD)
TOTALSCORE: 0
BODYLANGUAGE: RELAXED
CONSOLABILITY: NO NEED TO CONSOLE
FACIALEXPRESSION: SMILING OR INEXPRESSIVE
FACIALEXPRESSION: SMILING OR INEXPRESSIVE
CONSOLABILITY: NO NEED TO CONSOLE
TOTALSCORE: 0
TOTALSCORE: 0
CONSOLABILITY: NO NEED TO CONSOLE
BODYLANGUAGE: RELAXED
CONSOLABILITY: NO NEED TO CONSOLE
BREATHING: NORMAL
CONSOLABILITY: NO NEED TO CONSOLE
FACIALEXPRESSION: SMILING OR INEXPRESSIVE
TOTALSCORE: 0
BODYLANGUAGE: RELAXED
BODYLANGUAGE: RELAXED
BREATHING: NORMAL
TOTALSCORE: 0
BODYLANGUAGE: RELAXED
BREATHING: NORMAL
FACIALEXPRESSION: SMILING OR INEXPRESSIVE
BREATHING: NORMAL
BREATHING: NORMAL
FACIALEXPRESSION: SMILING OR INEXPRESSIVE
FACIALEXPRESSION: SMILING OR INEXPRESSIVE
BODYLANGUAGE: RELAXED
FACIALEXPRESSION: SMILING OR INEXPRESSIVE
BREATHING: NORMAL
TOTALSCORE: 0
BREATHING: NORMAL
CONSOLABILITY: NO NEED TO CONSOLE
TOTALSCORE: 0
CONSOLABILITY: NO NEED TO CONSOLE
BODYLANGUAGE: RELAXED

## 2019-04-30 NOTE — WOUND TEAM
"[facility]  Inpatient Services  Initial Wound and Skin Care Evaluation    Admission Date: 3/25/2019     HPI, PMH, SH: Reviewed    Unit where seen by Wound Team: [id number]     WOUND CONSULT RELATED TO:  T-spine redness      SUBJECTIVE:  \"OK\"      Self Report / Pain Level:  No s/s of pain        OBJECTIVE:  In bed, waffle overlay in place, patient kyphotic     WOUND TYPE, LOCATION, CHARACTERISTICS (Pressure Injuries: location, stage, POA or date identified)     Wound 04/27/19 Partial Thickness Wound Back T - spine friction/sheer related (Active)   Site Assessment Red    Katerina-wound Assessment Blanchable erythema    Margins Attached edges    Wound Length (cm) 5 cm    Wound Width (cm) 5 cm    Wound Surface Area (cm^2) 25 cm^2    Tunneling 0 cm    Undermining 0 cm    Closure Secondary intention    Drainage Amount None    Non-staged Wound Description Partial thickness    Treatments Cleansed;Site care    Dressing Options Mepilex    Dressing Changed Reinforced    Dressing Status Clean;Dry;Intact    Dressing Change Frequency Every 72 hrs    NEXT Dressing Change  05/01/19    WOUND NURSE ONLY - Time Spent with Patient (mins) 45      Vascular:    Dorsal Pedal pulses:  NA  Posterior tib pulses:   NA    SNEHAL:      NA    Lab Values:    WBC:       WBC   Date/Time Value Ref Range Status   04/27/2019 04:03 AM 6.7 4.8 - 10.8 K/uL Final     A1C:      Lab Results   Component Value Date/Time    HBA1C 5.9 (H) 03/26/2019 12:30 AM       Culture:   NA    INTERVENTIONS BY WOUND TEAM:  In to see patient for consult of T spine redness, patient turned to left side, sacral mepilex in place peeled back, area is slow to flavia with crust/scab over top, appears shallow, likely partial thickness.  Appear more related to friction sheer likely related to moving patient in bed and the curvature of her spine.  Continue sacral mepilex over bony prominences as able considering incontinence, to protect areas.  Patient turned with pillow to right " side and heels floated.     Dressing selection:  Sacral mepilex          Interdisciplinary consultation: Patient, Bedside RN    EVALUATION: wound likely related friction sheer from moving patient in bed.  Continue current treatment of turning, sacral mepilex to area.    Factors affecting wound healing: malnutrition, dysphasia, alzheimer's   Goals: Steady decrease in wound area and depth weekly.    NURSING PLAN OF CARE ORDERS (X):    Dressing changes: See Dressing Care orders: X  Skin care: See Skin Care orders: X  Rectal tube care: See Rectal Tube Care orders:   Other orders:    RSKIN: CURRENT (X) ORDERED (O):   Q shift Faizan:  X  Q shift pressure point assessments:  X  Pressure redistribution mattress       X     ROGERS          Bariatric ROGERS         Bariatric foam           Heel float boots          Float Heels off Bed with Pillows     X          Barrier wipes         Barrier Cream         Barrier paste        X  Sacral silicone dressing       X to mid spine  Silicone O2 tubing         Anchorfast         Cannula fixation Device (Tender )          Gray Foam Ear protectors           Trach with Optifoam split foam                 Waffle cushion        Waffle Overlay       X  Rectal tube or BMS         Antifungal tx      Interdry          Reposition q 2 hours      X  Up to chair        Ambulate      PT/OT        Dietician        Diabetes Education      PO  X   TF     TPN     NPO   # days   Other        WOUND TEAM PLAN OF CARE (X):   NPWT change 3 x week:        Dressing changes by wound team:       Follow up as needed:     X  Other (explain):     Anticipated discharge plans (X): group home with HH - wound likely to resolve on it's own   SNF:           Home Care:           Outpatient Wound Center:            Self Care:            Other:

## 2019-04-30 NOTE — CARE PLAN
Problem: Mobility  Goal: Risk for activity intolerance will decrease  Outcome: PROGRESSING AS EXPECTED  Pt ambulating to and from bathroom and in the halls

## 2019-04-30 NOTE — PROGRESS NOTES
Hospital Medicine Daily Progress Note    Date of Service  4/30/2019    Chief Complaint  89 y.o. female admitted 3/25/2019 with left femoral neck fracture after ground level fall.    Hospital Course    89 y.o. female who presented 3/25/2019 with past medical history of Parkinson's disease, dementia, osteoporosis, paroxysmal atrial fibrillation, admitted with fall/syncope apparently patient was found on the floor by caretaker. EMS found patient in Afib/RVR. Due to concern for possible sepsis patient had pan CT that showed left-sided neck femoral fracture but no other acute findings, patient received broad-spectrum antibiotics. Hip fracture was surgically repaired on 3/26 with Dr. Brian. She remains confused at her baseline. Awaiting long-term placement. 4/27 Long conversation with DPOA regarding nursing efforts to encourage nutrition, re-orient, as well as provided medical update. I asked CM to contact DPOA as well regarding updates to the discharge plan.      Interval Problem Update  VSS - no clinical changes  Hip CDI  Skin tear/shear injury to upper back/T-spine  Wound care recommending care parameters to nursing  Medically stable    Consultants/Specialty  Orthopedic surgery  Palliative care    Code Status  DNR/DNI    Disposition  snf when accepted    Review of Systems  Review of Systems   Unable to perform ROS: Dementia        Physical Exam  Temp:  [36.6 °C (97.8 °F)-37 °C (98.6 °F)] 36.6 °C (97.8 °F)  Pulse:  [63-76] 63  Resp:  [16-17] 16  BP: (118-140)/(50-62) 139/62  SpO2:  [92 %-95 %] 95 %    Physical Exam   Eyes: Pupils are equal, round, and reactive to light. Conjunctivae are normal. No scleral icterus.   Neck: No tracheal deviation present.   Cardiovascular: Normal rate and regular rhythm.    No murmur heard.  Pulmonary/Chest: Effort normal and breath sounds normal. No respiratory distress.   Abdominal: Soft. She exhibits no distension. There is no tenderness.   Musculoskeletal: She exhibits no edema.    Left hip wound cdi   Neurological: She is alert. She is disoriented.   Oriented only to self;   Skin: Skin is warm and dry. She is not diaphoretic. No erythema.   Psychiatric: Cognition and memory are impaired. She expresses impulsivity.   Nursing note and vitals reviewed.      Fluids    Intake/Output Summary (Last 24 hours) at 04/30/19 1100  Last data filed at 04/30/19 1000   Gross per 24 hour   Intake              740 ml   Output                0 ml   Net              740 ml                              Assessment/Plan  * Closed fracture of neck of left femur (HCC)- (present on admission)   Assessment & Plan    After a fall, had CT hip showing Acute left femoral neck fracture   Patient underwent left hemiarthroplasty on 3/26/2019.  Continue PT/OT , currently a moderate assist for walker use and ambulation  Staples removed 4/9 and steri strips placed  Awaiting placement       Dysphagia- (present on admission)   Assessment & Plan    Initially failed swallow evaluation.  Per POA, do not place a tube feeding.  Speech therapy, continue pureed diet     Late onset Alzheimer's disease without behavioral disturbance- (present on admission)   Assessment & Plan    Low dose Risperdal  4/26 Reached out to DPOA to discuss goals of care     Moderate protein-calorie malnutrition (HCC)- (present on admission)   Assessment & Plan    Encourage oral intake with puree diet, no tube feeds per POA request     Paroxysmal atrial fibrillation (HCC)- (present on admission)   Assessment & Plan    Patient was found to be on A. fib with RVR, echocardiogram indicate LV EF of 75% and mild MR and TR.  Continue on low-dose metoprolol hold for low blood pressure     Normocytic anemia- (present on admission)   Assessment & Plan    Check intermittently     Hypokalemia- (present on admission)   Assessment & Plan    Replaced        Hypercholesterolemia- (present on admission)   Assessment & Plan    No evidence for statin therapy at this age          No changes to PE/ROS from previous day except as noted above      VTE prophylaxis: heparin

## 2019-04-30 NOTE — CARE PLAN
Problem: Knowledge Deficit  Goal: Knowledge of the prescribed therapeutic regimen will improve  Outcome: PROGRESSING SLOWER THAN EXPECTED  Reviewed POC including safety, mobility, pain management, medication administration, DVT prophylaxis, and discharge. Reinforcement needed. No learning evidence at this time.     Problem: Fluid Volume:  Goal: Will maintain balanced intake and output  Outcome: PROGRESSING SLOWER THAN EXPECTED  Pt needs frequent encouragement to take in oral fluids. Pt voiding appropriately, but pt is incontinent of urine.

## 2019-04-30 NOTE — CARE PLAN
Problem: Pain Management  Goal: Pain level will decrease to patient's comfort goal  Outcome: PROGRESSING SLOWER THAN EXPECTED  Pt denies pain

## 2019-04-30 NOTE — PROGRESS NOTES
2 RN skin check complete with Nida RN  SCDs in place   Skin assessed under SCDs  New potential pressure ulcers noted on upper back  Wound consult to be placed  The following interventions in place q2h turns, waffle cushion, mepilex to back, pillows in use for support and positioning, barrier cream, and pillows used to float heels. Mepilex to sacrum contraindicated due to frequent incontinence.

## 2019-04-30 NOTE — DISCHARGE PLANNING
Anticipated Discharge Disposition: Long Term SNF    Action: LSW spoke with Ariadna ([phone]) with [facility]. Ariadna stated that they will accept pt and are able to take her today. LSW spoke with Father Jossue ([phone]) who agreed to transport to [facility]. LSW faxed transportation request to [facility].     Barriers to Discharge: None    Plan: Awaiting transport time. Complete COBRA packet.

## 2019-04-30 NOTE — DISCHARGE PLANNING
Anticipated Discharge Disposition: Group home with     Action: LSW spoke with [facility] who stated they are not showing pt as Medicaid FFS. LSW spoke with [facility] who stated that pt has [employer] insurance, which is Medicaid FFS.    LSW informed Judith with [facility]. Judith stated they can help pt get the insurance they need to accept pt but will need at least 45 days to complete this. Judiht stated they can work with hospital as long as she gets approval from her supervisors. Judith stated she would talk with them today.     LSW spoke with Father Jossue and informed him of this. Father Jossue also stated that he went to [facility] today to visit someone and he is interested in sending pt there if [facility] does not work out.     Barriers to Discharge: Discharge placement.     Plan: Discuss pt with supervisors during discharge planning rounds. Follow-up with [facility] re: if they can take pt with the anticipation of getting Medicaid FFS in place.

## 2019-04-30 NOTE — THERAPY
"Speech Language Therapy dysphagia treatment completed.     Functional Status:  Pt seen for dysphagia tx this date. Pt A&Ox1 only today, with confused and confabulatory speech noted.  She followed swallow simple 1-2 step directives with minimal cueing.  RN reports pt was feeding herself this morning, however at lunch she did not initiate self-feeding.  Pt continues to have intermittent wet vocal quality with all textures (puree, cup sips of NTL and tsps of soup), and had coughing x1 s/p cup sip of nectars, which could be indicative of possible penetration or aspiration.  Pt required min to mod cueing to consistently use swallow precautions, including a throat clear which minimally helped to improve vocal quality.  Pt only willing to eat minimal amounts, reporting she was full. Recommend continuing current diet (dysphagia I with nectars), despite risk for aspiration, with 1:1 supervision to ensure use of safe swallow strategies as posted.     Recommendations: Continue dysphagia I diet with nectars, despite risk for aspiration, with 1:1 supervision     Plan of Care: Will benefit from Speech Therapy 3 times per week    Post-Acute Therapy: Recommend inpatient transitional care services for continued speech therapy services.      See \"Rehab Therapy-Acute\" Patient Summary Report for complete documentation.     "

## 2019-05-01 PROBLEM — T14.8XXA NONTRAUMATIC TEAR OF SKIN: Status: ACTIVE | Noted: 2019-05-01

## 2019-05-01 PROCEDURE — 700111 HCHG RX REV CODE 636 W/ 250 OVERRIDE (IP): Performed by: HOSPITALIST

## 2019-05-01 PROCEDURE — A9270 NON-COVERED ITEM OR SERVICE: HCPCS | Performed by: HOSPITALIST

## 2019-05-01 PROCEDURE — 770006 HCHG ROOM/CARE - MED/SURG/GYN SEMI*

## 2019-05-01 PROCEDURE — 700112 HCHG RX REV CODE 229: Performed by: HOSPITALIST

## 2019-05-01 PROCEDURE — 97530 THERAPEUTIC ACTIVITIES: CPT

## 2019-05-01 PROCEDURE — 700111 HCHG RX REV CODE 636 W/ 250 OVERRIDE (IP): Performed by: FAMILY MEDICINE

## 2019-05-01 PROCEDURE — 700102 HCHG RX REV CODE 250 W/ 637 OVERRIDE(OP): Performed by: INTERNAL MEDICINE

## 2019-05-01 PROCEDURE — 99231 SBSQ HOSP IP/OBS SF/LOW 25: CPT | Performed by: HOSPITALIST

## 2019-05-01 PROCEDURE — 700102 HCHG RX REV CODE 250 W/ 637 OVERRIDE(OP): Performed by: NURSE PRACTITIONER

## 2019-05-01 PROCEDURE — 700102 HCHG RX REV CODE 250 W/ 637 OVERRIDE(OP): Performed by: HOSPITALIST

## 2019-05-01 PROCEDURE — 97116 GAIT TRAINING THERAPY: CPT

## 2019-05-01 PROCEDURE — A9270 NON-COVERED ITEM OR SERVICE: HCPCS | Performed by: NURSE PRACTITIONER

## 2019-05-01 PROCEDURE — A9270 NON-COVERED ITEM OR SERVICE: HCPCS | Performed by: INTERNAL MEDICINE

## 2019-05-01 RX ORDER — POLYETHYLENE GLYCOL 3350 17 G/17G
1 POWDER, FOR SOLUTION ORAL 2 TIMES DAILY PRN
Status: DISCONTINUED | OUTPATIENT
Start: 2019-05-01 | End: 2019-05-11 | Stop reason: HOSPADM

## 2019-05-01 RX ORDER — ONDANSETRON 4 MG/1
4 TABLET, ORALLY DISINTEGRATING ORAL EVERY 4 HOURS PRN
Status: DISCONTINUED | OUTPATIENT
Start: 2019-05-01 | End: 2019-05-11 | Stop reason: HOSPADM

## 2019-05-01 RX ORDER — HALOPERIDOL 5 MG/ML
1 INJECTION INTRAMUSCULAR EVERY 6 HOURS PRN
Status: DISCONTINUED | OUTPATIENT
Start: 2019-05-01 | End: 2019-05-01

## 2019-05-01 RX ORDER — HEPARIN SODIUM 5000 [USP'U]/ML
5000 INJECTION, SOLUTION INTRAVENOUS; SUBCUTANEOUS EVERY 8 HOURS
Status: DISCONTINUED | OUTPATIENT
Start: 2019-05-01 | End: 2019-05-11 | Stop reason: HOSPADM

## 2019-05-01 RX ORDER — DIPHENHYDRAMINE HYDROCHLORIDE 50 MG/ML
25 INJECTION INTRAMUSCULAR; INTRAVENOUS EVERY 6 HOURS PRN
Status: DISCONTINUED | OUTPATIENT
Start: 2019-05-01 | End: 2019-05-01

## 2019-05-01 RX ORDER — ONDANSETRON 2 MG/ML
4 INJECTION INTRAMUSCULAR; INTRAVENOUS EVERY 4 HOURS PRN
Status: DISCONTINUED | OUTPATIENT
Start: 2019-05-01 | End: 2019-05-11 | Stop reason: HOSPADM

## 2019-05-01 RX ORDER — AMOXICILLIN 250 MG
1 CAPSULE ORAL
Status: DISCONTINUED | OUTPATIENT
Start: 2019-05-01 | End: 2019-05-11 | Stop reason: HOSPADM

## 2019-05-01 RX ORDER — ENEMA 19; 7 G/133ML; G/133ML
1 ENEMA RECTAL
Status: DISCONTINUED | OUTPATIENT
Start: 2019-05-01 | End: 2019-05-11 | Stop reason: HOSPADM

## 2019-05-01 RX ORDER — CYANOCOBALAMIN 1000 UG/ML
1000 INJECTION, SOLUTION INTRAMUSCULAR; SUBCUTANEOUS
Status: DISCONTINUED | OUTPATIENT
Start: 2019-05-30 | End: 2019-05-11 | Stop reason: HOSPADM

## 2019-05-01 RX ORDER — HALOPERIDOL 2 MG/ML
2 SOLUTION ORAL EVERY 6 HOURS PRN
Status: DISCONTINUED | OUTPATIENT
Start: 2019-05-01 | End: 2019-05-01

## 2019-05-01 RX ORDER — RISPERIDONE 0.5 MG/1
0.5 TABLET ORAL 2 TIMES DAILY
Status: DISCONTINUED | OUTPATIENT
Start: 2019-05-01 | End: 2019-05-02

## 2019-05-01 RX ORDER — DOCUSATE SODIUM 100 MG/1
100 CAPSULE, LIQUID FILLED ORAL 2 TIMES DAILY
Status: DISCONTINUED | OUTPATIENT
Start: 2019-05-01 | End: 2019-05-11 | Stop reason: HOSPADM

## 2019-05-01 RX ORDER — AMOXICILLIN 250 MG
1 CAPSULE ORAL DAILY
Status: DISCONTINUED | OUTPATIENT
Start: 2019-05-02 | End: 2019-05-11 | Stop reason: HOSPADM

## 2019-05-01 RX ORDER — QUETIAPINE FUMARATE 25 MG/1
50 TABLET, FILM COATED ORAL EVERY 6 HOURS PRN
Status: DISCONTINUED | OUTPATIENT
Start: 2019-05-01 | End: 2019-05-11 | Stop reason: HOSPADM

## 2019-05-01 RX ORDER — BISACODYL 10 MG
10 SUPPOSITORY, RECTAL RECTAL
Status: DISCONTINUED | OUTPATIENT
Start: 2019-05-01 | End: 2019-05-11 | Stop reason: HOSPADM

## 2019-05-01 RX ORDER — SCOLOPAMINE TRANSDERMAL SYSTEM 1 MG/1
1 PATCH, EXTENDED RELEASE TRANSDERMAL
Status: DISCONTINUED | OUTPATIENT
Start: 2019-05-01 | End: 2019-05-01

## 2019-05-01 RX ORDER — OXYCODONE HYDROCHLORIDE 5 MG/1
2.5 TABLET ORAL
Status: DISCONTINUED | OUTPATIENT
Start: 2019-05-01 | End: 2019-05-11 | Stop reason: HOSPADM

## 2019-05-01 RX ADMIN — METOPROLOL TARTRATE 12.5 MG: 25 TABLET ORAL at 17:05

## 2019-05-01 RX ADMIN — OXYCODONE HYDROCHLORIDE 2.5 MG: 5 TABLET ORAL at 05:41

## 2019-05-01 RX ADMIN — RISPERIDONE 0.5 MG: 0.5 TABLET ORAL at 17:05

## 2019-05-01 RX ADMIN — DOCUSATE SODIUM 100 MG: 100 CAPSULE, LIQUID FILLED ORAL at 17:05

## 2019-05-01 RX ADMIN — RISPERIDONE 0.5 MG: 0.5 TABLET, FILM COATED ORAL at 05:35

## 2019-05-01 RX ADMIN — HEPARIN SODIUM 5000 UNITS: 5000 INJECTION, SOLUTION INTRAVENOUS; SUBCUTANEOUS at 05:35

## 2019-05-01 RX ADMIN — METOPROLOL TARTRATE 12.5 MG: 25 TABLET, FILM COATED ORAL at 05:35

## 2019-05-01 RX ADMIN — HEPARIN SODIUM 5000 UNITS: 5000 INJECTION, SOLUTION INTRAVENOUS; SUBCUTANEOUS at 14:06

## 2019-05-01 RX ADMIN — SENNOSIDES,DOCUSATE SODIUM 1 TABLET: 8.6; 5 TABLET, FILM COATED ORAL at 05:35

## 2019-05-01 RX ADMIN — HEPARIN SODIUM 5000 UNITS: 5000 INJECTION, SOLUTION INTRAVENOUS; SUBCUTANEOUS at 22:55

## 2019-05-01 ASSESSMENT — PAIN SCALES - PAIN ASSESSMENT IN ADVANCED DEMENTIA (PAINAD)
BREATHING: NORMAL
CONSOLABILITY: NO NEED TO CONSOLE
BREATHING: NORMAL
CONSOLABILITY: NO NEED TO CONSOLE
TOTALSCORE: 0
BODYLANGUAGE: RELAXED
BREATHING: NORMAL
FACIALEXPRESSION: SMILING OR INEXPRESSIVE
BREATHING: NORMAL
BODYLANGUAGE: RELAXED
TOTALSCORE: 0
FACIALEXPRESSION: SMILING OR INEXPRESSIVE
NEGVOCALIZATION: REPEATED TROUBLED CALLING OUT, LOUD MOANING/GROANING, CRYING
TOTALSCORE: 0
BREATHING: NOISY LABORED BREATHING, LONG PERIODS OF HYPERVENTILATION, CHEYNE-STOKES RESPIRATIONS
CONSOLABILITY: DISTRACTED OR REASSURED BY VOICE/TOUCH
BODYLANGUAGE: RELAXED
FACIALEXPRESSION: SMILING OR INEXPRESSIVE
TOTALSCORE: 0
BREATHING: NORMAL
TOTALSCORE: 0
BREATHING: NORMAL
FACIALEXPRESSION: SMILING OR INEXPRESSIVE
BODYLANGUAGE: TENSE, DISTRESSED PACING, FIDGETING
TOTALSCORE: 0
FACIALEXPRESSION: SMILING OR INEXPRESSIVE
BODYLANGUAGE: RELAXED
FACIALEXPRESSION: SMILING OR INEXPRESSIVE
CONSOLABILITY: NO NEED TO CONSOLE
BODYLANGUAGE: RELAXED
TOTALSCORE: 8
FACIALEXPRESSION: SMILING OR INEXPRESSIVE
CONSOLABILITY: NO NEED TO CONSOLE
BREATHING: NORMAL
TOTALSCORE: 0
BODYLANGUAGE: RELAXED
CONSOLABILITY: NO NEED TO CONSOLE
FACIALEXPRESSION: FACIAL GRIMACING
BODYLANGUAGE: RELAXED
CONSOLABILITY: NO NEED TO CONSOLE
CONSOLABILITY: NO NEED TO CONSOLE

## 2019-05-01 ASSESSMENT — COGNITIVE AND FUNCTIONAL STATUS - GENERAL
MOVING TO AND FROM BED TO CHAIR: A LOT
TURNING FROM BACK TO SIDE WHILE IN FLAT BAD: A LOT
SUGGESTED CMS G CODE MODIFIER MOBILITY: CL
WALKING IN HOSPITAL ROOM: A LOT
CLIMB 3 TO 5 STEPS WITH RAILING: TOTAL
MOBILITY SCORE: 11
MOVING FROM LYING ON BACK TO SITTING ON SIDE OF FLAT BED: UNABLE
STANDING UP FROM CHAIR USING ARMS: A LITTLE

## 2019-05-01 ASSESSMENT — GAIT ASSESSMENTS
ASSISTIVE DEVICE: FRONT WHEEL WALKER
GAIT LEVEL OF ASSIST: MODERATE ASSIST
DISTANCE (FEET): 20
DEVIATION: DECREASED BASE OF SUPPORT;SHUFFLED GAIT;BRADYKINETIC

## 2019-05-01 NOTE — CARE PLAN
Problem: Bowel/Gastric:  Goal: Normal bowel function is maintained or improved  Outcome: PROGRESSING AS EXPECTED  Pt had a bowel movement 4/30. Bowel sounds normoactive in all four quadrants.     Problem: Knowledge Deficit  Goal: Knowledge of disease process/condition, treatment plan, diagnostic tests, and medications will improve  Outcome: PROGRESSING SLOWER THAN EXPECTED  Reviewed POC including safety, mobility, pain management, medication administration, DVT prophylaxis, and discharge. Reinforcement needed. No learning evidence at this time.

## 2019-05-01 NOTE — DISCHARGE PLANNING
Anticipated Discharge Disposition: Long term SNF    Action: LSW left a message with Father Jossue ([phone]) informing him of possible call from [facility] re: pt's insurance.     Barriers to Discharge: Discharge placement.     Plan: Continue to discuss pt with leadership.

## 2019-05-01 NOTE — THERAPY
"Physical Therapy Treatment completed.   Bed Mobility:  Supine to Sit: Moderate Assist  Transfers: Sit to Stand: Minimal Assist  Gait: Level Of Assist: Moderate Assist with Front-Wheel Walker       Plan of Care: Will benefit from Physical Therapy 4 times per week  Discharge Recommendations: Equipment: Will Continue to Assess for Equipment Needs. Post-acute therapy Discharge to a transitional care facility for continued skilled therapy services.     See \"Rehab Therapy-Acute\" Patient Summary Report for complete documentation.     Pt pleasant and agreeable to therapy but continues to be limited due to fatigue and is requiring assist for all mobility. During first part of gait she required modA for balance only and improved pushing fww forward. As well responded to verbal cues of taking large steps. As pt fatigued she required more assist for fww managment. She all of a sudden stops and attempts to sit where there is no chair with therpaist assisting at this point. Rn present and pulled up chair for pt to sit and rest. Performed stand step pivot from chair back to bed with Christi, use of fww and moderate verbal cues and tactile cues for sequencing and hand placement. Will continue to recommend post acute placement and long term care. Will continue to follow while in house.  "

## 2019-05-01 NOTE — DISCHARGE PLANNING
Anticipated Discharge Disposition: Long Term SNF    Action: LSW spoke with leadership in rounds. LSW explained pt's situation. Leadership suggested starting pt's insurance transfer from [facility] to Medicaid FFS. Once Medicaid FFS is in process, pt will be able to transport to [place] with an agreement to pay for first few months of pt's stay. Leadership stated that [facility] will start this process and inform LSW of when to start the process of transporting pt over to [place].     Barriers to Discharge: Discharge placement.     Plan: Update Father Jossue on pt's status.

## 2019-05-01 NOTE — CARE PLAN
Problem: Safety  Goal: Will remain free from falls  Outcome: PROGRESSING AS EXPECTED  Pt. Has bed in lowest position, locked, three side rails up, checking on pt. Hourly, bed close to nursing station    Problem: Infection  Goal: Will remain free from infection  Outcome: PROGRESSING AS EXPECTED  Pt. Afebrile, educated about handwashing, cleaning pt. Due to incontinence and changing to new peripads, pt. Educated about signs and symptoms of infection

## 2019-05-01 NOTE — PROGRESS NOTES
Hospital Medicine Daily Progress Note    Date of Service  5/1/2019    Chief Complaint  89 y.o. female admitted 3/25/2019 with left femoral neck fracture after ground level fall.    Hospital Course    89 y.o. female who presented 3/25/2019 with past medical history of Parkinson's disease, dementia, osteoporosis, paroxysmal atrial fibrillation, admitted with fall/syncope apparently patient was found on the floor by caretaker. EMS found patient in Afib/RVR. Due to concern for possible sepsis patient had pan CT that showed left-sided neck femoral fracture but no other acute findings, patient received broad-spectrum antibiotics. Hip fracture was surgically repaired on 3/26 with Dr. Brian. She remains confused at her baseline. Awaiting long-term placement. 4/27 Long conversation with DPOA regarding nursing efforts to encourage nutrition, re-orient, as well as provided medical update. I asked CM to contact DPOA as well regarding updates to the discharge plan.      Interval Problem Update  VSS - no clinical changes  Hip CDI  Medically stable    Consultants/Specialty  Orthopedic surgery  Palliative care    Code Status  DNR/DNI    Disposition  snf when accepted    Review of Systems  Review of Systems   Unable to perform ROS: Dementia        Physical Exam  Temp:  [36.4 °C (97.6 °F)-37.1 °C (98.7 °F)] 37 °C (98.6 °F)  Pulse:  [56-79] 66  Resp:  [16-18] 18  BP: (111-141)/(50-68) 121/50  SpO2:  [90 %-95 %] 92 %    Physical Exam   Eyes: Pupils are equal, round, and reactive to light. Conjunctivae are normal. No scleral icterus.   Neck: No tracheal deviation present.   Cardiovascular: Normal rate and regular rhythm.    No murmur heard.  Pulmonary/Chest: Effort normal and breath sounds normal. No respiratory distress.   Abdominal: Soft. She exhibits no distension. There is no tenderness.   Musculoskeletal: She exhibits no edema.   Left hip wound cdi   Neurological: She is alert. She is disoriented.   Oriented only to self;    Skin: Skin is warm and dry. She is not diaphoretic. No erythema.   Psychiatric: Cognition and memory are impaired. She expresses impulsivity.   Nursing note and vitals reviewed.      Fluids    Intake/Output Summary (Last 24 hours) at 05/01/19 1304  Last data filed at 04/30/19 1455   Gross per 24 hour   Intake              360 ml   Output                0 ml   Net              360 ml                              Assessment/Plan  * Closed fracture of neck of left femur (HCC)- (present on admission)   Assessment & Plan    After a fall, had CT hip showing Acute left femoral neck fracture   Patient underwent left hemiarthroplasty on 3/26/2019.  Continue PT/OT , currently a moderate assist for walker use and ambulation  Staples removed 4/9 and steri strips placed  Awaiting placement       Dysphagia- (present on admission)   Assessment & Plan    Initially failed swallow evaluation.  Per POA, do not place a tube feeding.  Speech therapy, continue pureed diet     Late onset Alzheimer's disease without behavioral disturbance- (present on admission)   Assessment & Plan    Low dose Risperdal  4/26 Reached out to DPOA to discuss goals of care     Moderate protein-calorie malnutrition (HCC)- (present on admission)   Assessment & Plan    Encourage oral intake with puree diet, no tube feeds per POA request     Paroxysmal atrial fibrillation (HCC)- (present on admission)   Assessment & Plan    Patient was found to be on A. fib with RVR, echocardiogram indicate LV EF of 75% and mild MR and TR.  Continue on low-dose metoprolol hold for low blood pressure     Nontraumatic tear of skin   Assessment & Plan    T-spine  RN to turn Q2  Caution with lifting  Encourage PO     Normocytic anemia- (present on admission)   Assessment & Plan    Check intermittently     Hypokalemia- (present on admission)   Assessment & Plan    Replaced        Hypercholesterolemia- (present on admission)   Assessment & Plan    No evidence for statin therapy at  this age         No changes to PE/ROS from previous day except as noted above      VTE prophylaxis: heparin

## 2019-05-01 NOTE — PROGRESS NOTES
Pharmacy Pharmacotherapy Consult for LOS >30 days    Admit Date: 3/25/2019      Medications were reviewed for appropriateness and ongoing need.     Current Facility-Administered Medications   Medication Dose Route Frequency Provider Last Rate Last Dose   • senna-docusate (PERICOLACE or SENOKOT S) 8.6-50 MG per tablet 1 Tab  1 Tab Oral DAILY OLAMIDE Jimenez   1 Tab at 05/01/19 0535   • haloperidol lactate (HALDOL) injection 1 mg  1 mg Intramuscular Q6HRS PRN Dex Gerard M.D.       • haloperidol (HALDOL) 2 MG/ML solution 2 mg  2 mg Oral Q6HRS PRN Dex Gerard M.D.       • QUEtiapine (SEROQUEL) tablet 50 mg  50 mg Oral Q6HRS PRN John Phelps M.D.   Stopped at 04/29/19 2242   • cyanocobalamin (VITAMIN B-12) injection 1,000 mcg  1,000 mcg Intramuscular Q30 DAYS JUAN R ProOYasmin   1,000 mcg at 04/30/19 2053   • risperiDONE (RISPERDAL) tablet 0.5 mg  0.5 mg Oral BID Paul Boyle D.O.   0.5 mg at 05/01/19 0535   • heparin injection 5,000 Units  5,000 Units Subcutaneous Q8HRS Aleah Lucia M.D.   5,000 Units at 05/01/19 1406   • ondansetron (ZOFRAN) syringe/vial injection 4 mg  4 mg Intravenous Q4HRS PRN Arsenio Lewis M.D.       • diphenhydrAMINE (BENADRYL) injection 25 mg  25 mg Intravenous Q6HRS PRN Arsenio Lewis M.D.   Stopped at 03/26/19 2241   • scopolamine (TRANSDERM-SCOP) patch 1 Patch  1 Patch Transdermal Q72HRS PRN Arsenio Lewis M.D.       • docusate sodium (COLACE) capsule 100 mg  100 mg Oral BID Arsenio Lewis M.D.   Stopped at 04/30/19 0600   • senna-docusate (PERICOLACE or SENOKOT S) 8.6-50 MG per tablet 1 Tab  1 Tab Oral Q24HRS PRN Arsenio Lewis M.D.   Stopped at 03/29/19 2220   • polyethylene glycol/lytes (MIRALAX) PACKET 1 Packet  1 Packet Oral BID PRN Arsenio Lewis M.D.       • magnesium hydroxide (MILK OF MAGNESIA) suspension 30 mL  30 mL Oral QDAY PRN Arsenio Lewis M.D.       • bisacodyl (DULCOLAX) suppository 10 mg  10 mg Rectal Q24HRS PRN Arsenio Lewis M.D.        • fleet enema 133 mL  1 Each Rectal Once PRN Arsenio Lewis M.D.       • Respiratory Care per Protocol   Nebulization Continuous RT Clement Hensley M.D.       • oxyCODONE immediate-release (ROXICODONE) tablet 2.5 mg  2.5 mg Oral Q3HRS PRN Clement Hensley M.D.   2.5 mg at 05/01/19 0541   • ondansetron (ZOFRAN ODT) dispertab 4 mg  4 mg Oral Q4HRS PRN Clement Hensley M.D.       • metoprolol (LOPRESSOR) tablet 12.5 mg  12.5 mg Oral TWICE DAILY Clement Hensley M.D.   12.5 mg at 05/01/19 0535       Recommendations:  30 day medication review completed . Discussed with Dr Phelps. Benadryl, Haldol, and scopalamine patch to be discontinued as patient hasn't required since admission.    Edith Urbina, PharmD

## 2019-05-01 NOTE — PROGRESS NOTES
Spoke to  Kaylyn about discharge plan, is still awaiting to hear approval from supervisor about insurance. Will continue to update family about discharge plan

## 2019-05-01 NOTE — PROGRESS NOTES
· 2 RN skin check complete with TERRY Herrmann.  · Confirmed wound on upper back, mepilex in place on upper back  · Red spots (blanching) on left elbow and right knee  · The following interventions in place: q2 turns, heels floated, promoting nutrition and changing when incontinent, pillows under arms and elbows

## 2019-05-02 PROCEDURE — A9270 NON-COVERED ITEM OR SERVICE: HCPCS | Performed by: HOSPITALIST

## 2019-05-02 PROCEDURE — A9270 NON-COVERED ITEM OR SERVICE: HCPCS | Performed by: NURSE PRACTITIONER

## 2019-05-02 PROCEDURE — 700102 HCHG RX REV CODE 250 W/ 637 OVERRIDE(OP): Performed by: NURSE PRACTITIONER

## 2019-05-02 PROCEDURE — 700102 HCHG RX REV CODE 250 W/ 637 OVERRIDE(OP): Performed by: HOSPITALIST

## 2019-05-02 PROCEDURE — 770006 HCHG ROOM/CARE - MED/SURG/GYN SEMI*

## 2019-05-02 PROCEDURE — 99231 SBSQ HOSP IP/OBS SF/LOW 25: CPT | Performed by: HOSPITALIST

## 2019-05-02 PROCEDURE — 700111 HCHG RX REV CODE 636 W/ 250 OVERRIDE (IP): Performed by: HOSPITALIST

## 2019-05-02 PROCEDURE — 700112 HCHG RX REV CODE 229: Performed by: HOSPITALIST

## 2019-05-02 RX ORDER — RISPERIDONE 0.5 MG/1
0.5 TABLET ORAL EVERY EVENING
Status: DISCONTINUED | OUTPATIENT
Start: 2019-05-02 | End: 2019-05-11 | Stop reason: HOSPADM

## 2019-05-02 RX ADMIN — RISPERIDONE 0.5 MG: 0.5 TABLET ORAL at 05:37

## 2019-05-02 RX ADMIN — HEPARIN SODIUM 5000 UNITS: 5000 INJECTION, SOLUTION INTRAVENOUS; SUBCUTANEOUS at 05:38

## 2019-05-02 RX ADMIN — HEPARIN SODIUM 5000 UNITS: 5000 INJECTION, SOLUTION INTRAVENOUS; SUBCUTANEOUS at 22:35

## 2019-05-02 RX ADMIN — DOCUSATE SODIUM 100 MG: 100 CAPSULE, LIQUID FILLED ORAL at 17:44

## 2019-05-02 RX ADMIN — METOPROLOL TARTRATE 12.5 MG: 25 TABLET ORAL at 05:38

## 2019-05-02 RX ADMIN — HEPARIN SODIUM 5000 UNITS: 5000 INJECTION, SOLUTION INTRAVENOUS; SUBCUTANEOUS at 14:03

## 2019-05-02 RX ADMIN — RISPERIDONE 0.5 MG: 0.5 TABLET ORAL at 17:44

## 2019-05-02 RX ADMIN — METOPROLOL TARTRATE 12.5 MG: 25 TABLET ORAL at 17:43

## 2019-05-02 ASSESSMENT — PAIN SCALES - PAIN ASSESSMENT IN ADVANCED DEMENTIA (PAINAD)
BODYLANGUAGE: RELAXED
TOTALSCORE: 0
FACIALEXPRESSION: SMILING OR INEXPRESSIVE
TOTALSCORE: 0
TOTALSCORE: 0
BREATHING: NORMAL
BODYLANGUAGE: RELAXED
BODYLANGUAGE: RELAXED
BREATHING: NORMAL
FACIALEXPRESSION: SMILING OR INEXPRESSIVE
CONSOLABILITY: NO NEED TO CONSOLE
BREATHING: NORMAL
FACIALEXPRESSION: SMILING OR INEXPRESSIVE
FACIALEXPRESSION: SMILING OR INEXPRESSIVE
BREATHING: NORMAL
CONSOLABILITY: NO NEED TO CONSOLE
BREATHING: NORMAL
TOTALSCORE: 0
FACIALEXPRESSION: SMILING OR INEXPRESSIVE
TOTALSCORE: 0
BODYLANGUAGE: RELAXED
CONSOLABILITY: NO NEED TO CONSOLE
BODYLANGUAGE: RELAXED

## 2019-05-02 NOTE — THERAPY
Attempted therapy follow up session. Pt heavily sleeping in bed, deffering activity as she can barely keep eyes open at this time. Will follow up at later date as appropriate.

## 2019-05-02 NOTE — PROGRESS NOTES
· 2 RN skin check complete with TERRY Reyes.  · Devices in place yes- SCDs.  · Skin assessed under devices yes.  · Confirmed pressure ulcers found on - partial thickness wound to upper back (charted).  · New potential pressure ulcers noted on - right side of the nose. Wound pictures taken and uploaded into EPIC. Wound care consult placed.   · Pt has redness to BL heels and sacrum but blanchable. Scabs to bilateral legs. Skin intact otherwise.   · The following interventions in place - waffle mattress, mepilex to upper back, mepilex lite to nose, q2hr turns, heels floated with pillows, frequent checks for incontinence, Dacia cream.

## 2019-05-02 NOTE — CARE PLAN
Problem: Safety  Goal: Will remain free from injury  Outcome: PROGRESSING AS EXPECTED  Safety precautions in place. Call light within reach. Bed is low and in locked position. Hourly rounding in place.     Problem: Discharge Barriers/Planning  Goal: Patient's continuum of care needs will be met  Outcome: PROGRESSING AS EXPECTED  Pending SNF placement. SW assisting.

## 2019-05-02 NOTE — PROGRESS NOTES
Hospital Medicine Daily Progress Note    Date of Service  5/2/2019    Chief Complaint  89 y.o. female admitted 3/25/2019 with left femoral neck fracture after ground level fall.    Hospital Course    89 y.o. female who presented 3/25/2019 with past medical history of Parkinson's disease, dementia, osteoporosis, paroxysmal atrial fibrillation, admitted with fall/syncope apparently patient was found on the floor by caretaker. EMS found patient in Afib/RVR. Due to concern for possible sepsis patient had pan CT that showed left-sided neck femoral fracture but no other acute findings, patient received broad-spectrum antibiotics. Hip fracture was surgically repaired on 3/26 with Dr. Brian. She remains confused at her baseline. Awaiting long-term placement. 4/27 Long conversation with DPOA regarding nursing efforts to encourage nutrition, re-orient, as well as provided medical update. I asked CM to contact DPOA as well regarding updates to the discharge plan.      Interval Problem Update  5/2: Lethargic. Awakens easily.  Remains confused.  Continues to have poor p.o. intake.  Poorly responsive to 2 visitors today.  Remains a difficult discharges as Medicaid changes are required to allow for long-term care.  Afebrile.  Mildly hypertensive at 142/66.    Consultants/Specialty  Orthopedic surgery  Palliative care    Code Status  DNR/DNI    Disposition  Long term Snf when accepted    Review of Systems  Review of Systems   Unable to perform ROS: Dementia        Physical Exam  Temp:  [36.3 °C (97.3 °F)-36.9 °C (98.5 °F)] 36.9 °C (98.5 °F)  Pulse:  [71-73] 73  Resp:  [16-18] 16  BP: (125-146)/(53-81) 142/66  SpO2:  [90 %-95 %] 94 %    Physical Exam   Constitutional: She appears well-developed. She appears lethargic. She appears cachectic.   Eyes: Pupils are equal, round, and reactive to light. Conjunctivae are normal. No scleral icterus.   Neck: No tracheal deviation present.   Cardiovascular: Normal rate and regular rhythm.     No murmur heard.  Pulmonary/Chest: Effort normal and breath sounds normal. No respiratory distress.   Abdominal: Soft. She exhibits no distension. There is no tenderness.   Musculoskeletal: She exhibits no edema.   Left hip wound cdi   Neurological: She appears lethargic. She is disoriented.   Oriented only to self;   Skin: Skin is warm and dry. She is not diaphoretic. No erythema.   Psychiatric: Cognition and memory are impaired. She expresses impulsivity.   Nursing note and vitals reviewed.      Fluids  No intake or output data in the 24 hours ending 05/02/19 1456                           Assessment/Plan  * Closed fracture of neck of left femur (HCC)- (present on admission)   Assessment & Plan    After a fall, had CT hip showing Acute left femoral neck fracture   Patient underwent left hemiarthroplasty on 3/26/2019.  Continue PT/OT , currently a moderate assist for walker use and ambulation  Staples removed 4/9 and steri strips placed  Awaiting placement       Dysphagia- (present on admission)   Assessment & Plan    Initially failed swallow evaluation.  Per POA, do not place a tube feeding.  Speech therapy, continue pureed diet     Late onset Alzheimer's disease without behavioral disturbance- (present on admission)   Assessment & Plan    Low dose Risperdal  4/26 Reached out to DPOA to discuss goals of care  5/2 attempts to hold a.m. Risperdal.  Restart if patient impulsive and agitated during the day     Moderate protein-calorie malnutrition (HCC)- (present on admission)   Assessment & Plan    Encourage oral intake with puree diet, no tube feeds per POA request     Paroxysmal atrial fibrillation (HCC)- (present on admission)   Assessment & Plan    Patient was found to be on A. fib with RVR, echocardiogram indicate LV EF of 75% and mild MR and TR.  Continue on low-dose metoprolol hold for low blood pressure     Nontraumatic tear of skin   Assessment & Plan    T-spine  RN to turn Q2  Caution with  lifting  Encourage PO     Normocytic anemia- (present on admission)   Assessment & Plan    Check intermittently     Hypokalemia- (present on admission)   Assessment & Plan    Replaced        Hypercholesterolemia- (present on admission)   Assessment & Plan    No evidence for statin therapy at this age         No changes to PE/ROS from previous day except as noted above      VTE prophylaxis: heparin

## 2019-05-02 NOTE — PROGRESS NOTES
· 2 RN skin check complete with TERRY Hendrix.  · Devices in place SCDs.  · Skin assessed under devices , clear of skin breakdown.  · No confirmed pressure ulcers found.  · No skin breakdown noted on pt, sacrum, elbows, knees, and heels are free of skin breakdown. Pt has a surgical scar on riccardo hip from surgery on 3/26/19, wound is healed, staples are out, and no signs of infection.   · The following interventions in place: pt is on a q 2 hour turn schedule, she has a 2 RN skin check done shiftily, pt is on a waffle mattress and heel are floated.

## 2019-05-03 PROBLEM — R63.8 INADEQUATE ORAL INTAKE: Status: ACTIVE | Noted: 2019-05-03

## 2019-05-03 LAB
ANION GAP SERPL CALC-SCNC: 7 MMOL/L (ref 0–11.9)
BUN SERPL-MCNC: 39 MG/DL (ref 8–22)
CALCIUM SERPL-MCNC: 9.4 MG/DL (ref 8.5–10.5)
CHLORIDE SERPL-SCNC: 107 MMOL/L (ref 96–112)
CO2 SERPL-SCNC: 31 MMOL/L (ref 20–33)
CREAT SERPL-MCNC: 0.6 MG/DL (ref 0.5–1.4)
ERYTHROCYTE [DISTWIDTH] IN BLOOD BY AUTOMATED COUNT: 50 FL (ref 35.9–50)
GLUCOSE SERPL-MCNC: 99 MG/DL (ref 65–99)
HCT VFR BLD AUTO: 39.5 % (ref 37–47)
HGB BLD-MCNC: 12.9 G/DL (ref 12–16)
MCH RBC QN AUTO: 31.2 PG (ref 27–33)
MCHC RBC AUTO-ENTMCNC: 32.7 G/DL (ref 33.6–35)
MCV RBC AUTO: 95.6 FL (ref 81.4–97.8)
PLATELET # BLD AUTO: 168 K/UL (ref 164–446)
PMV BLD AUTO: 10.5 FL (ref 9–12.9)
POTASSIUM SERPL-SCNC: 3.8 MMOL/L (ref 3.6–5.5)
RBC # BLD AUTO: 4.13 M/UL (ref 4.2–5.4)
SODIUM SERPL-SCNC: 145 MMOL/L (ref 135–145)
WBC # BLD AUTO: 7.6 K/UL (ref 4.8–10.8)

## 2019-05-03 PROCEDURE — 80048 BASIC METABOLIC PNL TOTAL CA: CPT

## 2019-05-03 PROCEDURE — A9270 NON-COVERED ITEM OR SERVICE: HCPCS | Performed by: NURSE PRACTITIONER

## 2019-05-03 PROCEDURE — A9270 NON-COVERED ITEM OR SERVICE: HCPCS | Performed by: HOSPITALIST

## 2019-05-03 PROCEDURE — 36415 COLL VENOUS BLD VENIPUNCTURE: CPT

## 2019-05-03 PROCEDURE — 700102 HCHG RX REV CODE 250 W/ 637 OVERRIDE(OP): Performed by: HOSPITALIST

## 2019-05-03 PROCEDURE — 700111 HCHG RX REV CODE 636 W/ 250 OVERRIDE (IP): Performed by: HOSPITALIST

## 2019-05-03 PROCEDURE — 700112 HCHG RX REV CODE 229: Performed by: HOSPITALIST

## 2019-05-03 PROCEDURE — 85027 COMPLETE CBC AUTOMATED: CPT

## 2019-05-03 PROCEDURE — 700102 HCHG RX REV CODE 250 W/ 637 OVERRIDE(OP): Performed by: NURSE PRACTITIONER

## 2019-05-03 PROCEDURE — 99231 SBSQ HOSP IP/OBS SF/LOW 25: CPT | Performed by: HOSPITALIST

## 2019-05-03 PROCEDURE — 770006 HCHG ROOM/CARE - MED/SURG/GYN SEMI*

## 2019-05-03 PROCEDURE — 700105 HCHG RX REV CODE 258: Performed by: NURSE PRACTITIONER

## 2019-05-03 RX ORDER — SODIUM CHLORIDE, SODIUM LACTATE, POTASSIUM CHLORIDE, CALCIUM CHLORIDE 600; 310; 30; 20 MG/100ML; MG/100ML; MG/100ML; MG/100ML
INJECTION, SOLUTION INTRAVENOUS CONTINUOUS
Status: DISCONTINUED | OUTPATIENT
Start: 2019-05-03 | End: 2019-05-08

## 2019-05-03 RX ADMIN — METOPROLOL TARTRATE 12.5 MG: 25 TABLET ORAL at 05:20

## 2019-05-03 RX ADMIN — HEPARIN SODIUM 5000 UNITS: 5000 INJECTION, SOLUTION INTRAVENOUS; SUBCUTANEOUS at 05:20

## 2019-05-03 RX ADMIN — HEPARIN SODIUM 5000 UNITS: 5000 INJECTION, SOLUTION INTRAVENOUS; SUBCUTANEOUS at 15:18

## 2019-05-03 RX ADMIN — RISPERIDONE 0.5 MG: 0.5 TABLET ORAL at 17:15

## 2019-05-03 RX ADMIN — SENNOSIDES AND DOCUSATE SODIUM 1 TABLET: 8.6; 5 TABLET ORAL at 05:20

## 2019-05-03 RX ADMIN — HEPARIN SODIUM 5000 UNITS: 5000 INJECTION, SOLUTION INTRAVENOUS; SUBCUTANEOUS at 23:06

## 2019-05-03 RX ADMIN — SODIUM CHLORIDE, POTASSIUM CHLORIDE, SODIUM LACTATE AND CALCIUM CHLORIDE: 600; 310; 30; 20 INJECTION, SOLUTION INTRAVENOUS at 08:40

## 2019-05-03 RX ADMIN — DOCUSATE SODIUM 100 MG: 100 CAPSULE, LIQUID FILLED ORAL at 05:20

## 2019-05-03 ASSESSMENT — PAIN SCALES - PAIN ASSESSMENT IN ADVANCED DEMENTIA (PAINAD)
TOTALSCORE: 0
BREATHING: NORMAL
BODYLANGUAGE: RELAXED
CONSOLABILITY: NO NEED TO CONSOLE
FACIALEXPRESSION: SMILING OR INEXPRESSIVE

## 2019-05-03 NOTE — PROGRESS NOTES
[facility] Medicine Daily Progress Note    Date of Service  5/3/2019    Chief Complaint  89 y.o. female admitted 3/25/2019 with left femoral neck fracture after ground level fall.    Hospital Course    89 y.o. female who presented 3/25/2019 with past medical history of Parkinson's disease, dementia, osteoporosis, paroxysmal atrial fibrillation, admitted with fall/syncope apparently patient was found on the floor by caretaker. EMS found patient in Afib/RVR. Due to concern for possible sepsis patient had pan CT that showed left-sided neck femoral fracture but no other acute findings, patient received broad-spectrum antibiotics. Hip fracture was surgically repaired on 3/26 with Dr. Brian. She remains confused at her baseline. Awaiting long-term placement. 4/27 Long conversation with DPOA regarding nursing efforts to encourage nutrition, re-orient, as well as provided medical update. I asked CM to contact DPOA as well regarding updates to the discharge plan.      Interval Problem Update  5/2: Lethargic. Awakens easily.  Remains confused.  Continues to have poor p.o. intake.  Poorly responsive to 2 visitors today.  Remains a difficult discharges as Medicaid changes are required to allow for long-term care.  Afebrile.  Mildly hypertensive at 142/66.    5/3: BUN/creat ratio high. BP stable and slightly high. Poor PO intake suspected. Periph IV and LR fluids ordered. Remains lethargic but awakens to voice. Would welcome POLST from DPOA re: appropriateness of IV fluids    Consultants/Specialty  Orthopedic surgery  Palliative care    Code Status  DNR/DNI    Disposition  Long term Snf when accepted    Review of Systems  Review of Systems   Unable to perform ROS: Dementia        Physical Exam  Temp:  [36.4 °C (97.5 °F)-36.9 °C (98.5 °F)] 36.6 °C (97.9 °F)  Pulse:  [73-89] 89  Resp:  [14-17] 17  BP: (132-151)/(49-66) 151/61  SpO2:  [93 %-94 %] 93 %    Physical Exam   Constitutional: She appears well-developed. She appears  lethargic. She appears cachectic.   Eyes: Pupils are equal, round, and reactive to light. Conjunctivae are normal. No scleral icterus.   Neck: No tracheal deviation present.   Cardiovascular: Normal rate and regular rhythm.    No murmur heard.  Pulmonary/Chest: Effort normal and breath sounds normal. No respiratory distress.   Abdominal: Soft. She exhibits no distension. There is no tenderness.   Musculoskeletal: She exhibits no edema.   Left hip wound cdi   Neurological: She appears lethargic. She is disoriented.   Oriented only to self;   Skin: Skin is warm and dry. She is not diaphoretic. No erythema.   Psychiatric: Cognition and memory are impaired.   Nursing note and vitals reviewed.      Fluids  No intake or output data in the 24 hours ending 05/03/19 0717  Recent Labs      05/03/19   0419   SODIUM  145   POTASSIUM  3.8   CHLORIDE  107   CO2  31   GLUCOSE  99   BUN  39*   CREATININE  0.60   CALCIUM  9.4                          Assessment/Plan  * Closed fracture of neck of left femur (HCC)- (present on admission)   Assessment & Plan    After a fall, had CT hip showing Acute left femoral neck fracture   Patient underwent left hemiarthroplasty on 3/26/2019.  Continue PT/OT , currently a moderate assist for walker use and ambulation  Staples removed 4/9 and steri strips placed  Awaiting placement       Dysphagia- (present on admission)   Assessment & Plan    Initially failed swallow evaluation.  Per POA, do not place a tube feeding.  Speech therapy, continue pureed diet     Late onset Alzheimer's disease without behavioral disturbance- (present on admission)   Assessment & Plan    Low dose Risperdal  4/26 Reached out to DPOA to discuss goals of care  5/2 attempts to hold a.m. Risperdal.  Restart if patient impulsive and agitated during the day     Inadequate oral intake   Assessment & Plan    5/3 Supplement with IV fluids  POLST recommended re: appropriateness of IV fluids       Moderate protein-calorie  malnutrition (HCC)- (present on admission)   Assessment & Plan    Encourage oral intake with puree diet, no tube feeds per POA request  Poor PO intake       Paroxysmal atrial fibrillation (HCC)- (present on admission)   Assessment & Plan    Patient was found to be on A. fib with RVR, echocardiogram indicate LV EF of 75% and mild MR and TR.  Continue on low-dose metoprolol hold for low blood pressure     Nontraumatic tear of skin   Assessment & Plan    T-spine  RN to turn Q2  Caution with lifting  Encourage PO     Normocytic anemia- (present on admission)   Assessment & Plan    Check intermittently     Hypokalemia- (present on admission)   Assessment & Plan    Replaced        Hypercholesterolemia- (present on admission)   Assessment & Plan    No evidence for statin therapy at this age         No changes to PE/ROS from previous day except as noted above      VTE prophylaxis: heparin

## 2019-05-03 NOTE — WOUND TEAM
Wound consult for evaluation of right side of bridge of nose pressure injury.  Found with mepilex lite foam dressing in place.  Removed and found faint pink fragile skin due to eyeglasses.  No open wound and replaced mepilex lite foam.  Discussed with staff RN that keeping dressing in place when glasses intact (off at present) would be appropriate.  No pressure injury noted.

## 2019-05-03 NOTE — CARE PLAN
Problem: Safety  Goal: Will remain free from falls  Outcome: PROGRESSING AS EXPECTED  Pt is a high fall risk at this time. The pt has a bed alarm in place, socks, 3 bed rails up. Pt is next to the nurse's station and is checked on hourly.     Problem: Venous Thromboembolism (VTW)/Deep Vein Thrombosis (DVT) Prevention:  Goal: Patient will participate in Venous Thrombosis (VTE)/Deep Vein Thrombosis (DVT)Prevention Measures  Outcome: PROGRESSING AS EXPECTED  The pt is participating in her DVT prophylaxis by wearing her SCDs and taking her heparin injections.

## 2019-05-03 NOTE — DISCHARGE PLANNING
Anticipated Discharge Disposition: SNF    Action: Received phone call from Jeannine with [employer] [phone] insurance. Provided update on d/c plan.     Called [facility] and requested they screen pt for full Medicaid. Per rep, pt's medicaid lapsed and they will screen pt this weekend. Per SW supervisor, pt can transfer to [facility], if accepted, on an JENNIFER once Medicaid is pending.     Received VM from Father Jossue requesting call back. LSW called Father Jossue and provided update on d/c status. Father confirmed he's agreeable to completing Medicaid screening for pt. He is requested information on hospice and wanted to know if pt would be eligible. He states he observes pt is declining and wants to get as many resources in place for pt. He's agreeable to having hospice speak with him regarding their services and to see if pt would be hospice appropriate. He requested referral to [facility].   Father Jossue also requested f/u on pt's lenses which he says were lost. Notified charge RN who will look into it.     Barriers to Discharge: Placement    Plan: F/U with Medicaid screening and hospice referral

## 2019-05-03 NOTE — PROGRESS NOTES
· 2 RN skin check complete with TERRY Cardona.  · Devices in place SCDs and heel floater boots.  · Skin assessed under devices , clear of skin breakdown.  · Confirmed pressure injury/ shear injury on pt's back under mepilex that has been documented on previously. Possible DTI to right side of nose, wound consult placed during day shift.  · No skin breakdown noted on pt, sacrum, elbows, knees, and heels are free of skin breakdown. Pt has a surgical scar on riccardo hip from surgery on 3/26/19, wound is healed, staples are out, and no signs of infection.   · The following interventions in place: pt is on a q 2 hour turn schedule, she has a 2 RN skin check done shiftily, pt is on a waffle mattress, and heels are in heel protector boots.

## 2019-05-03 NOTE — PALLIATIVE CARE
Palliative Care follow-up  PC intermittently monitoring as patient is needing placement at this time. Please call [phone] if any palliative needs arise.          Thank you for allowing Palliative Care to participate in this patient's care. Please feel free to call [phone] with any questions or concerns.

## 2019-05-04 PROCEDURE — A9270 NON-COVERED ITEM OR SERVICE: HCPCS | Performed by: NURSE PRACTITIONER

## 2019-05-04 PROCEDURE — 770006 HCHG ROOM/CARE - MED/SURG/GYN SEMI*

## 2019-05-04 PROCEDURE — 700111 HCHG RX REV CODE 636 W/ 250 OVERRIDE (IP): Performed by: HOSPITALIST

## 2019-05-04 PROCEDURE — 99231 SBSQ HOSP IP/OBS SF/LOW 25: CPT | Performed by: HOSPITALIST

## 2019-05-04 PROCEDURE — 700102 HCHG RX REV CODE 250 W/ 637 OVERRIDE(OP): Performed by: NURSE PRACTITIONER

## 2019-05-04 RX ADMIN — HEPARIN SODIUM 5000 UNITS: 5000 INJECTION, SOLUTION INTRAVENOUS; SUBCUTANEOUS at 21:33

## 2019-05-04 RX ADMIN — RISPERIDONE 0.5 MG: 0.5 TABLET ORAL at 17:01

## 2019-05-04 RX ADMIN — HEPARIN SODIUM 5000 UNITS: 5000 INJECTION, SOLUTION INTRAVENOUS; SUBCUTANEOUS at 14:44
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
## 2019-05-04 NOTE — DISCHARGE PLANNING
Received Choice form at 0800  Agency/Facility Name: [facility]   Referral sent per Choice form @ [id number]

## 2019-05-04 NOTE — PROGRESS NOTES
Hospital Medicine Daily Progress Note    Date of Service  5/4/2019    Chief Complaint  89 y.o. female admitted 3/25/2019 with left femoral neck fracture after ground level fall.    Hospital Course    89 y.o. female who presented 3/25/2019 with past medical history of Parkinson's disease, dementia, osteoporosis, paroxysmal atrial fibrillation, admitted with fall/syncope apparently patient was found on the floor by caretaker. EMS found patient in Afib/RVR. Due to concern for possible sepsis patient had pan CT that showed left-sided neck femoral fracture but no other acute findings, patient received broad-spectrum antibiotics. Hip fracture was surgically repaired on 3/26 with Dr. Brian. She remains confused at her baseline. Awaiting long-term placement. 4/27 Long conversation with DPOA regarding nursing efforts to encourage nutrition, re-orient, as well as provided medical update. I asked CM to contact DPOA as well regarding updates to the discharge plan.      Interval Problem Update  Patient's POA interested in hospice, referral placed  Pt resting without any complaints    Consultants/Specialty  Orthopedic surgery  Palliative care    Code Status  DNR/DNI    Disposition  Long term Snf when accepted vs hospice    Review of Systems  Review of Systems   Unable to perform ROS: Dementia        Physical Exam  Temp:  [36.6 °C (97.8 °F)-36.7 °C (98.1 °F)] 36.6 °C (97.8 °F)  Pulse:  [87-93] 87  Resp:  [15-16] 16  BP: (109-147)/(50-72) 147/72  SpO2:  [94 %] 94 %    Physical Exam   Constitutional: She appears well-developed. She appears lethargic. She appears cachectic.   Eyes: Pupils are equal, round, and reactive to light. Conjunctivae are normal. No scleral icterus.   Neck: No tracheal deviation present.   Cardiovascular: Normal rate and regular rhythm.    No murmur heard.  Pulmonary/Chest: Effort normal and breath sounds normal. No respiratory distress.   Abdominal: Soft. She exhibits no distension. There is no  tenderness.   Musculoskeletal: She exhibits no edema.   Left hip wound cdi   Neurological: She appears lethargic. She is disoriented.   Oriented only to self;   Skin: Skin is warm and dry. She is not diaphoretic. No erythema.   Psychiatric: Cognition and memory are impaired.   Nursing note and vitals reviewed.      Fluids    Intake/Output Summary (Last 24 hours) at 05/04/19 1123  Last data filed at 05/03/19 1930   Gross per 24 hour   Intake              500 ml   Output                0 ml   Net              500 ml     Recent Labs      05/03/19   0419   SODIUM  145   POTASSIUM  3.8   CHLORIDE  107   CO2  31   GLUCOSE  99   BUN  39*   CREATININE  0.60   CALCIUM  9.4                          Assessment/Plan  * Closed fracture of neck of left femur (HCC)- (present on admission)   Assessment & Plan    After a fall, had CT hip showing Acute left femoral neck fracture   Patient underwent left hemiarthroplasty on 3/26/2019.  Continue PT/OT , currently a moderate assist for walker use and ambulation  Staples removed 4/9 and steri strips placed  Awaiting placement       Dysphagia- (present on admission)   Assessment & Plan    Initially failed swallow evaluation.  Per POA, do not place a tube feeding.  Speech therapy, continue pureed diet     Late onset Alzheimer's disease without behavioral disturbance- (present on admission)   Assessment & Plan    Low dose Risperdal  4/26 Reached out to DPOA to discuss goals of care  5/2 attempts to hold a.m. Risperdal.  Restart if patient impulsive and agitated during the day     Inadequate oral intake   Assessment & Plan    5/3 Supplement with IV fluids  POLST recommended re: appropriateness of IV fluids       Moderate protein-calorie malnutrition (HCC)- (present on admission)   Assessment & Plan    Encourage oral intake with puree diet, no tube feeds per POA request  Poor PO intake       Paroxysmal atrial fibrillation (HCC)- (present on admission)   Assessment & Plan    Patient was found  to be on A. fib with RVR, echocardiogram indicate LV EF of 75% and mild MR and TR.  Continue on low-dose metoprolol hold for low blood pressure     Nontraumatic tear of skin   Assessment & Plan    T-spine  RN to turn Q2  Caution with lifting  Encourage PO     Normocytic anemia- (present on admission)   Assessment & Plan    Check intermittently     Hypokalemia- (present on admission)   Assessment & Plan    Replaced        Hypercholesterolemia- (present on admission)   Assessment & Plan    No evidence for statin therapy at this age         VTE prophylaxis: heparin

## 2019-05-04 NOTE — CARE PLAN
Problem: Safety  Goal: Will remain free from falls  Outcome: PROGRESSING AS EXPECTED  Pt is a high fall risk at this time. Pt has a bed alarm in place, has slippers, and bed is low. The pt's room is close to the nurse's station as well.     Problem: Knowledge Deficit  Goal: Knowledge of disease process/condition, treatment plan, diagnostic tests, and medications will improve  Outcome: PROGRESSING SLOWER THAN EXPECTED  The pt is very disoriented and is only oriented to herself at this time. Pt education is difficult as the pt does not retain any of the information and at times argues with nursing staff about her care.

## 2019-05-05 PROCEDURE — 700102 HCHG RX REV CODE 250 W/ 637 OVERRIDE(OP): Performed by: NURSE PRACTITIONER

## 2019-05-05 PROCEDURE — A9270 NON-COVERED ITEM OR SERVICE: HCPCS | Performed by: HOSPITALIST

## 2019-05-05 PROCEDURE — 700112 HCHG RX REV CODE 229: Performed by: HOSPITALIST

## 2019-05-05 PROCEDURE — 700102 HCHG RX REV CODE 250 W/ 637 OVERRIDE(OP): Performed by: HOSPITALIST

## 2019-05-05 PROCEDURE — 99231 SBSQ HOSP IP/OBS SF/LOW 25: CPT | Performed by: HOSPITALIST

## 2019-05-05 PROCEDURE — 770006 HCHG ROOM/CARE - MED/SURG/GYN SEMI*

## 2019-05-05 PROCEDURE — A9270 NON-COVERED ITEM OR SERVICE: HCPCS | Performed by: NURSE PRACTITIONER

## 2019-05-05 PROCEDURE — 700111 HCHG RX REV CODE 636 W/ 250 OVERRIDE (IP): Performed by: HOSPITALIST

## 2019-05-05 RX ADMIN — HEPARIN SODIUM 5000 UNITS: 5000 INJECTION, SOLUTION INTRAVENOUS; SUBCUTANEOUS at 15:53

## 2019-05-05 RX ADMIN — METOPROLOL TARTRATE 12.5 MG: 25 TABLET ORAL at 16:39

## 2019-05-05 RX ADMIN — HEPARIN SODIUM 5000 UNITS: 5000 INJECTION, SOLUTION INTRAVENOUS; SUBCUTANEOUS at 21:32

## 2019-05-05 RX ADMIN — METOPROLOL TARTRATE 12.5 MG: 25 TABLET ORAL at 05:23

## 2019-05-05 RX ADMIN — HEPARIN SODIUM 5000 UNITS: 5000 INJECTION, SOLUTION INTRAVENOUS; SUBCUTANEOUS at 05:23

## 2019-05-05 RX ADMIN — DOCUSATE SODIUM 100 MG: 100 CAPSULE, LIQUID FILLED ORAL at 05:27

## 2019-05-05 RX ADMIN — RISPERIDONE 0.5 MG: 0.5 TABLET ORAL at 16:39

## 2019-05-05 RX ADMIN — DOCUSATE SODIUM 100 MG: 100 CAPSULE, LIQUID FILLED ORAL at 16:39

## 2019-05-05 NOTE — CARE PLAN
Problem: Safety  Goal: Will remain free from falls  Outcome: PROGRESSING AS EXPECTED  Patient in room close to nursing station. Bed is locked and in lowest position. Bed alarm in use. Call light within reach. Hourly rounding in place.     Problem: Infection  Goal: Will remain free from infection  Outcome: PROGRESSING AS EXPECTED  Hand hygiene performed before and after patient contact. Standard precautions in place.

## 2019-05-05 NOTE — PROGRESS NOTES
· 2 RN skin check complete with TERRY Zuniga.  · Devices in place: SCDs.   · Skin assessed under devices: Yes. No signs of skin breakdown noted under SCDs.  · Confirmed pressure/shear injury on patients back (this has been previously documented). Mepilex dressing changed and in place over this pressure/shear injury.   · Slight bruising and scabbing noted in bilateral lower extremities. Bilateral heels are slightly red and blanching. Slight redness and bruising noted on sacrum, skin is blanching. Surgical scar on left hip from previous surgery noted, incision site is healed with no signs of infection.    · The following interventions in place: every 2 hour turns, 2 RN skin check each shift, waffle cushion, frequent checks for incontinence, barrier cream and barrier wipes in use, and pillows in use for support and positioning.

## 2019-05-05 NOTE — CARE PLAN
Problem: Safety  Goal: Will remain free from injury  Safety precautions in place. Call light within reach. Bed is low and in locked position. Hourly rounding in place.

## 2019-05-05 NOTE — PROGRESS NOTES
Hospital Medicine Daily Progress Note    Date of Service  5/5/2019    Chief Complaint  89 y.o. female admitted 3/25/2019 with left femoral neck fracture after ground level fall.    Hospital Course    89 y.o. female who presented 3/25/2019 with past medical history of Parkinson's disease, dementia, osteoporosis, paroxysmal atrial fibrillation, admitted with fall/syncope apparently patient was found on the floor by caretaker. EMS found patient in Afib/RVR. Due to concern for possible sepsis patient had pan CT that showed left-sided neck femoral fracture but no other acute findings, patient received broad-spectrum antibiotics. Hip fracture was surgically repaired on 3/26 with Dr. Brian. She remains confused at her baseline. Awaiting long-term placement. 4/27 Long conversation with DPOA regarding nursing efforts to encourage nutrition, re-orient, as well as provided medical update. I asked CM to contact DPOA as well regarding updates to the discharge plan.      Interval Problem Update  No acute events overnight  Pt comfortable  Pending hospice eval    Consultants/Specialty  Orthopedic surgery  Palliative care    Code Status  DNR/DNI    Disposition  Long term Snf when accepted vs hospice    Review of Systems  Review of Systems   Unable to perform ROS: Dementia        Physical Exam  Temp:  [36.6 °C (97.8 °F)-37.1 °C (98.7 °F)] 37.1 °C (98.7 °F)  Pulse:  [83-87] 87  Resp:  [16] 16  BP: (100-147)/(43-72) 114/62  SpO2:  [91 %-94 %] 92 %    Physical Exam   Constitutional: She appears well-developed. She appears lethargic. She appears cachectic.   Eyes: Pupils are equal, round, and reactive to light. Conjunctivae are normal. No scleral icterus.   Neck: No tracheal deviation present.   Cardiovascular: Normal rate and regular rhythm.    No murmur heard.  Pulmonary/Chest: Effort normal and breath sounds normal. No respiratory distress.   Abdominal: Soft. She exhibits no distension. There is no tenderness.   Musculoskeletal:  She exhibits no edema.   Left hip wound cdi   Neurological: She appears lethargic. She is disoriented.   Oriented only to self;   Skin: Skin is warm and dry. She is not diaphoretic. No erythema.   Psychiatric: Cognition and memory are impaired.   Nursing note and vitals reviewed.      Fluids    Intake/Output Summary (Last 24 hours) at 05/05/19 0819  Last data filed at 05/04/19 2133   Gross per 24 hour   Intake              540 ml   Output                0 ml   Net              540 ml     Recent Labs      05/03/19   0419   SODIUM  145   POTASSIUM  3.8   CHLORIDE  107   CO2  31   GLUCOSE  99   BUN  39*   CREATININE  0.60   CALCIUM  9.4                          Assessment/Plan  * Closed fracture of neck of left femur (HCC)- (present on admission)   Assessment & Plan    After a fall, had CT hip showing Acute left femoral neck fracture   Patient underwent left hemiarthroplasty on 3/26/2019.  Continue PT/OT , currently a moderate assist for walker use and ambulation  Staples removed 4/9 and steri strips placed  Awaiting placement       Dysphagia- (present on admission)   Assessment & Plan    Initially failed swallow evaluation.  Per POA, do not place a tube feeding.  Speech therapy, continue pureed diet     Late onset Alzheimer's disease without behavioral disturbance- (present on admission)   Assessment & Plan    Low dose Risperdal  4/26 Reached out to DPOA to discuss goals of care  5/2 attempts to hold a.m. Risperdal.  Restart if patient impulsive and agitated during the day     Inadequate oral intake   Assessment & Plan    5/3 Supplement with IV fluids  POLST recommended re: appropriateness of IV fluids       Moderate protein-calorie malnutrition (HCC)- (present on admission)   Assessment & Plan    Encourage oral intake with puree diet, no tube feeds per POA request  Poor PO intake       Paroxysmal atrial fibrillation (HCC)- (present on admission)   Assessment & Plan    Patient was found to be on A. fib with RVR,  echocardiogram indicate LV EF of 75% and mild MR and TR.  Continue on low-dose metoprolol hold for low blood pressure     Nontraumatic tear of skin   Assessment & Plan    T-spine  RN to turn Q2  Caution with lifting  Encourage PO     Normocytic anemia- (present on admission)   Assessment & Plan    Check intermittently     Hypokalemia- (present on admission)   Assessment & Plan    Replaced        Hypercholesterolemia- (present on admission)   Assessment & Plan    No evidence for statin therapy at this age         VTE prophylaxis: heparin

## 2019-05-06 PROCEDURE — A9270 NON-COVERED ITEM OR SERVICE: HCPCS | Performed by: HOSPITALIST

## 2019-05-06 PROCEDURE — 99231 SBSQ HOSP IP/OBS SF/LOW 25: CPT | Performed by: HOSPITALIST

## 2019-05-06 PROCEDURE — 700102 HCHG RX REV CODE 250 W/ 637 OVERRIDE(OP): Performed by: HOSPITALIST

## 2019-05-06 PROCEDURE — 770006 HCHG ROOM/CARE - MED/SURG/GYN SEMI*

## 2019-05-06 PROCEDURE — 97116 GAIT TRAINING THERAPY: CPT

## 2019-05-06 PROCEDURE — 700112 HCHG RX REV CODE 229: Performed by: HOSPITALIST

## 2019-05-06 PROCEDURE — 97530 THERAPEUTIC ACTIVITIES: CPT

## 2019-05-06 PROCEDURE — A9270 NON-COVERED ITEM OR SERVICE: HCPCS | Performed by: NURSE PRACTITIONER

## 2019-05-06 PROCEDURE — 700111 HCHG RX REV CODE 636 W/ 250 OVERRIDE (IP): Performed by: HOSPITALIST

## 2019-05-06 PROCEDURE — 700102 HCHG RX REV CODE 250 W/ 637 OVERRIDE(OP): Performed by: NURSE PRACTITIONER

## 2019-05-06 RX ADMIN — SENNOSIDES AND DOCUSATE SODIUM 1 TABLET: 8.6; 5 TABLET ORAL at 05:20

## 2019-05-06 RX ADMIN — RISPERIDONE 0.5 MG: 0.5 TABLET ORAL at 17:19

## 2019-05-06 RX ADMIN — HEPARIN SODIUM 5000 UNITS: 5000 INJECTION, SOLUTION INTRAVENOUS; SUBCUTANEOUS at 14:45

## 2019-05-06 RX ADMIN — METOPROLOL TARTRATE 12.5 MG: 25 TABLET ORAL at 05:18

## 2019-05-06 RX ADMIN — QUETIAPINE FUMARATE 50 MG: 25 TABLET ORAL at 23:50

## 2019-05-06 RX ADMIN — METOPROLOL TARTRATE 12.5 MG: 25 TABLET ORAL at 17:20

## 2019-05-06 RX ADMIN — HEPARIN SODIUM 5000 UNITS: 5000 INJECTION, SOLUTION INTRAVENOUS; SUBCUTANEOUS at 05:18

## 2019-05-06 RX ADMIN — DOCUSATE SODIUM 100 MG: 100 CAPSULE, LIQUID FILLED ORAL at 17:19

## 2019-05-06 RX ADMIN — DOCUSATE SODIUM 100 MG: 100 CAPSULE, LIQUID FILLED ORAL at 05:19

## 2019-05-06 RX ADMIN — HEPARIN SODIUM 5000 UNITS: 5000 INJECTION, SOLUTION INTRAVENOUS; SUBCUTANEOUS at 22:58

## 2019-05-06 ASSESSMENT — GAIT ASSESSMENTS
ASSISTIVE DEVICE: FRONT WHEEL WALKER
GAIT LEVEL OF ASSIST: MINIMAL ASSIST
DEVIATION: BRADYKINETIC;SHUFFLED GAIT
DISTANCE (FEET): 15

## 2019-05-06 ASSESSMENT — COGNITIVE AND FUNCTIONAL STATUS - GENERAL
DAILY ACTIVITIY SCORE: 15
MOVING FROM LYING ON BACK TO SITTING ON SIDE OF FLAT BED: UNABLE
MOBILITY SCORE: 10
CLIMB 3 TO 5 STEPS WITH RAILING: TOTAL
DRESSING REGULAR UPPER BODY CLOTHING: A LOT
MOVING TO AND FROM BED TO CHAIR: UNABLE
SUGGESTED CMS G CODE MODIFIER DAILY ACTIVITY: CK
PERSONAL GROOMING: A LITTLE
SUGGESTED CMS G CODE MODIFIER MOBILITY: CL
STANDING UP FROM CHAIR USING ARMS: A LITTLE
WALKING IN HOSPITAL ROOM: A LITTLE
TURNING FROM BACK TO SIDE WHILE IN FLAT BAD: UNABLE
DRESSING REGULAR LOWER BODY CLOTHING: A LOT
HELP NEEDED FOR BATHING: A LOT
TOILETING: A LOT

## 2019-05-06 NOTE — DISCHARGE PLANNING
Anticipated Discharge Disposition: SNF with Hospice    Action: LSW received a phone call from Father Jossue who wanted an update on pt's discharge plan. LSW provided update. Father Jossue stated that he would like pt to go to a group home with Hospice ([facility] [phone]) or to SNF with Hospice.     LSW spoke with [facility] who stated that they have no notes stating that the insurance was initiated to switch from QMB to Medicaid FFS. LSW request that they start this process.     Barriers to Discharge: Discharge Placement     Plan: Awaiting initiation of QMB to Medicaid FFS.

## 2019-05-06 NOTE — PROGRESS NOTES
· 2 RN skin check complete with TERRY Melo.  · Devices in place: SCDs.   · Skin assessed under devices: Yes. No signs of skin breakdown noted under SCDs.  · Confirmed pressure/shear injury found on patients back (this has been previously documented). Mepilex dressing in place over this pressure/shear injury.   · Redness noted on bilateral elbows, elbows slowly blanching. Mepilex dressing applied over bilateral elbows. Slight bruising and scabbing noted in bilateral lower extremities. Bilateral heels are slightly red and blanching. Slight redness and bruising noted on sacrum, skin is blanching. Surgical scar on left hip from previous surgery noted, incision site is healed with no signs of infection.   · The following interventions in place: every 2 hour turns, 2 RN skin check each shift, waffle cushion, frequent checks for incontinence, barrier cream and barrier wipes in use, Mepilex dressings over bony prominences, and pillows in use for support and positioning.

## 2019-05-06 NOTE — CARE PLAN
Problem: Safety  Goal: Will remain free from falls  Outcome: PROGRESSING AS EXPECTED  Bed and chair alarm in use. Bed brakes applied, bed in lowest position. Call light within reach. Hourly rounding in place.     Problem: Skin Integrity  Goal: Risk for impaired skin integrity will decrease  Turns every 2 hours and frequent checks for incontinence in place. Waffle cushion, barrier cream and barrier wipes in use. Mepilex dressing in use over patient back.

## 2019-05-06 NOTE — THERAPY
"Physical Therapy Treatment completed.   Bed Mobility:  Supine to Sit: Moderate Assist  Transfers: Sit to Stand: Minimal Assist  Gait: Level Of Assist: Minimal Assist with Front-Wheel Walker       Plan of Care: Will benefit from Physical Therapy 3 times per week  Discharge Recommendations: Equipment: Will Continue to Assess for Equipment Needs. Post-acute therapy Recommend inpatient transitional care services for continued physical therapy services.      Pt much more alert today but very confused. Able to follow simple one step commands to perform basic mobility, continues to require physical assist with all tasks. Very short shuffling steps but improved ability to hold onto FWW and maintian upright posture. Pt forgetting she had surgery and continuing to ask therapist \"why are you letting me live in your house?\" Unable to comprehend she is in the hospital. Continue to recommend post acute placement for additional rehabiliation. Acute PT to continue to follow whilein house.    See \"Rehab Therapy-Acute\" Patient Summary Report for complete documentation.       "

## 2019-05-06 NOTE — PROGRESS NOTES
Hospital Medicine Daily Progress Note    Date of Service  5/6/2019    Chief Complaint  89 y.o. female admitted 3/25/2019 with left femoral neck fracture after ground level fall.    Hospital Course    89 y.o. female who presented 3/25/2019 with past medical history of Parkinson's disease, dementia, osteoporosis, paroxysmal atrial fibrillation, admitted with fall/syncope apparently patient was found on the floor by caretaker. EMS found patient in Afib/RVR. Due to concern for possible sepsis patient had pan CT that showed left-sided neck femoral fracture but no other acute findings, patient received broad-spectrum antibiotics. Hip fracture was surgically repaired on 3/26 with Dr. Brian. She remains confused at her baseline. Awaiting long-term placement. 4/27 Long conversation with DPOA regarding nursing efforts to encourage nutrition, re-orient, as well as provided medical update. I asked CM to contact DPOA as well regarding updates to the discharge plan.      Interval Problem Update  No acute events overnight  She is awake and laying in bed    Consultants/Specialty  Orthopedic surgery  Palliative care    Code Status  DNR/DNI    Disposition  Long term Snf with hospice  Pending medicaid and JENNIFER    Review of Systems  Review of Systems   Unable to perform ROS: Dementia        Physical Exam  Temp:  [36.8 °C (98.2 °F)-37.1 °C (98.8 °F)] 37.1 °C (98.8 °F)  Pulse:  [64-75] 67  Resp:  [16-17] 16  BP: (110-140)/(44-70) 139/70  SpO2:  [92 %-96 %] 96 %    Physical Exam   Constitutional: She appears well-developed. She appears lethargic. She appears cachectic.   Eyes: Pupils are equal, round, and reactive to light. Conjunctivae are normal. No scleral icterus.   Neck: No tracheal deviation present.   Cardiovascular: Normal rate and regular rhythm.    No murmur heard.  Pulmonary/Chest: Effort normal and breath sounds normal. No respiratory distress.   Abdominal: Soft. She exhibits no distension. There is no tenderness.    Musculoskeletal: She exhibits no edema.   Left hip wound cdi   Neurological: She appears lethargic. She is disoriented.   Oriented only to self;   Skin: Skin is warm and dry. She is not diaphoretic. No erythema.   Psychiatric: Cognition and memory are impaired.   Nursing note and vitals reviewed.      Fluids    Intake/Output Summary (Last 24 hours) at 05/06/19 1021  Last data filed at 05/05/19 1928   Gross per 24 hour   Intake              600 ml   Output                0 ml   Net              600 ml                              Assessment/Plan  * Closed fracture of neck of left femur (HCC)- (present on admission)   Assessment & Plan    After a fall, had CT hip showing Acute left femoral neck fracture   Patient underwent left hemiarthroplasty on 3/26/2019.  Continue PT/OT , currently a moderate assist for walker use and ambulation  Staples removed 4/9 and steri strips placed  Awaiting placement       Dysphagia- (present on admission)   Assessment & Plan    Initially failed swallow evaluation.  Per POA, do not place a tube feeding.  Speech therapy, continue pureed diet     Late onset Alzheimer's disease without behavioral disturbance- (present on admission)   Assessment & Plan    Low dose Risperdal  4/26 Reached out to DPOA to discuss goals of care  5/2 attempts to hold a.m. Risperdal.  Restart if patient impulsive and agitated during the day     Inadequate oral intake   Assessment & Plan    5/3 Supplement with IV fluids  POLST recommended re: appropriateness of IV fluids       Moderate protein-calorie malnutrition (HCC)- (present on admission)   Assessment & Plan    Encourage oral intake with puree diet, no tube feeds per POA request  Poor PO intake       Paroxysmal atrial fibrillation (HCC)- (present on admission)   Assessment & Plan    Patient was found to be on A. fib with RVR, echocardiogram indicate LV EF of 75% and mild MR and TR.  Continue on low-dose metoprolol hold for low blood pressure     Nontraumatic  tear of skin   Assessment & Plan    T-spine  RN to turn Q2  Caution with lifting  Encourage PO     Normocytic anemia- (present on admission)   Assessment & Plan    Check intermittently     Hypokalemia- (present on admission)   Assessment & Plan    Replaced        Hypercholesterolemia- (present on admission)   Assessment & Plan    No evidence for statin therapy at this age         VTE prophylaxis: heparin

## 2019-05-06 NOTE — PROGRESS NOTES
· 2 RN skin check complete with TERRY Aly  · Devices in place: SCDs.   · Skin assessed under devices: Yes. No signs of skin breakdown noted under SCDs.  · Confirmed pressure/shear injury on patients back (this has been previously documented). Mepilex dressing  To back  · Slight bruising and scabbing noted in bilateral lower extremities. Bilateral heels are slightly red and blanching. Slight redness and bruising noted on sacrum, skin is blanching. Bruising to abdomen  · The following interventions in place: every 2 hour turns, 2 RN skin check each shift, waffle cushion, frequent checks for incontinence, barrier cream and barrier wipes in use, and pillows in use for support and positioning.

## 2019-05-06 NOTE — THERAPY
"Occupational Therapy Treatment completed with focus on ADLs and ADL transfers.  Functional Status:  Mod/max A with AFDLs and txfs  Plan of Care: Will benefit from Occupational Therapy 2 times per week  Discharge Recommendations:  Equipment Will Continue to Assess for Equipment Needs. Post-acute therapy Discharge to a transitional care facility for continued therapy services.    See \"Rehab Therapy-Acute\" Patient Summary Report for complete documentation.   "

## 2019-05-07 PROCEDURE — A9270 NON-COVERED ITEM OR SERVICE: HCPCS | Performed by: NURSE PRACTITIONER

## 2019-05-07 PROCEDURE — 700102 HCHG RX REV CODE 250 W/ 637 OVERRIDE(OP): Performed by: HOSPITALIST

## 2019-05-07 PROCEDURE — A9270 NON-COVERED ITEM OR SERVICE: HCPCS | Performed by: HOSPITALIST

## 2019-05-07 PROCEDURE — 700111 HCHG RX REV CODE 636 W/ 250 OVERRIDE (IP): Performed by: HOSPITALIST

## 2019-05-07 PROCEDURE — 700102 HCHG RX REV CODE 250 W/ 637 OVERRIDE(OP): Performed by: NURSE PRACTITIONER

## 2019-05-07 PROCEDURE — 700112 HCHG RX REV CODE 229: Performed by: HOSPITALIST

## 2019-05-07 PROCEDURE — 99232 SBSQ HOSP IP/OBS MODERATE 35: CPT | Performed by: INTERNAL MEDICINE

## 2019-05-07 PROCEDURE — 770006 HCHG ROOM/CARE - MED/SURG/GYN SEMI*

## 2019-05-07 RX ADMIN — METOPROLOL TARTRATE 12.5 MG: 25 TABLET ORAL at 17:35

## 2019-05-07 RX ADMIN — HEPARIN SODIUM 5000 UNITS: 5000 INJECTION, SOLUTION INTRAVENOUS; SUBCUTANEOUS at 05:41

## 2019-05-07 RX ADMIN — HEPARIN SODIUM 5000 UNITS: 5000 INJECTION, SOLUTION INTRAVENOUS; SUBCUTANEOUS at 21:04

## 2019-05-07 RX ADMIN — HEPARIN SODIUM 5000 UNITS: 5000 INJECTION, SOLUTION INTRAVENOUS; SUBCUTANEOUS at 14:45

## 2019-05-07 RX ADMIN — DOCUSATE SODIUM 100 MG: 100 CAPSULE, LIQUID FILLED ORAL at 17:35

## 2019-05-07 RX ADMIN — RISPERIDONE 0.5 MG: 0.5 TABLET ORAL at 17:35

## 2019-05-07 NOTE — PROGRESS NOTES
· 2 RN skin check complete with TERRY Glynn.  · Devices in place SCDs and glasses.  · Skin assessed under devices.  · Bruising on bilateral upper extremities and abdomen. Scabbing on bilateral lower extremities. Blanchable redness to heels, wound on back, redness on bridge of nose from glasses.  · The following interventions in place: Mepilex on back wound, mepilex on the bridge of nose, pillows in place for support and positioning, Q2H turns

## 2019-05-07 NOTE — CARE PLAN
Problem: Safety  Goal: Will remain free from falls  Outcome: MET Date Met: 05/07/19  Bed locked in lowest position with top two siderails up. Call light within reach. Treaded socks on. Educated patient to call for help before getting up    Problem: Respiratory:  Goal: Respiratory status will improve  Outcome: MET Date Met: 05/07/19  Patient on room air

## 2019-05-07 NOTE — CARE PLAN
Problem: Safety  Goal: Will remain free from injury  Outcome: PROGRESSING AS EXPECTED  Falls precautions in place.  Pt educated on the use of the call light and demonstrates use appropriately.    Problem: Infection  Goal: Will remain free from infection  Outcome: PROGRESSING AS EXPECTED  Pt afebrile.  Standard precautions in place.  Monitoring labs and vitals for s/s of infection.  Pt educated on the importance of hand washing especially while in hospital, pt verbalized understanding.

## 2019-05-07 NOTE — DISCHARGE PLANNING
Anticipated Discharge Disposition: SNF with Hospice    Action: LSW spoke with Father Jossue ([phone]) and updated him on discharge plan. LSW encouraged him to call Melanie with [facility] and that he should be expecting a call from PFA shortly. No further questions at this time.     Barriers to Discharge: Insurance switch    Plan: Awaiting initiation of QMB to Medicaid FFS. LSW to follow-up with PFA tomorrow (5/8/19) re: initiation of process.

## 2019-05-07 NOTE — DISCHARGE PLANNING
Anticipated Discharge Disposition: SNF with Hospice    Action: LSW received a phone call from Melanie with [facility]. Melanie stated that pt has been accepted and they will be able to follow pt when she discharges from the hospital.     LSW spoke with PFA to inquire if the process to transfer pt's insurance from QMB to Medicaid FFS. PFA stated that their supervisor, Jessica, is working on this process and they (PFA) will need to contact DPOA to initiate this.     Barriers to Discharge: Insurance switch    Plan: Awaiting initiation of QMB to Medicaid FFS. LSW to follow-up with PFA tomorrow (5/8/19) re: initiation of process.

## 2019-05-07 NOTE — PROGRESS NOTES
Hospital Medicine Daily Progress Note    Date of Service  5/7/2019    Chief Complaint  89 y.o. female admitted 3/25/2019 with left femoral neck fracture after ground level fall.    Hospital Course    89 y.o. female who presented 3/25/2019 with past medical history of Parkinson's disease, dementia, osteoporosis, paroxysmal atrial fibrillation, admitted with fall/syncope apparently patient was found on the floor by caretaker. EMS found patient in Afib/RVR. Due to concern for possible sepsis patient had pan CT that showed left-sided neck femoral fracture but no other acute findings, patient received broad-spectrum antibiotics. Hip fracture was surgically repaired on 3/26 with Dr. Brian. She remains confused at her baseline. Awaiting long-term placement. 4/27 Long conversation with DPOA regarding nursing efforts to encourage nutrition, re-orient, as well as provided medical update. I asked CM to contact DPOA as well regarding updates to the discharge plan.      Interval Problem Update  Femoral neck fracture-pain is well controlled currently.  Patient is somewhat lethargic this morning but her friend at bedside has no issues.  Still working on hospice but.    No behavioral issues this morning.    Consultants/Specialty  Orthopedic surgery  Palliative care    Code Status  DNR/DNI    Disposition  Long term Snf with hospice  Pending medicaid and JENNIFER    Review of Systems  Review of Systems   Unable to perform ROS: Dementia        Physical Exam  Temp:  [36.3 °C (97.4 °F)-37 °C (98.6 °F)] 36.8 °C (98.2 °F)  Pulse:  [69-96] 83  Resp:  [18] 18  BP: (101-160)/(54-72) 101/68  SpO2:  [91 %-94 %] 92 %    Physical Exam   Constitutional: She appears well-developed. She appears lethargic. She appears cachectic.   Quite lethargic this morning   Eyes: Pupils are equal, round, and reactive to light. Conjunctivae are normal. Right eye exhibits no discharge. Left eye exhibits no discharge.   Cardiovascular: Normal rate and regular  rhythm.    Pulmonary/Chest: Effort normal and breath sounds normal. No stridor. She has no wheezes.   Abdominal: Soft. There is no tenderness.   Musculoskeletal: She exhibits no edema.   Left hip wound cdi-unchanged today   Neurological: She appears lethargic. She is disoriented.   Oriented only to self; very lethargic this morning   Skin: Skin is warm and dry. She is not diaphoretic. No erythema.   Psychiatric: Cognition and memory are impaired.   Nursing note and vitals reviewed.      Fluids    Intake/Output Summary (Last 24 hours) at 05/07/19 1359  Last data filed at 05/06/19 2350   Gross per 24 hour   Intake              250 ml   Output                0 ml   Net              250 ml                              Assessment/Plan  * Closed fracture of neck of left femur (HCC)- (present on admission)   Assessment & Plan    After a fall, had CT hip showing Acute left femoral neck fracture   Patient underwent left hemiarthroplasty on 3/26/2019.  Continue PT/OT , currently a moderate assist for walker use and ambulation  Staples removed 4/9 and steri strips placed  Awaiting placement  -Pain appears well-controlled and continue multimodal pain therapy       Dysphagia- (present on admission)   Assessment & Plan    Initially failed swallow evaluation.  Per POA, do not place a tube feeding.  Speech therapy, continue pureed diet     Late onset Alzheimer's disease without behavioral disturbance- (present on admission)   Assessment & Plan    -Behavior seems well-controlled  4/26 Reached out to DPOA to discuss goals of care  -Continue Risperdal 0.5 mg every evening and Seroquel as needed, no current behavioral issues     Inadequate oral intake   Assessment & Plan    5/3 Supplement with IV fluids  POLST recommended re: appropriateness of IV fluids       Moderate protein-calorie malnutrition (HCC)- (present on admission)   Assessment & Plan    Encourage oral intake with puree diet, no tube feeds per POA request  Poor PO intake-no  obvious change at this time and unlikely to improve       Paroxysmal atrial fibrillation (HCC)- (present on admission)   Assessment & Plan    Patient was found to be on A. fib with RVR, echocardiogram indicate LV EF of 75% and mild MR and TR.  Continue on low-dose metoprolol hold for low blood pressure, no further adjustments needed at this time     Nontraumatic tear of skin- (present on admission)   Assessment & Plan    T-spine  RN to turn Q2  Caution with lifting  Encourage PO     Normocytic anemia- (present on admission)   Assessment & Plan    Check intermittently     Hypokalemia- (present on admission)   Assessment & Plan    Replaced        Hypercholesterolemia- (present on admission)   Assessment & Plan    No evidence for statin therapy at this age         VTE prophylaxis: heparin

## 2019-05-07 NOTE — DISCHARGE PLANNING
Anticipated Discharge Disposition: SNF with Hospice    Action: LSW received a phone call from Josy ([phone]) with [facility] who stated that they will be following pt to SNF and can follow up with Palliative care while she is there.     LSW left a message for supervisor to discuss next steps.     Barriers to Discharge: Discharge placement.     Plan: Awaiting PFA to start process for Medicaid FFS. Discuss agreement with Demetria. Update Father Jossue.

## 2019-05-08 PROCEDURE — A9270 NON-COVERED ITEM OR SERVICE: HCPCS | Performed by: HOSPITALIST

## 2019-05-08 PROCEDURE — 700112 HCHG RX REV CODE 229: Performed by: HOSPITALIST

## 2019-05-08 PROCEDURE — 770006 HCHG ROOM/CARE - MED/SURG/GYN SEMI*

## 2019-05-08 PROCEDURE — 700102 HCHG RX REV CODE 250 W/ 637 OVERRIDE(OP): Performed by: HOSPITALIST

## 2019-05-08 PROCEDURE — 99232 SBSQ HOSP IP/OBS MODERATE 35: CPT | Performed by: INTERNAL MEDICINE

## 2019-05-08 PROCEDURE — 700102 HCHG RX REV CODE 250 W/ 637 OVERRIDE(OP): Performed by: NURSE PRACTITIONER

## 2019-05-08 PROCEDURE — 97530 THERAPEUTIC ACTIVITIES: CPT

## 2019-05-08 PROCEDURE — 700111 HCHG RX REV CODE 636 W/ 250 OVERRIDE (IP): Performed by: HOSPITALIST

## 2019-05-08 PROCEDURE — A9270 NON-COVERED ITEM OR SERVICE: HCPCS | Performed by: NURSE PRACTITIONER

## 2019-05-08 PROCEDURE — 92526 ORAL FUNCTION THERAPY: CPT

## 2019-05-08 RX ADMIN — HEPARIN SODIUM 5000 UNITS: 5000 INJECTION, SOLUTION INTRAVENOUS; SUBCUTANEOUS at 22:00

## 2019-05-08 RX ADMIN — DOCUSATE SODIUM 100 MG: 100 CAPSULE, LIQUID FILLED ORAL at 17:36

## 2019-05-08 RX ADMIN — METOPROLOL TARTRATE 12.5 MG: 25 TABLET ORAL at 17:36

## 2019-05-08 RX ADMIN — HEPARIN SODIUM 5000 UNITS: 5000 INJECTION, SOLUTION INTRAVENOUS; SUBCUTANEOUS at 05:23

## 2019-05-08 RX ADMIN — HEPARIN SODIUM 5000 UNITS: 5000 INJECTION, SOLUTION INTRAVENOUS; SUBCUTANEOUS at 14:15

## 2019-05-08 RX ADMIN — RISPERIDONE 0.5 MG: 0.5 TABLET ORAL at 17:37

## 2019-05-08 ASSESSMENT — GAIT ASSESSMENTS: GAIT LEVEL OF ASSIST: REFUSED

## 2019-05-08 ASSESSMENT — COGNITIVE AND FUNCTIONAL STATUS - GENERAL
CLIMB 3 TO 5 STEPS WITH RAILING: TOTAL
SUGGESTED CMS G CODE MODIFIER MOBILITY: CL
WALKING IN HOSPITAL ROOM: A LITTLE
STANDING UP FROM CHAIR USING ARMS: A LITTLE
MOVING FROM LYING ON BACK TO SITTING ON SIDE OF FLAT BED: UNABLE
MOBILITY SCORE: 10
MOVING TO AND FROM BED TO CHAIR: UNABLE
TURNING FROM BACK TO SIDE WHILE IN FLAT BAD: UNABLE

## 2019-05-08 NOTE — CARE PLAN
Problem: Safety  Goal: Will remain free from falls  Outcome: MET Date Met: 05/07/19  Bed locked in lowest position with top two siderails up. Call light within reach. Treaded socks on. Educated patient to call for help before getting up    Problem: Respiratory:  Goal: Respiratory status will improve  Outcome: MET Date Met: 05/07/19  Patient currently on room air

## 2019-05-08 NOTE — THERAPY
"Physical Therapy Treatment completed.   Bed Mobility:  Supine to Sit: Minimal Assist  Transfers: Sit to Stand: Minimal Assist  Gait: Level Of Assist: Refused  Plan of Care: Will benefit from Physical Therapy 3 times per week  Discharge Recommendations: Equipment: Will Continue to Assess for Equipment Needs. Post-acute therapy Recommend inpatient transitional care services for continued physical therapy services.        See \"Rehab Therapy-Acute\" Patient Summary Report for complete documentation.     Pt was pleasant and agreeable asking to sit EOB upon arrival. Pt required Christi using therapist hand to pull her trunk to EOB. Once here pt engaging in conversation and required Christi to hold self at middle due to uneven bed surface. Encouraged pt to stand or try walking. Pt politely declining, reporting \"no, I just want to sit here\", than she proceeded to lay back supine. Therefore tx session was more limited today. Will continue to follow while in house.   "

## 2019-05-08 NOTE — PROGRESS NOTES
· 2 RN skin check complete.  · Devices in place SCDs and glasses.  · Skin assessed under devices.  · Bruising on bilateral upper extremities and abdomen. Scabbing on bilateral lower extremities. Blanchable redness to heels, wound on back, redness on bridge of nose from glasses.  · The following interventions in place: Mepilex on back wound, pillows in place for support and positioning, Q2H turns

## 2019-05-08 NOTE — THERAPY
"Speech Language Therapy dysphagia treatment completed.   Functional Status:  Pt seen for dysphagia f/u and reporsitioned upright in bed, with mult cues to maintain active participation.  Pt verbalized twice, \"shut up, get out of my face,\" with clear vocal quality.  Minimal tastes of pureed entrees but pt consumed 4 oz of ntl with ability to hold cup with standby assist.  Not eating to meet nutritional needs at this meal, but without overt s/s of penetration/aspiration.  Diet modified as pt is preferring liquids to purees.  RN aware of status.  Recommendations: Diet changed to NTFL with strategies updated; benefits from items in cup with assisted self fdg.   Plan of Care: Will benefit from Speech Therapy 2 times per week  Post-Acute Therapy: Discharge to a transitional care facility for continued skilled therapy services.    See \"Rehab Therapy-Acute\" Patient Summary Report for complete documentation.     "

## 2019-05-08 NOTE — PROGRESS NOTES
Hospital Medicine Daily Progress Note    Date of Service  5/8/2019    Chief Complaint  89 y.o. female admitted 3/25/2019 with left femoral neck fracture after ground level fall.    Hospital Course    89 y.o. female who presented 3/25/2019 with past medical history of Parkinson's disease, dementia, osteoporosis, paroxysmal atrial fibrillation, admitted with fall/syncope apparently patient was found on the floor by caretaker. EMS found patient in Afib/RVR. Due to concern for possible sepsis patient had pan CT that showed left-sided neck femoral fracture but no other acute findings, patient received broad-spectrum antibiotics. Hip fracture was surgically repaired on 3/26 with Dr. Brian. She remains confused at her baseline. Awaiting long-term placement. 4/27 Long conversation with DPOA regarding nursing efforts to encourage nutrition, re-orient, as well as provided medical update. I asked CM to contact DPOA as well regarding updates to the discharge plan.      Interval Problem Update  Femoral neck fracture-she is much more awake and alert today.  She says that her pain is well controlled denies any issues with constipation.  Remains adherent to her medication.    No behavioral issues this morning.    Consultants/Specialty  Orthopedic surgery  Palliative care    Code Status  DNR/DNI    Disposition  Long term Snf with hospice  Pending medicaid and JENNIFER    Review of Systems  Review of Systems   Unable to perform ROS: Dementia        Physical Exam  Temp:  [36.3 °C (97.4 °F)-36.9 °C (98.4 °F)] 36.3 °C (97.4 °F)  Pulse:  [62-69] 63  Resp:  [16-17] 16  BP: (102-135)/(48-52) 135/48  SpO2:  [92 %-93 %] 93 %    Physical Exam   Constitutional: She appears well-developed. She appears lethargic. She appears cachectic.   More engaging today   Eyes: Pupils are equal, round, and reactive to light. Conjunctivae are normal. No scleral icterus.   Cardiovascular: Normal rate and regular rhythm.    Pulmonary/Chest: Effort normal and  breath sounds normal. No stridor. No respiratory distress.   Abdominal: Soft. She exhibits no distension.   Musculoskeletal: She exhibits no tenderness.   Left hip wound cdi-remains with no interval change today  Neurovascularly intact   Neurological: She appears lethargic. She is disoriented. No cranial nerve deficit. Coordination normal.   Oriented only to self and place; much more aware this morning   Skin: Skin is warm and dry. She is not diaphoretic. No erythema.   Psychiatric: Cognition and memory are impaired.   Nursing note and vitals reviewed.      Fluids    Intake/Output Summary (Last 24 hours) at 05/08/19 1406  Last data filed at 05/07/19 2100   Gross per 24 hour   Intake              400 ml   Output                0 ml   Net              400 ml                              Assessment/Plan  * Closed fracture of neck of left femur (HCC)- (present on admission)   Assessment & Plan    After a fall, had CT hip showing Acute left femoral neck fracture   Patient underwent left hemiarthroplasty on 3/26/2019.  Continue PT/OT , currently a moderate assist for walker use and ambulation  Staples removed 4/9 and steri strips placed  Awaiting placement  -Pain appears well-controlled and continue multimodal pain therapy-no further adjustments at this time  -Difficult disposition remains       Dysphagia- (present on admission)   Assessment & Plan    Initially failed swallow evaluation.  Per POA, do not place a tube feeding.  Speech therapy, diet was advanced today     Late onset Alzheimer's disease without behavioral disturbance- (present on admission)   Assessment & Plan    -Behavior remains decently controlled at this time  4/26 Reached out to DPOA to discuss goals of care  -Continue Risperdal 0.5 mg every evening and Seroquel as needed, no current behavioral issues     Inadequate oral intake   Assessment & Plan    5/3 Supplement with IV fluids  POLST recommended re: appropriateness of IV fluids       Moderate  protein-calorie malnutrition (HCC)- (present on admission)   Assessment & Plan    Encourage oral intake with puree diet, no tube feeds per POA request  Poor PO intake-no obvious change at this time and unlikely to improve       Paroxysmal atrial fibrillation (HCC)- (present on admission)   Assessment & Plan    Patient was found to be on A. fib with RVR, echocardiogram indicate LV EF of 75% and mild MR and TR.  Continue on low-dose metoprolol hold for low blood pressure, no further adjustments needed at this time     Nontraumatic tear of skin- (present on admission)   Assessment & Plan    T-spine  RN to turn Q2  Caution with lifting  Encourage PO     Normocytic anemia- (present on admission)   Assessment & Plan    Check intermittently     Hypokalemia- (present on admission)   Assessment & Plan    Replaced        Hypercholesterolemia- (present on admission)   Assessment & Plan    No evidence for statin therapy at this age         VTE prophylaxis: heparin

## 2019-05-09 LAB
ANION GAP SERPL CALC-SCNC: 7 MMOL/L (ref 0–11.9)
BUN SERPL-MCNC: 36 MG/DL (ref 8–22)
CALCIUM SERPL-MCNC: 9.3 MG/DL (ref 8.5–10.5)
CHLORIDE SERPL-SCNC: 105 MMOL/L (ref 96–112)
CO2 SERPL-SCNC: 28 MMOL/L (ref 20–33)
CREAT SERPL-MCNC: 0.58 MG/DL (ref 0.5–1.4)
GLUCOSE SERPL-MCNC: 99 MG/DL (ref 65–99)
POTASSIUM SERPL-SCNC: 4.4 MMOL/L (ref 3.6–5.5)
SODIUM SERPL-SCNC: 140 MMOL/L (ref 135–145)

## 2019-05-09 PROCEDURE — 700102 HCHG RX REV CODE 250 W/ 637 OVERRIDE(OP): Performed by: HOSPITALIST

## 2019-05-09 PROCEDURE — 36415 COLL VENOUS BLD VENIPUNCTURE: CPT

## 2019-05-09 PROCEDURE — 700112 HCHG RX REV CODE 229: Performed by: HOSPITALIST

## 2019-05-09 PROCEDURE — 99232 SBSQ HOSP IP/OBS MODERATE 35: CPT | Performed by: INTERNAL MEDICINE

## 2019-05-09 PROCEDURE — 80048 BASIC METABOLIC PNL TOTAL CA: CPT

## 2019-05-09 PROCEDURE — 700111 HCHG RX REV CODE 636 W/ 250 OVERRIDE (IP): Performed by: HOSPITALIST

## 2019-05-09 PROCEDURE — A9270 NON-COVERED ITEM OR SERVICE: HCPCS | Performed by: HOSPITALIST

## 2019-05-09 PROCEDURE — 770006 HCHG ROOM/CARE - MED/SURG/GYN SEMI*

## 2019-05-09 PROCEDURE — 700102 HCHG RX REV CODE 250 W/ 637 OVERRIDE(OP): Performed by: NURSE PRACTITIONER

## 2019-05-09 PROCEDURE — A9270 NON-COVERED ITEM OR SERVICE: HCPCS | Performed by: NURSE PRACTITIONER

## 2019-05-09 RX ADMIN — POLYETHYLENE GLYCOL 3350 1 PACKET: 17 POWDER, FOR SOLUTION ORAL at 17:13

## 2019-05-09 RX ADMIN — HEPARIN SODIUM 5000 UNITS: 5000 INJECTION, SOLUTION INTRAVENOUS; SUBCUTANEOUS at 14:17

## 2019-05-09 RX ADMIN — DOCUSATE SODIUM 100 MG: 100 CAPSULE, LIQUID FILLED ORAL at 17:13

## 2019-05-09 RX ADMIN — METOPROLOL TARTRATE 12.5 MG: 25 TABLET ORAL at 17:13

## 2019-05-09 RX ADMIN — HEPARIN SODIUM 5000 UNITS: 5000 INJECTION, SOLUTION INTRAVENOUS; SUBCUTANEOUS at 21:40

## 2019-05-09 RX ADMIN — HEPARIN SODIUM 5000 UNITS: 5000 INJECTION, SOLUTION INTRAVENOUS; SUBCUTANEOUS at 05:39

## 2019-05-09 RX ADMIN — SENNOSIDES AND DOCUSATE SODIUM 1 TABLET: 8.6; 5 TABLET ORAL at 05:34

## 2019-05-09 RX ADMIN — RISPERIDONE 0.5 MG: 0.5 TABLET ORAL at 17:13

## 2019-05-09 RX ADMIN — METOPROLOL TARTRATE 12.5 MG: 25 TABLET ORAL at 05:38

## 2019-05-09 NOTE — DISCHARGE PLANNING
Anticipated Discharge Disposition: Unknown at this time.    Action: LSW spoke with PFA who stated that pt has too much money to qualify for Medicaid FFS.     Barriers to Discharge: Placement    Plan: Discuss pt with supervisor and IDT.

## 2019-05-09 NOTE — PROGRESS NOTES
Hospital Medicine Daily Progress Note    Date of Service  5/9/2019    Chief Complaint  89 y.o. female admitted 3/25/2019 with left femoral neck fracture after ground level fall.    Hospital Course    89 y.o. female who presented 3/25/2019 with past medical history of Parkinson's disease, dementia, osteoporosis, paroxysmal atrial fibrillation, admitted with fall/syncope apparently patient was found on the floor by caretaker. EMS found patient in Afib/RVR. Due to concern for possible sepsis patient had pan CT that showed left-sided neck femoral fracture but no other acute findings, patient received broad-spectrum antibiotics. Hip fracture was surgically repaired on 3/26 with Dr. Brian. She remains confused at her baseline. Awaiting long-term placement. 4/27 Long conversation with DPOA regarding nursing efforts to encourage nutrition, re-orient, as well as provided medical update. I asked CM to contact DPOA as well regarding updates to the discharge plan.      Interval Problem Update  Femoral neck fracture-patient is doing well with minimal pain.  No adjustment to pain medications today and still remains a difficult disposition.    No behavioral issues this morning.    Consultants/Specialty  Orthopedic surgery  Palliative care    Code Status  DNR/DNI    Disposition  Long term Snf with hospice  Pending medicaid and JENNIFER    Review of Systems  Review of Systems   Unable to perform ROS: Dementia        Physical Exam  Temp:  [36.3 °C (97.3 °F)-36.4 °C (97.6 °F)] 36.4 °C (97.5 °F)  Pulse:  [68-76] 68  Resp:  [16-17] 17  BP: (122-152)/(59-70) 138/62  SpO2:  [93 %-95 %] 94 %    Physical Exam   Constitutional: She appears well-developed. She appears lethargic. She appears cachectic.   Remains engaged   Eyes: Pupils are equal, round, and reactive to light. Conjunctivae are normal. No scleral icterus.   Cardiovascular: Normal rate and regular rhythm.    Pulmonary/Chest: Effort normal and breath sounds normal. No stridor. No  respiratory distress.   Abdominal: Soft. There is no tenderness.   Musculoskeletal: She exhibits no edema.   Left hip wound cdi-no changes in surgical site today   Neurological: She appears lethargic. She is disoriented. No cranial nerve deficit.   Oriented only to self and place; much more aware this morning   Skin: Skin is warm and dry. She is not diaphoretic. No erythema.   Psychiatric: Cognition and memory are impaired.   Nursing note and vitals reviewed.      Fluids    Intake/Output Summary (Last 24 hours) at 05/09/19 1557  Last data filed at 05/09/19 0900   Gross per 24 hour   Intake              440 ml   Output                0 ml   Net              440 ml     Recent Labs      05/09/19   0424   SODIUM  140   POTASSIUM  4.4   CHLORIDE  105   CO2  28   GLUCOSE  99   BUN  36*   CREATININE  0.58   CALCIUM  9.3                          Assessment/Plan  * Closed fracture of neck of left femur (HCC)- (present on admission)   Assessment & Plan    After a fall, had CT hip showing Acute left femoral neck fracture   Patient underwent left hemiarthroplasty on 3/26/2019.  Continue PT/OT , currently a moderate assist for walker use and ambulation  Staples removed 4/9 and steri strips placed  Awaiting placement  -Pain appears well-controlled and continue multimodal pain therapy-continue current course of medication  -Difficult disposition remains       Dysphagia- (present on admission)   Assessment & Plan    Initially failed swallow evaluation.  Per POA, do not place a tube feeding.  Speech therapy, diet was advanced today     Late onset Alzheimer's disease without behavioral disturbance- (present on admission)   Assessment & Plan    -Behavior remains quite well at this time  4/26 Reached out to DPOA to discuss goals of care  -Continue Risperdal 0.5 mg every evening and Seroquel as needed, no current behavioral issues     Inadequate oral intake   Assessment & Plan    5/3 Supplement with IV fluids  POLST recommended re:  appropriateness of IV fluids       Moderate protein-calorie malnutrition (HCC)- (present on admission)   Assessment & Plan    Encourage oral intake with puree diet, no tube feeds per POA request  Poor PO intake-no obvious change at this time and unlikely to improve       Paroxysmal atrial fibrillation (HCC)- (present on admission)   Assessment & Plan    Patient was found to be on A. fib with RVR, echocardiogram indicate LV EF of 75% and mild MR and TR.  Continue on low-dose metoprolol hold for low blood pressure, no further adjustments needed at this time     Nontraumatic tear of skin- (present on admission)   Assessment & Plan    T-spine  RN to turn Q2  Caution with lifting  Encourage PO     Normocytic anemia- (present on admission)   Assessment & Plan    Check intermittently     Hypokalemia- (present on admission)   Assessment & Plan    Replaced        Hypercholesterolemia- (present on admission)   Assessment & Plan    No evidence for statin therapy at this age         VTE prophylaxis: heparin

## 2019-05-09 NOTE — DISCHARGE PLANNING
Anticipated Discharge Disposition: SNF with Hospice    Action: LSW spoke with PFA to inquire where the process is from switching QMB to Medicaid FFS. PFA stated they received referral yesterday and have not started the process yet. LSW requested a phone call be placed to Father Jossue to discuss the change with him and start the process.     LSW received a phone call from EPS worker, Ann ([phone]). LSW provided Ann with an update on pt's discharge plan. Ann said that pt's long term care worker will be Kiko ([phone]). Ann requested a call be made to her and Kiko when pt is discharging from the hospital.     LSW received a phone call from Melissa with [facility] ([phone]). LOBOW left her a message.     Barriers to Discharge: Insurance switch    Plan: Awaiting initiation of QMB to Medicaid FFS.

## 2019-05-09 NOTE — CARE PLAN
Problem: Safety  Goal: Will remain free from injury    Intervention: Provide assistance with mobility  Safety and fall precautions in place, bed alarm in use, call light within reach, bed locked in lowest position, non-skid socks in place, clutter-free environment, DME in bathroom. Patient does not demonstrate understanding of understanding and does not use light appropriately for assistance.      Problem: Skin Integrity  Goal: Risk for impaired skin integrity will decrease    Intervention: Assess risk factors for impaired skin integrity and/or pressure ulcers  Assessed for signs of skin breakdown. Encouraging increased mobility and repositioning to prevent development of pressure ulcers. Patient repositions self frequently in bed. Waffle overlay in place.

## 2019-05-09 NOTE — PROGRESS NOTES
· 2 RN skin check complete with TERRY Alvarez per Faizan Score of 14.  · Devices in place: SCDs.  · Skin assessed under devices: no breakdown noted.  · Confirmed pressure ulcers found on: n/a.  · Findings: redness on bridge of nose from glasses, bruising to bilateral upper extremities and abdomen, scabbing on bilateral lower extremities, blanchable redness to heels, skin tear to back with mepilex in place.  · The following interventions in place: encouraging increased repositioning, waffle overlay in place, applied new mepilex to back, perineal care for incontinence.

## 2019-05-09 NOTE — CARE PLAN
Problem: Communication  Goal: The ability to communicate needs accurately and effectively will improve  Outcome: MET Date Met: 05/08/19  Pt uses call light as necessary to contact medical staff    Problem: Safety  Goal: Will remain free from falls  Outcome: DISCHARGED-GOAL NOT MET Date Met: 05/08/19  Bed locked in lowest position with top two siderails up. Call light within reach. Treaded socks on. Educated patient to call for help before getting up    Problem: Respiratory:  Goal: Respiratory status will improve  Outcome: MET Date Met: 05/08/19  Patient on room air    Problem: Pain Management  Goal: Pain level will decrease to patient's comfort goal  Outcome: MET Date Met: 05/08/19  Patient denies pain at this time

## 2019-05-10 PROCEDURE — A9270 NON-COVERED ITEM OR SERVICE: HCPCS | Performed by: HOSPITALIST

## 2019-05-10 PROCEDURE — 770006 HCHG ROOM/CARE - MED/SURG/GYN SEMI*

## 2019-05-10 PROCEDURE — 700102 HCHG RX REV CODE 250 W/ 637 OVERRIDE(OP): Performed by: HOSPITALIST

## 2019-05-10 PROCEDURE — 99232 SBSQ HOSP IP/OBS MODERATE 35: CPT | Performed by: INTERNAL MEDICINE

## 2019-05-10 PROCEDURE — 700102 HCHG RX REV CODE 250 W/ 637 OVERRIDE(OP): Performed by: NURSE PRACTITIONER

## 2019-05-10 PROCEDURE — A9270 NON-COVERED ITEM OR SERVICE: HCPCS | Performed by: NURSE PRACTITIONER

## 2019-05-10 PROCEDURE — 86480 TB TEST CELL IMMUN MEASURE: CPT

## 2019-05-10 PROCEDURE — 36415 COLL VENOUS BLD VENIPUNCTURE: CPT

## 2019-05-10 RX ORDER — ACETAMINOPHEN 500 MG
500-1000 TABLET ORAL EVERY 6 HOURS PRN
Qty: 30 TAB | Refills: 1 | Status: SHIPPED | OUTPATIENT
Start: 2019-05-10

## 2019-05-10 RX ORDER — PSEUDOEPHEDRINE HCL 30 MG
100 TABLET ORAL 2 TIMES DAILY
Qty: 60 CAP | Refills: 1 | Status: SHIPPED | OUTPATIENT
Start: 2019-05-10

## 2019-05-10 RX ORDER — FLUDROCORTISONE ACETATE 0.1 MG/1
0.1 TABLET ORAL DAILY
Qty: 30 TAB | Refills: 1 | Status: SHIPPED | OUTPATIENT
Start: 2019-05-10

## 2019-05-10 RX ORDER — RISPERIDONE 0.5 MG/1
0.5 TABLET ORAL 2 TIMES DAILY
Qty: 60 TAB | Refills: 3 | Status: SHIPPED | OUTPATIENT
Start: 2019-05-10

## 2019-05-10 RX ORDER — QUETIAPINE FUMARATE 50 MG/1
50 TABLET, FILM COATED ORAL EVERY 6 HOURS PRN
Qty: 60 TAB | Refills: 3 | Status: SHIPPED | OUTPATIENT
Start: 2019-05-10

## 2019-05-10 RX ORDER — OXYBUTYNIN CHLORIDE 5 MG/1
5 TABLET, EXTENDED RELEASE ORAL DAILY
Qty: 30 TAB | Refills: 1 | Status: SHIPPED | OUTPATIENT
Start: 2019-05-10

## 2019-05-10 RX ORDER — ONDANSETRON 4 MG/1
4 TABLET, ORALLY DISINTEGRATING ORAL EVERY 4 HOURS PRN
Qty: 10 TAB | Refills: 0 | Status: SHIPPED | OUTPATIENT
Start: 2019-05-10

## 2019-05-10 RX ORDER — OXYCODONE HYDROCHLORIDE 5 MG/1
2.5 TABLET ORAL
Qty: 10 TAB | Refills: 0 | Status: SHIPPED | OUTPATIENT
Start: 2019-05-10 | End: 2019-05-13

## 2019-05-10 RX ADMIN — METOPROLOL TARTRATE 12.5 MG: 25 TABLET ORAL at 18:47

## 2019-05-10 RX ADMIN — RISPERIDONE 0.5 MG: 0.5 TABLET ORAL at 18:46

## 2019-05-10 RX ADMIN — QUETIAPINE FUMARATE 50 MG: 25 TABLET ORAL at 03:43

## 2019-05-10 NOTE — CARE PLAN
Problem: Safety  Goal: Will remain free from injury  Outcome: PROGRESSING SLOWER THAN EXPECTED  Bed alarm in place. Treaded socks in place. Patient encouraged to use call light to alert staff of needs before getting out of bed. Hourly rounding in place.     Problem: Venous Thromboembolism (VTW)/Deep Vein Thrombosis (DVT) Prevention:  Goal: Patient will participate in Venous Thrombosis (VTE)/Deep Vein Thrombosis (DVT)Prevention Measures  Outcome: PROGRESSING AS EXPECTED  SCDs in place.

## 2019-05-10 NOTE — PROGRESS NOTES
· 2 RN skin check complete with TERRY Joe per Faizan Score of 12 .  · Devices in place: SCDs.  · Skin assessed under devices: no breakdown noted.  · Confirmed pressure ulcers found on: n/a.  · Findings: redness on bridge of nose from glasses, bruising to bilateral upper extremities and abdomen, scabbing on bilateral lower extremities, blanchable redness to heels, skin tear to back with mepilex in place.  · The following interventions in place: encouraging increased repositioning, waffle overlay in place, applied new mepilex to back, perineal care for incontinence.

## 2019-05-10 NOTE — PROGRESS NOTES
Hospital Medicine Daily Progress Note    Date of Service  5/10/2019    Chief Complaint  89 y.o. female admitted 3/25/2019 with left femoral neck fracture after ground level fall.    Hospital Course    89 y.o. female who presented 3/25/2019 with past medical history of Parkinson's disease, dementia, osteoporosis, paroxysmal atrial fibrillation, admitted with fall/syncope apparently patient was found on the floor by caretaker. EMS found patient in Afib/RVR. Due to concern for possible sepsis patient had pan CT that showed left-sided neck femoral fracture but no other acute findings, patient received broad-spectrum antibiotics. Hip fracture was surgically repaired on 3/26 with Dr. Brian. She remains confused at her baseline. Awaiting long-term placement. 4/27 Long conversation with DPOA regarding nursing efforts to encourage nutrition, re-orient, as well as provided medical update. I asked CM to contact DPOA as well regarding updates to the discharge plan.      Interval Problem Update  Femoral neck fracture-no acute issues overnight.  Patient is doing well and is sleeping this morning.    Patient did require 50 mg of Seroquel for agitation at 3 AM this morning.    No behavioral issues this morning.    Consultants/Specialty  Orthopedic surgery  Palliative care    Code Status  DNR/DNI    Disposition  Long term Snf with hospice  Pending medicaid and JENNIFER    Review of Systems  Review of Systems   Unable to perform ROS: Dementia        Physical Exam  Temp:  [36.4 °C (97.6 °F)-36.8 °C (98.3 °F)] 36.4 °C (97.6 °F)  Pulse:  [72-95] 73  Resp:  [17-18] 18  BP: (125-157)/(61-71) 157/69  SpO2:  [94 %-98 %] 94 %    Physical Exam   Constitutional: She appears well-developed. She appears lethargic. She appears cachectic.   Sleeping this morning   Eyes: Pupils are equal, round, and reactive to light. Conjunctivae are normal. Right eye exhibits no discharge. Left eye exhibits no discharge.   Cardiovascular: Normal rate and regular  rhythm.    Pulmonary/Chest: Effort normal and breath sounds normal. No stridor. No respiratory distress.   Abdominal: Soft. She exhibits no distension.   Musculoskeletal: She exhibits no edema.   Left hip wound cdi-, no interval clinical change noted   Neurological: She appears lethargic. She is disoriented. Coordination normal.   Oriented only to self and place; remains engaged   Skin: Skin is warm and dry. She is not diaphoretic. No erythema.   Psychiatric: Cognition and memory are impaired.   Nursing note and vitals reviewed.      Fluids  No intake or output data in the 24 hours ending 05/10/19 1106  Recent Labs      05/09/19   0424   SODIUM  140   POTASSIUM  4.4   CHLORIDE  105   CO2  28   GLUCOSE  99   BUN  36*   CREATININE  0.58   CALCIUM  9.3                          Assessment/Plan  * Closed fracture of neck of left femur (HCC)- (present on admission)   Assessment & Plan    After a fall, had CT hip showing Acute left femoral neck fracture   Patient underwent left hemiarthroplasty on 3/26/2019.  Continue PT/OT , currently a moderate assist for walker use and ambulation  Staples removed 4/9 and steri strips placed  Awaiting placement  -Pain appears well-controlled, continue current medication regimen  -Difficult disposition ensues       Dysphagia- (present on admission)   Assessment & Plan    Initially failed swallow evaluation.  Per POA, do not place a tube feeding.  Speech therapy, diet was advanced today     Late onset Alzheimer's disease without behavioral disturbance- (present on admission)   Assessment & Plan    -Had an episode of agitation this morning refusing turns and required Seroquel 50 mg x 1.  Otherwise doing well  4/26 Reached out to DPOA to discuss goals of care  -Continue Risperdal 0.5 mg every evening and Seroquel as needed, no change to current medication regimen     Inadequate oral intake   Assessment & Plan    5/3 Supplement with IV fluids  POLST recommended re: appropriateness of IV  fluids       Moderate protein-calorie malnutrition (HCC)- (present on admission)   Assessment & Plan    Encourage oral intake with puree diet, no tube feeds per POA request  Poor PO intake-no obvious change at this time and unlikely to improve       Paroxysmal atrial fibrillation (HCC)- (present on admission)   Assessment & Plan    Patient was found to be on A. fib with RVR, echocardiogram indicate LV EF of 75% and mild MR and TR.  Continue on low-dose metoprolol hold for low blood pressure, no further adjustments needed at this time     Nontraumatic tear of skin- (present on admission)   Assessment & Plan    T-spine  RN to turn Q2  Caution with lifting  Encourage PO     Normocytic anemia- (present on admission)   Assessment & Plan    Check intermittently     Hypokalemia- (present on admission)   Assessment & Plan    Replaced        Hypercholesterolemia- (present on admission)   Assessment & Plan    No evidence for statin therapy at this age         VTE prophylaxis: heparin

## 2019-05-10 NOTE — CARE PLAN
Problem: Venous Thromboembolism (VTW)/Deep Vein Thrombosis (DVT) Prevention:  Goal: Patient will participate in Venous Thrombosis (VTE)/Deep Vein Thrombosis (DVT)Prevention Measures  Outcome: PROGRESSING AS EXPECTED  Monitor for anticoagulation complications and contraindications.  Ensure patient wears scds when in bed if ordered.  Encourage frequent ambulation if appropriate.  Administer medication as ordered and seen in eMAR.    Problem: Skin Integrity  Goal: Risk for impaired skin integrity will decrease  Outcome: PROGRESSING AS EXPECTED  Frequent position changes. Education provided to patient on importance of Q2 hour turns. Monitor areas of redness and skin breakdown.

## 2019-05-10 NOTE — DISCHARGE PLANNING
Anticipated Discharge Disposition: [facility]    Action: JORGE received a call from JOSE/Pastor Marcum, he stated that he found a safety deposit box of the pt that also had his name listed as one of the users.  The safety deposit box contained enough money for placement of the pt.  Juan Marcum would like the pt to transition to [facility].     JORGE spoke to Josette #[phone], she is helping  Jossue and the  owner, Slade Gallardo #[phone].  Josette is requesting quant gold to be ordered.  She stated that the results do not have to be in order for the pt to transfer.  She stated that the owner, Slade will do an physical assessment to see if she has any TB symptoms.  Josette is requesting the H&P, quant gold, AD, and hard scripts.     JORGE faxed PCS form to [facility].  She stated pt's [facility] transport won't be covered due to her insurance.      JORGE requested that [facility] set up [facility].  JORGE updated bedside RN and MD.     JORGE spoke to TERRY Navarro with [facility].  She is coordinating delivering pt's equipment to the .  She is requesting that transport be set up at 1300.     Barriers to Discharge: None    Plan: F/U on [facility].

## 2019-05-10 NOTE — PROGRESS NOTES
Bedside shift report received from night RN.  Assumed care at 0715, patient sleeping in bed, belongings and call light within reach, dressing drain visualized CDI. Needs met at this time.    Reviewed current POC with family present. POC review with CNA including vital sign frequency/drains/mobility.  Labs, notes, orders reviewed at this time.

## 2019-05-11 VITALS
HEART RATE: 68 BPM | WEIGHT: 95.46 LBS | TEMPERATURE: 97.8 F | RESPIRATION RATE: 16 BRPM | OXYGEN SATURATION: 94 % | HEIGHT: 62 IN | BODY MASS INDEX: 17.57 KG/M2 | SYSTOLIC BLOOD PRESSURE: 143 MMHG | DIASTOLIC BLOOD PRESSURE: 58 MMHG

## 2019-05-11 LAB
GAMMA INTERFERON BACKGROUND BLD IA-ACNC: 0.06 IU/ML
M TB IFN-G BLD-IMP: NEGATIVE
M TB IFN-G CD4+ BCKGRND COR BLD-ACNC: 0.05 IU/ML
MITOGEN IGNF BCKGRD COR BLD-ACNC: >10 IU/ML
QFT TB2 - NIL TBQ2: 0.03 IU/ML

## 2019-05-11 PROCEDURE — 99239 HOSP IP/OBS DSCHRG MGMT >30: CPT | Performed by: INTERNAL MEDICINE

## 2019-05-11 PROCEDURE — 700111 HCHG RX REV CODE 636 W/ 250 OVERRIDE (IP): Performed by: HOSPITALIST

## 2019-05-11 RX ADMIN — HEPARIN SODIUM 5000 UNITS: 5000 INJECTION, SOLUTION INTRAVENOUS; SUBCUTANEOUS at 06:02

## 2019-05-11 NOTE — PROGRESS NOTES
Pt d/c to [facility]. Pt escorted via medical transportation. Pt has belongings with her and d/c summary. POA updated. All questions answered

## 2019-05-11 NOTE — CARE PLAN
Problem: Safety  Goal: Will remain free from injury    Intervention: Provide assistance with mobility  Bed locked and in lowest position. Call light within reach       Problem: Discharge Barriers/Planning  Goal: Patient's continuum of care needs will be met    Intervention: Assess potential discharge barriers on admission and throughout hospital stay  POC discussed with pt. Pt to be going to SNF today

## 2019-05-11 NOTE — CARE PLAN
Problem: Infection  Goal: Will remain free from infection  Outcome: PROGRESSING AS EXPECTED  Standard precautions in place. Hand hygiene performed before and after patient contact.    Problem: Skin Integrity  Goal: Risk for impaired skin integrity will decrease  Outcome: PROGRESSING AS EXPECTED  Frequent checks for incontinence in place. Turns every 2 hours in place. Pillows in use for support and positioning.

## 2019-05-11 NOTE — DISCHARGE INSTRUCTIONS
Discharge Instructions    Discharged to other by medical transportation with self. Discharged via wheelchair, hospital escort: Yes.  Special equipment needed: Not Applicable    Be sure to schedule a follow-up appointment with your primary care doctor or any specialists as instructed.     Discharge Plan:     No future appointments.  Arsenio Lewis M.D.  [address] [place] [address]  [phone]     Schedule an appointment as soon as possible for a visit in 1 week  As needed, For wound re-check    Diet Plan: Discussed  Activity Level: Discussed  Confirmed Follow up Appointment: Patient to Call and Schedule Appointment (pt healed)  Confirmed Symptoms Management: Discussed  Medication Reconciliation Updated: Yes  Influenza Vaccine Indication: Not indicated: Previously immunized this influenza season and > 8 years of age    I understand that a diet low in cholesterol, fat, and sodium is recommended for good health. Unless I have been given specific instructions below for another diet, I accept this instruction as my diet prescription.   Diet: pureed thickened liquids    Special Instructions: Discharge instructions for the Orthopedic Patient    Follow up with Primary Care Physician within 2 weeks of discharge to home, regarding:  Review of medications and diagnostic testing.  Surveillance for medical complications.  Workup and treatment of osteoporosis, if appropriate.     -Is this a Joint Replacement patient? Yes   Total Joint Hip Replacement Discharge Instructions    Pain  - The goal is to slowly wean off the prescription pain medicine.  - Ice can be used for pain control.  20 minutes at a time is recommended, and never directly against your skin or incision.  - Most patients are off the pain pills by 3 weeks; others may require a low level of pain medications for many months. If your pain continues to be severe, follow up with your physician.  Infection  Deep hip joint infections that require removal of the  prostheses occur in less than 0.1% of patients. Lesser infections in the skin (cellulites) are more common and much more easily treated.  - Keep the incision as clean and dry as possible.  - Always wash your hands before touching your incision.  - Skin infections tend to develop around 7-10 days after surgery, most can be treated with oral antibiotics.  - Dental Care should be delayed for 3 months after surgery, your surgeon recommends taking a dose of antibiotics 1 hour prior to any dental procedure.  After 2 years, most surgeons recommend antibiotics only before an extensive procedure.  Ask your surgeon what he recommends.  - Signs and symptoms of infection can include:  low grade fever, redness, pain, swelling and drainage from your incision.  Notify your surgeon immediately if you develop any of these symptoms.  Post op Disturbances  - Bowel habits - constipation is extremely common and is caused by a combination of anesthesia, lack of mobility and pain medicine.  Use stool softeners or laxatives if necessary. It is important not to ignore this problem, as bowel obstructions can be a serious complication after joint replacement surgery.  - Mood/Energy Level - Many patients experience a lack of energy and endurance for up to 2-3 months after surgery.  Some may also feel down and can even become depressed.  This is likely due to the postoperative anemia, change in activity level, lack of sleep, pain medicine and just the emotional reaction to the surgery itself that is a big disruption in a person’s life.  This usually passes.  If symptoms persist, follow up with your primary physician.  - Returning to work - Your surgeon will give you more specific instructions.  Generally, if you work a sedentary job requiring little standing or walking, most patients may return within 2-6 weeks.  Manual labor jobs involving walking, lifting and standing may take 3-4 months.  Your surgeon’s office can provide a release to  part-time or light duty work early on in your recovery and progress you to full duty as able.  - Driving - You can begin driving an automatic shift car in 4 to 8 weeks, provided you are no longer taking narcotic pain medication. If you have a stick-shift car and your right hip was replaced, do not begin driving until your doctor says you can.   - Avoiding falls -  throw rugs and tack down loose carpeting.  Be aware of floor hazards such as pets, small objects or uneven surfaces.   -  Airport Metal Detectors - The sensitivity of metal detectors varies and it is likely that your prosthesis will cause an alarm. Inform the  that you have an artificial joint.  Diet  - Resume your normal diet as tolerated.  - It is important to achieve a healthy nutritional status by eating a well balanced diet on a regular basis.  - Your physician may recommend that you take iron and vitamin supplements.   - Continue to drink plenty of fluids.  Shower/Bathing  - You may shower as soon as you get home from the hospital unless otherwise instructed.  - Keep your incision out of water.  To keep the incision dry when showering, cover it with a plastic bag or plastic wrap.  - Pat incision dry if it gets wet.  Don’t rub.  - Do not submerge in a bath until staples are out and the incision is completely healed. (Approximately 6-8 weeks after surgery).  Dressing Change:  Procedure (if recommended by your physician)  - Wash hands.  - Open all dressing change materials.  - Remove old dressing and discard.  - Inspect incision for redness, increase in clear drainage, yellow/green drainage, odor and surrounding skin hot to touch.  -  ABD (large gauze) pad by one corner and lay over the incision.  Be careful not to touch the inside of the dressing that will lay over the incision.  - Secure in place as instructed (Ace wrap or tape).    Swelling/Bruising  - Swelling is normal after hip replacement and can involve the thigh,  knee, calf and foot.  - Swelling can last from 3-6 months.  - Elevate your leg higher than your heart while reclining.  The first week you are home you should elevate your leg an equal amount of time, as you are active.    - Anti-inflammatory pills can be taken once you have stopped the blood thinners.  - The swelling is usually worse after you go home since you are upright for longer periods of time.  - Bruising is common and can involve the entire leg including the thigh, calf and even foot.  Bruising often does not appear until after you arrive home and it can be quite dramatic- purple, black, green.  The bruising you can see is not usually concerning and will subside without any treatment.      Blood Clot Prevention  Blood clots in the legs and the less common, but frightening, clots that travel to the lungs are a real focus of our preventative. Most patients are at standard risk for them, but those patients who are at higher risk include people who have had previous clots, a family history of clotting, smoking, diabetes, obesity, advanced age, use of estrogen and a sedentary lifestyle.    - Signs of blood clots in legs - Swelling in thigh, calf or ankle that does not go down with elevation.  Pain, heat and tenderness in calf, back of calf or groin area.  NOTE: blood clots can occur in either leg.  - You have been receiving anticoagulant therapy (blood thinners) in the hospital and you may be instructed to continue at home depending on your risk factors.  - Your risk for developing a clot continues for up to 2-3 months after surgery.  You should avoid prolonged sitting and dehydration during that time (long air trips and car trips).  If you do take a trip during this time, please get up and move around every 1- 1.5 hours.  - If you are prescribed blood thinning medication for home, follow instructions as directed. (Handouts provided if applicable).      Activity    Once you get home, you should stay active. The  shen is not to overdo it! While you can expect some good days and some bad days, you should notice a gradual improvement over time you should notice a gradual improvement and a gradual increase in your endurance over the next 6 to 12 months.    - Weight Bearing - If you have undergone cemented or hybrid hip replacement, you can put some weight on the leg immediately using a cane or walker, and you should continue to use some support for 4 to 6 weeks to help the muscles recover.   - Sleeping Positions - Sleep on your back with your legs slightly apart or on your side with a regular pillow between your knees. Be sure to use the pillow for at least 6 weeks, or until your doctor says you can do without it. Sleeping on your stomach should be all right  - Sitting - For at least the first 3 months, sit only in chairs that have arms. Do not sit on low chairs, low stools, or reclining chairs. Do not cross your legs at the knees. The physical therapist will show you how to sit and stand from a chair, keeping your affected leg out in front of you. Get up and move around on a regular basis--at least once every hour.  - Walking - Walk as much as you like once your doctor gives you the go-ahead, but remember that walking is no substitute for your prescribed exercises. Walking with a pair of trekking poles is helpful and adds as much as 40% to the exercise you get when you walk  - Therapy may be needed in some cases, to strengthen your muscles and improve your gait (walking pattern).  This decision will be made at your post-operative appointment.  Follow your therapist recommended post-operative exercises (handout provided by Therapist).  - Swimming is also recommended; you can begin as soon as the sutures have been removed and the wound is healed, approximately 6 to 8 weeks after surgery. Using a pair of training fins may make swimming a more enjoyable and effective exercise.  - Other activities - Lower impact activities are  preferred.  If you have specific questions, consult your Surgeon.    - Sexual activity - Your surgeon can tell you when it should be safe to resume sexual activity.      When to Call the Doctor   Call the physician if:   - Fever over 100.5? F  - Increased pain, drainage, redness, odor or heat around the incision area  - Shaking chills  - Increased knee pain with activity and rest  - Increased pain in calf, tenderness or redness above or below the knee  - Increased swelling of calf, ankle, foot  - Sudden increased shortness of breath, sudden onset of chest pain, localized chest pain with coughing  - Incision opening  Or, if there are any questions or concerns about medications or care.       Depression / Suicide Risk    As you are discharged from this [facility] facility, it is important to learn how to keep safe from harming yourself.    Recognize the warning signs:  · Abrupt changes in personality, positive or negative- including increase in energy   · Giving away possessions  · Change in eating patterns- significant weight changes-  positive or negative  · Change in sleeping patterns- unable to sleep or sleeping all the time   · Unwillingness or inability to communicate  · Depression  · Unusual sadness, discouragement and loneliness  · Talk of wanting to die  · Neglect of personal appearance   · Rebelliousness- reckless behavior  · Withdrawal from people/activities they love  · Confusion- inability to concentrate     If you or a loved one observes any of these behaviors or has concerns about self-harm, here's what you can do:  · Talk about it- your feelings and reasons for harming yourself  · Remove any means that you might use to hurt yourself (examples: pills, rope, extension cords, firearm)  · Get professional help from the community (Mental Health, Substance Abuse, psychological counseling)  · Do not be alone:Call your Safe Contact- someone whom you trust who will be there for you.  · Call your local CRISIS  HOTLINE 440-5072 or 511-463-5989  · Call your local Children's Mobile Crisis Response Team [place] (252) 978-7187 or www.[place]  · Call the toll free National Suicide Prevention Hotlines   · National Suicide Prevention Lifeline 468-982-KOFP (5235)  · [employer] Line Network 800-SUICIDE (881-3984)

## 2019-05-11 NOTE — DISCHARGE SUMMARY
Discharge Summary    CHIEF COMPLAINT ON ADMISSION  Chief Complaint   Patient presents with   • Syncope   • Blood in Vomit   • Atrial Fibrillation       Reason for Admission  EMS     Admission Date  3/25/2019    CODE STATUS  DNAR/DNI    HPI & HOSPITAL COURSE  This is a 89 y.o. female here with above medical issues.  Patient was initially admitted after she was found on the floor by her caretaker was found to be in atrial fibrillation with RVR and possible sepsis and during pan CT in the ER was found to have a left neck femoral fracture.  She was admitted to the hospital and placed on broad-spectrum antibiotics and sepsis protocol and orthopedic surgery was consulted.  She underwent surgical repair on March 26, 2019 and did quite well with the surgery.    Her atrial fibrillation resolved and she converted back to sinus rhythm and was not placed on blood thinners given advanced age of 89 years old.  Patient did have not having any active infection antibiotic's were discontinued.    Given patient's advanced age and multiple medical core morbidities palliative care was consulted and the patient was agreeable to hospice.  She stayed in her hospital for a long period time given scant financial resources.  However though 1 of her friends found an old safety deposit box it was assigned her with enough money to allow her to to go to a group home where she can receive hospice and she is agreeable to this now.    She has quite advanced dementia and her behavior is well controlled with Risperdal and Seroquel as needed.    Therefore, she is discharged in fair and stable condition to home with close outpatient follow-up.    The patient met 2-midnight criteria for an inpatient stay at the time of discharge.    Discharge Date  May 11, 2019    FOLLOW UP ITEMS POST DISCHARGE  Follow-up with the hospice physician at the group home    DISCHARGE DIAGNOSES  Principal Problem:    Closed fracture of neck of left femur (HCC) POA: Yes  Active  Problems:    Late onset Alzheimer's disease without behavioral disturbance POA: Yes    Dysphagia POA: Yes    Hypercholesterolemia POA: Yes    Hypokalemia POA: Yes    Prediabetes POA: Yes    Normocytic anemia POA: Yes    Nontraumatic tear of skin POA: Yes    Paroxysmal atrial fibrillation (HCC) POA: Yes    Moderate protein-calorie malnutrition (HCC) POA: Yes    Inadequate oral intake POA: No  Resolved Problems:    Fall POA: Yes    Leukocytosis POA: Yes    Lactic acidosis POA: Yes    Delirium POA: Yes    Hypernatremia POA: Yes      FOLLOW UP  No future appointments.  Arsenio Lewis M.D.  [address] [facility] [address]  [phone]    Schedule an appointment as soon as possible for a visit in 1 week  As needed, For wound re-check      MEDICATIONS ON DISCHARGE     Medication List      START taking these medications      Instructions   docusate sodium 100 MG Caps   Take 100 mg by mouth 2 Times a Day.  Dose:  100 mg     metoprolol 25 MG Tabs  Commonly known as:  LOPRESSOR   Take 0.5 Tabs by mouth 2 Times a Day.  Dose:  12.5 mg     ondansetron 4 MG Tbdp  Commonly known as:  ZOFRAN ODT   Take 1 Tab by mouth every four hours as needed (give PO if IV route is unavailable. May give per feeding tube.).  Dose:  4 mg     oxyCODONE immediate-release 5 MG Tabs  Commonly known as:  ROXICODONE   Take 0.5 Tabs by mouth every 3 hours as needed for up to 3 days.  Dose:  2.5 mg     quetiapine 50 MG tablet  Commonly known as:  SEROQUEL   Take 1 Tab by mouth every 6 hours as needed (agitation).  Dose:  50 mg     risperiDONE 0.5 MG Tabs  Commonly known as:  RISPERDAL   Take 1 Tab by mouth 2 Times a Day.  Dose:  0.5 mg        CONTINUE taking these medications      Instructions   acetaminophen 500 MG Tabs  Commonly known as:  TYLENOL   Take 1-2 Tabs by mouth every 6 hours as needed.  Dose:  500-1000 mg     fludrocortisone 0.1 MG Tabs  Commonly known as:  FLORINEF   Take 1 Tab by mouth every day.  Dose:  0.1 mg     oxybutynin SR 5 MG  Tb24  Commonly known as:  DITROPAN-XL   Take 1 Tab by mouth every day.  Dose:  5 mg            Allergies  No Known Allergies    DIET  Orders Placed This Encounter   Procedures   • Diet Order Regular, Full Liquid     Standing Status:   Standing     Number of Occurrences:   1     Order Specific Question:   Diet:     Answer:   Regular [1]     Order Specific Question:   Diet:     Answer:   Full Liquid [11]     Order Specific Question:   Consistency/Fluid modifications:     Answer:   Nectar Thick [2]     Order Specific Question:   Miscellaneous modifications:     Answer:   SLP - Deliver to Nursing Station [22]     Order Specific Question:   Miscellaneous modifications:     Answer:   SLP - 1:1 Supervision by Nursing [21]       ACTIVITY  As tolerated.  Weight bearing as tolerated    CONSULTATIONS  Orthopedic surgery  Palliative care    PROCEDURES  EC-ECHOCARDIOGRAM COMPLETE W/O CONT   Final Result      DX-PELVIS-1 OR 2 VIEWS   Final Result      Left hip arthroplasty.      CT-CHEST,ABDOMEN,PELVIS WITH   Final Result      1.  Acute left femoral neck fracture is identified.      2.  Fluid-containing structures in the pelvis bilaterally are identified. These could represent urinary bladder diverticula versus ovarian cysts or adnexal cysts.      3.  No solid organ injury identified.      4.  Small renal cysts are noted.      5.  Gallbladder appears distended and may contain a gallstone.      6.  Emphysema and areas of fibrosis are noted in the chest.      7.  Extensive atherosclerosis is noted throughout the aorta and its branch vessels.         CT-HEAD W/O   Final Result         NO ACUTE ABNORMALITIES ARE NOTED ON CT SCAN OF THE HEAD.      Findings are consistent with atrophy.  Decreased attenuation in the periventricular white matter likely indicates microvascular ischemic disease.      DX-FEMUR-2+ LEFT   Final Result      Left femoral neck fracture with displacement      DX-PELVIS-1 OR 2 VIEWS   Final Result      1.  Acute  left femoral neck fracture with displacement      2.  Right superior and inferior pubic rami fracture, acuity indeterminate      DX-CHEST-PORTABLE (1 VIEW)   Final Result      1.  Interstitial densities which could be fibrosis or edema      2.  Air lucency projecting over the left lung base which could be a contusion or much less likely etiology such as diaphragmatic injury. CT recommended for further assessment        See above.    LABORATORY  Lab Results   Component Value Date    SODIUM 140 05/09/2019    POTASSIUM 4.4 05/09/2019    CHLORIDE 105 05/09/2019    CO2 28 05/09/2019    GLUCOSE 99 05/09/2019    BUN 36 (H) 05/09/2019    CREATININE 0.58 05/09/2019        Lab Results   Component Value Date    WBC 7.6 05/03/2019    HEMOGLOBIN 12.9 05/03/2019    HEMATOCRIT 39.5 05/03/2019    PLATELETCT 168 05/03/2019        Total time of the discharge process exceeds 34 minutes.

## 2019-05-11 NOTE — PROGRESS NOTES
Unable to complete 2 RN skin check at this time because patient is sleeping and refusing assessment.

## 2019-05-11 NOTE — DISCHARGE PLANNING
JORGE confirmed with [facility] that pt does not have transport benefits.  JORGE spoke to Kaylyn with [facility], she will still schedule pt's transport at 1300.  JORGE updated RN, TERRY Navarro with [place] and left a VM for (JOSE) Pastor Marcum.

## 2021-01-14 DIAGNOSIS — Z23 NEED FOR VACCINATION: ICD-10-CM

## 2021-12-21 NOTE — PROGRESS NOTES
Hospital Medicine Daily Progress Note    Date of Service  4/9/2019    Chief Complaint  89 y.o. female admitted 3/25/2019 with left femoral neck fracture after ground level fall.    Hospital Course    89 y.o. female who presented 3/25/2019 with past medical history of Parkinson's disease, dementia, osteoporosis, paroxysmal atrial fibrillation, admitted with fall/syncope apparently patient was found on the floor by caretaker. EMS found patient in Afib/RVR. Due to concern for possible sepsis patient had pan CT that showed left-sided neck femoral fracture but no other acute findings, patient received broad-spectrum antibiotics. Admitted to telemetry, ortho consulted for hip fracture.         Interval Problem Update  The patient remains confused at her baseline.    Consultants/Specialty  Orthopedic surgery  Palliative care    Code Status  DNR/DNI    Disposition  snf    Review of Systems  Review of Systems   Unable to perform ROS: Dementia        Physical Exam  Temp:  [36.1 °C (97 °F)-36.8 °C (98.3 °F)] 36.2 °C (97.1 °F)  Pulse:  [68-90] 69  Resp:  [16-17] 16  BP: (114-142)/(48-90) 122/90  SpO2:  [90 %-95 %] 90 %    Physical Exam   Constitutional: No distress.   HENT:   Mouth/Throat: Oropharynx is clear and moist.   Eyes: Conjunctivae are normal. No scleral icterus.   Neck: Neck supple. No tracheal deviation present.   Cardiovascular: Normal rate and regular rhythm.    Pulmonary/Chest: Effort normal and breath sounds normal. No respiratory distress. She has no wheezes.   Abdominal: Soft. Bowel sounds are normal. She exhibits no distension.   Musculoskeletal: She exhibits no edema.   Neurological: She is alert.   Skin: Skin is warm and dry. She is not diaphoretic. No erythema.   Psychiatric: Cognition and memory are impaired. She expresses impulsivity.   Nursing note and vitals reviewed.      Fluids    Intake/Output Summary (Last 24 hours) at 04/09/19 1406  Last data filed at 04/09/19 1300   Gross per 24 hour   Intake               480 ml   Output                0 ml   Net              480 ml       Laboratory  Recent Labs      04/08/19   0552   WBC  8.1   RBC  3.58*   HEMOGLOBIN  11.1*   HEMATOCRIT  34.7*   MCV  96.9   MCH  31.0   MCHC  32.0*   RDW  54.2*   PLATELETCT  251   MPV  9.9     Recent Labs      04/08/19   0552   SODIUM  141   POTASSIUM  4.0   CHLORIDE  109   CO2  28   GLUCOSE  100*   BUN  30*   CREATININE  0.61   CALCIUM  8.8                   Imaging  EC-ECHOCARDIOGRAM COMPLETE W/O CONT   Final Result      DX-PELVIS-1 OR 2 VIEWS   Final Result      Left hip arthroplasty.      CT-CHEST,ABDOMEN,PELVIS WITH   Final Result      1.  Acute left femoral neck fracture is identified.      2.  Fluid-containing structures in the pelvis bilaterally are identified. These could represent urinary bladder diverticula versus ovarian cysts or adnexal cysts.      3.  No solid organ injury identified.      4.  Small renal cysts are noted.      5.  Gallbladder appears distended and may contain a gallstone.      6.  Emphysema and areas of fibrosis are noted in the chest.      7.  Extensive atherosclerosis is noted throughout the aorta and its branch vessels.         CT-HEAD W/O   Final Result         NO ACUTE ABNORMALITIES ARE NOTED ON CT SCAN OF THE HEAD.      Findings are consistent with atrophy.  Decreased attenuation in the periventricular white matter likely indicates microvascular ischemic disease.      DX-FEMUR-2+ LEFT   Final Result      Left femoral neck fracture with displacement      DX-PELVIS-1 OR 2 VIEWS   Final Result      1.  Acute left femoral neck fracture with displacement      2.  Right superior and inferior pubic rami fracture, acuity indeterminate      DX-CHEST-PORTABLE (1 VIEW)   Final Result      1.  Interstitial densities which could be fibrosis or edema      2.  Air lucency projecting over the left lung base which could be a contusion or much less likely etiology such as diaphragmatic injury. CT recommended for further  assessment           Assessment/Plan  * Closed fracture of neck of left femur (HCC)- (present on admission)   Assessment & Plan    After a fall, had CT hip showing Acute left femoral neck fracture is identified, Ortho has been consulted  Patient underwent left hemiarthroplasty on 3/26/2019.  PT/OT.  Placement pending SNF referral placed       Dysphagia- (present on admission)   Assessment & Plan    Initially failed swallow evaluation.  Per POA, patient does not want any tube feeding.  Speech therapy, continue pureed diet     Late onset Alzheimer's disease without behavioral disturbance- (present on admission)   Assessment & Plan    Continue low dose Risperdal.      Moderate protein-calorie malnutrition (HCC)- (present on admission)   Assessment & Plan    Encourage oral intake, to tube feeds per POA request     Paroxysmal atrial fibrillation (HCC)- (present on admission)   Assessment & Plan    Patient was found to be on A. fib with RVR, echocardiogram indicate LV EF of 75% and mild MR and TR.  Continue on low-dose metoprolol hold for low blood pressure     Normocytic anemia- (present on admission)   Assessment & Plan    Stable hemoglobin level     Hypokalemia- (present on admission)   Assessment & Plan    Replaced        Hypercholesterolemia- (present on admission)   Assessment & Plan    No evidence for statin therapy at this age          VTE prophylaxis: heparin       Yes Trilobed Flap Text: The defect edges were debeveled with a #15 scalpel blade.  Given the location of the defect and the proximity to free margins a trilobed flap was deemed most appropriate.  Using a sterile surgical marker, an appropriate trilobed flap drawn around the defect.    The area thus outlined was incised deep to adipose tissue with a #15 scalpel blade.  The skin margins were undermined to an appropriate distance in all directions utilizing iris scissors.

## 2022-03-09 NOTE — DISCHARGE PLANNING
Agency/Facility Name: [facility]  Spoke To: Judith  Outcome: Declined, patient only has Co and Ded SUJEY, not FFS.      COPD EDUCATION by COPD CLINICAL EDUCATOR  3/9/2022  at  8:46 AM by Purvi Mcintyre, RRT     Patient interviewed by COPD education team.  Patient unable to participate in full program.  A short intervention has been conducted.  A comprehensive packet including information about COPD, types of treatments to manage their disease and safe home Oxygen usage was provided and reviewed with patient at the bedside.  Prior to admission he denied using Oxygen. He quit smoking >2 years ago. We reviewed his medications as noted below. He is happy with his PCP and will make a follow up appointment.  Requested a outpatient Pulmonary referral for discharge. IP Pulmonary is seeing patient    COPD Screen  COPD Risk Screening  Do you have a history of COPD?: No  COPD Population Screener  During the past 4 weeks, how much did you feel short of breath?: None/Little of the time  Do you ever cough up any mucus or phlegm?: No/only with occasional colds or infections  In the past 12 months, you do less than you used to because of your breathing problems: Disagree/unsure  Have you smoked at least 100 cigarettes in your entire life?: Yes  How old are you?: 60+  COPD Screening Score: 4  COPD Coordinator Not Recommended: Yes    COPD Assessment  COPD Clinical Specialists ONLY  COPD Education Initiated: Yes--Short Intervention (IP Pulm to see; hx COPD weaning [facility]. He does not have a cell phone-0- RPM -lives in  in [place])  DME Company: none  DME Equipment Type: denies  Physician Name: VIOLETTA STOLL M.D.    [facility]'s pt to schedule  Pulmonologist Name: none but had prior at [facility] gave information on Pulmonary groups acceting Insurance  Referrals Initiated: Yes  Pulmonary Rehab: Yes (by admit team)  Smoking Cessation: No  Palliative Care: Yes (they saw pt)  Home Health Care:  (TBD anticipate SNF vs HH at MD)  [facility] Community Outreach: N/A  [facility] Group: N/A  [facility]: Yes (provided information and will send on MD)  Is  "this a COPD exacerbation patient?: Yes  $ Demo/Eval of SVN's, MDI's and Aerosols: Yes (provided and reviewed use)  (OP) Pulmonary Function Testing:  (says he's had befor in [place] none on file or available)    PFT Results    No results found for: PFT    Meds to Beds  Would the patient like to opt in for Bedside Medication Delivery at Discharge?: Yes, interested     MY COPD ACTION PLAN     It is recommended that patients and physicians /healthcare providers complete this action plan together. This plan should be discussed at each physician visit and updated as needed.    The green, yellow and red zones show groups of symptoms of COPD. This list of symptoms is not comprehensive, and you may experience other symptoms. In the \"Actions\" column, your healthcare provider has recommended actions for you to take based on your symptoms.    Patient Name: Juan Manuel Bass   YOB: 1950   Last Updated on:     Green Zone:  I am doing well today Actions   •  Usual activitiy and exercise level •  Take daily medications   •  Usual amounts of cough and phlegm/mucus •  Use oxygen as prescribed   •  Sleep well at night •  Continue regular exercise/diet plan   •  Appetite is good •  At all times avoid cigarette smoke, inhaled irritants     Daily Medications (these medications are taken every day):   Fluticasone and Umeclidinium and Vilanterol (Trelogy) 1 Puff Once daily     Additional Information:  Please remember to RINSE MOUTH after taking this medicine    Yellow Zone:  I am having a bad day or a COPD flare Actions   •  More breathless than usual •  Continue daily medications   •  I have less energy for my daily activities •  Use quick relief inhaler as ordered   •  Increased or thicker phlegm/mucus •  Use oxygen as prescribed   •  Using quick relief inhaler/nebulizer more often •  Get plenty of rest   •  Swelling of ankles more than usual •  Use pursed lip breathing   •  More coughing than usual •  At all times avoid " "cigarette smoke, inhaled irritants   •  I feel like I have a \"chest cold\"   •  Poor sleep and my symptoms woke me up   •  My appetite is not good   •  My medicine is not helping    •  Call provider immediately if symptoms don’t improve     Continue daily medications, add rescue medications:   Albuterol 2 Puffs         Medications to be used during a flare up, (as Discussed with Provider):           Additional Information:  Take as directed by your Doctor and use the spacer    Red Zone:  I need urgent medical care Actions   •  Severe shortness of breath even at rest •  Call 911 or seek medical care immediately   •  Not able to do any activity because of breathing    •  Fever or shaking chills    •  Feeling confused or very drowsy     •  Chest pains    •  Coughing up blood              "

## 2023-07-06 NOTE — PROGRESS NOTES
Hospital Medicine Daily Progress Note    Date of Service  3/28/2019    Chief Complaint  89 y.o. female admitted 3/25/2019 with falls/syncope and hip fracture    Hospital Course    89 y.o. female who presented 3/25/2019 with past medical history of Parkinson's disease, dementia, osteoporosis, paroxysmal atrial fibrillation, admitted with fall/syncope apparently patient was found on the floor by caretaker. EMS found patient in Afib/RVR. Due to concern for possible sepsis patient had pan CT that showed left-sided neck femoral fracture but no other acute findings, patient received broad-spectrum antibiotics. Admitted to telemetry, ortho consulted for hip fracture.      Interval Problem Update  3/26--Going for hip repair this AM, will need PT/OT/SLP afterwards  3/27: Laying in bed.  Lethargic.  Open her eyes to verbal stimulus but then drift back to sleep quickly.  No acute distress noted.  3/28: More awake today but confused and agitated.  Requiring physical restraints.  Disoriented to time and place.  Inattentive.  Consultants/Specialty  Ortho    Code Status  DNR/DNI    Disposition  Pending, anticipate rehab needs    Review of Systems  Review of Systems   Unable to perform ROS: Dementia      Physical Exam  Temp:  [36.2 °C (97.1 °F)-36.7 °C (98.1 °F)] 36.7 °C (98.1 °F)  Pulse:  [64-75] 64  Resp:  [15-17] 16  BP: (132-149)/(44-56) 149/53  SpO2:  [96 %-100 %] 100 %    Physical Exam   Constitutional: She appears lethargic. She appears cachectic. No distress.   Frail.   HENT:   Head: Normocephalic and atraumatic.   Eyes: Pupils are equal, round, and reactive to light. Conjunctivae are normal. No scleral icterus.   Neck: Normal range of motion. No tracheal deviation present.   Cardiovascular: Normal rate, regular rhythm and normal heart sounds.  Exam reveals no gallop and no friction rub.    Pulmonary/Chest: Effort normal and breath sounds normal. No respiratory distress.   Abdominal: Soft. She exhibits no distension. There  Patient calling to schedule her 5 year colonoscopy recall. Last Procedure, Date, MD:  colonoscopy 06/20/2018  Last Diagnosis:  small colon polyps  Recalled (mth/yrs): 5 years  Sedation Used Previously:  Fentanyl: 75 mcg IV  Midazolam: 6 mg IV in divided doses. Last Prep Used (if known):  Colyte  Quality Of Prep (if known): very good  Anticoagulants:no  Diuretics: no  Diabetic Med's (PO/Injectables): no  Weight loss Med's:no  Iron/Herbal/Multivitamin Supplements (RX/OTC):vitamin d  Marijuana/Vaping/CBD:no  Height & Weight:5'3/126  BMI:22.33  Hx of Cardiac/CVA Issues/(MI/Stroke):no  Devices Pacemaker/Defibrillator/Stents:no  Respiratory Issues/Oxygen Use/CAMILLA/COPD:no  Issues w/ Anesthesia:no    Symptoms (Y/N): none    Special Comments/Notes:    Please advise on orders and prep. Thank you! Operative Report signed by Lorrie Geiger MD at 6/20/2018 10:06 AM  Version 1 of 1  Author: Lorrie Geiger MD Service: Gastroenterology Author Type: Physician   Filed: 6/20/2018 10:06 AM Date of Service: 6/20/2018  9:08 AM Status: Signed   : Lorrie Geiger MD (Physician)   [facility] Endoscopy Report        Date of Procedure:  06/20/18        Preoperative Diagnosis:  1. Personal history of adenomatous colon polyps  2. Colorectal cancer screening        Postoperative Diagnosis:  Small colon polyps        Procedure:    Colonoscopy with polypectomy        Surgeon:  Raj Ervin M.D. Anesthesia:  Fentanyl: 75 mcg IV  Midazolam: 6 mg IV in divided doses. Conscious/moderate sedation was administered under my direct supervision by the endoscopy RN  Conscious Sedation time:  34 minutes  Cecal withdrawal time:  20 minutes        Brief History: This is a 61year old female who has a history of adenomatous colon polyps. Her last colonoscopy was 5-1/2 years prior. She has had no new lower gastrointestinal tract symptoms, signs or family history.         Technique:  After informed consent, the patient was placed in the left lateral recumbent position. Digital rectal examination revealed no palpable intraluminal abnormalities. An Olympus variable stiffness 190 series HD colonoscope was inserted into the rectum and advanced under direct vision by following the lumen to the sigmoid colon. The patient was made supine and the instrument advanced to the terminal ileum. The colon was examined upon withdrawal in the supine position. Findings:  The preparation of the colon was very good. The terminal ileum was examined for 5 cm and visually normal.  The ileocecal valve was well preserved. The visualized colonic mucosa from the cecum to the anal verge was normal with an intact vascular pattern. There were #3 small polyps present within the colon which were removed as follows:     1. In the proximal transverse colon there were #2 3-4 mm sessile polyps which were cold snare excised and retrieved via suction. 2.  In the mid sigmoid colon there was a 5 mm hyperplastic appearing sessile polyp which was cold snare excised and retrieved via suction. Inspection of all the above sites revealed no evidence of ongoing bleeding. There were no other colonic polyps, definite diverticula, mass lesions, vascular anomalies or signs of inflammation seen. Retroflexion in the right colon and rectum revealed no abnormalities. The procedure was well tolerated without immediate complication. Impression:  1. Small colon polyps  2. Otherwise normal colonoscopy to the terminal ileum     Recommendations:  1. Follow-up biopsy results. 2.  Probable follow-up colonoscopy in 5 years. 3.  Dicyclomine prescription was refilled at the patient's request.           Billie Radford MD  6/20/2018           Final Diagnosis:      A. Transverse colon polyps; biopsy:  Hyperplastic polyp     B.  Sigmoid colon polyp; biopsy:  Hyperplastic polyp is no tenderness.   Musculoskeletal: She exhibits no tenderness or deformity.   Neurological: She appears lethargic. She is disoriented.   Skin: Skin is warm and dry. She is not diaphoretic.   Psychiatric: Her affect is labile. She is agitated and actively hallucinating. Cognition and memory are impaired. She expresses impulsivity. She exhibits abnormal recent memory and abnormal remote memory.   Nursing note and vitals reviewed.    Fluids    Intake/Output Summary (Last 24 hours) at 03/28/19 1600  Last data filed at 03/28/19 1300   Gross per 24 hour   Intake              150 ml   Output              400 ml   Net             -250 ml     Laboratory  Recent Labs      03/26/19   0030  03/28/19   0424   WBC  14.8*  7.2   RBC  4.60  3.25*   HEMOGLOBIN  13.8  10.1*   HEMATOCRIT  41.5  31.4*   MCV  90.2  96.6   MCH  30.0  31.1   MCHC  33.3*  32.2*   RDW  43.3  47.6   PLATELETCT  205  164   MPV  10.4  10.6     Recent Labs      03/26/19   0030  03/28/19   0424   SODIUM  141  142   POTASSIUM  4.1  3.3*   CHLORIDE  104  107   CO2  30  31   GLUCOSE  120*  88   BUN  19  33*   CREATININE  0.76  0.73   CALCIUM  9.6  8.7             Recent Labs      03/26/19   0030   TRIGLYCERIDE  61   HDL  64   LDL  74       Imaging  DX-PELVIS-1 OR 2 VIEWS   Final Result      Left hip arthroplasty.      CT-CHEST,ABDOMEN,PELVIS WITH   Final Result      1.  Acute left femoral neck fracture is identified.      2.  Fluid-containing structures in the pelvis bilaterally are identified. These could represent urinary bladder diverticula versus ovarian cysts or adnexal cysts.      3.  No solid organ injury identified.      4.  Small renal cysts are noted.      5.  Gallbladder appears distended and may contain a gallstone.      6.  Emphysema and areas of fibrosis are noted in the chest.      7.  Extensive atherosclerosis is noted throughout the aorta and its branch vessels.         CT-HEAD W/O   Final Result         NO ACUTE ABNORMALITIES ARE NOTED ON CT  SCAN OF THE HEAD.      Findings are consistent with atrophy.  Decreased attenuation in the periventricular white matter likely indicates microvascular ischemic disease.      DX-FEMUR-2+ LEFT   Final Result      Left femoral neck fracture with displacement      DX-PELVIS-1 OR 2 VIEWS   Final Result      1.  Acute left femoral neck fracture with displacement      2.  Right superior and inferior pubic rami fracture, acuity indeterminate      DX-CHEST-PORTABLE (1 VIEW)   Final Result      1.  Interstitial densities which could be fibrosis or edema      2.  Air lucency projecting over the left lung base which could be a contusion or much less likely etiology such as diaphragmatic injury. CT recommended for further assessment      EC-ECHOCARDIOGRAM COMPLETE W/O CONT    (Results Pending)        Assessment/Plan  * Fall- (present on admission)   Assessment & Plan    Unclear etiology although patient does state that it was a mechanical fall, continue telemetry, patient probably was dehydrated her lactic acid was elevated on admission this has improved after fluid resuscitation.   Fall precautions.  PT/OT.     Closed fracture of neck of left femur (HCC)- (present on admission)   Assessment & Plan    After a fall, had CT hip showing Acute left femoral neck fracture is identified, Ortho has been consulted  Patient underwent left hemiarthroplasty on 3/26/2019.  PT/OT.  SNF pending.       Normocytic anemia- (present on admission)   Assessment & Plan    Monitor H&H.     Delirium- (present on admission)   Assessment & Plan    Patient is having agitation and confusion requiring physical restraints.  This likely combination of stress-induced on body from overall medical condition with potential adverse events of medications in a patient who has advanced dementia.  Avoid any anticholinergic medications or benzodiazepines.  Avoid any hypnotics or sedatives.  Utilize one-on-one sitter and avoid physical restraints.  Minimize  lines.  Avoid/discontinue Davis catheter as possible.     Lactic acidosis- (present on admission)   Assessment & Plan    Most likely due to dehydration, patient had pan CT that did not show signs of infection, lactic acid is improving after IV hydration, continue monitoring.  Resolved.     Hyperglycemia- (present on admission)   Assessment & Plan    Will check A1c     Hypokalemia- (present on admission)   Assessment & Plan    Replacing     Leukocytosis- (present on admission)   Assessment & Plan    Probably reactive, no signs of infection.  Trending down.     Paroxysmal atrial fibrillation (HCC)- (present on admission)   Assessment & Plan    Patient was found to be on A. fib with RVR, will get echocardiogram, started on low-dose metoprolol, patient most probably is not good candidate for anticoagulation due to risk of fall and age     Late onset Alzheimer's disease without behavioral disturbance- (present on admission)   Assessment & Plan    High risk for hospital-acquired delirium.  Avoid benzodiazepines and/or anti-cholinergic medications.  Avoid any sedatives or hypnotics.  Minimize lines.  Willfully catheter.  Avoid physical restraints.  Daily orientation     Hypercholesterolemia- (present on admission)   Assessment & Plan    Follow up lipid panel.      Plan of care discussed with multidisciplinary team during rounds.    VTE prophylaxis: scds.  Chemical anticoagulation per orthopedic.

## 2024-02-28 NOTE — DISCHARGE INSTRUCTIONS
Discharge Instructions    Discharged to home by taxi with friend. Discharged via wheelchair, hospital escort: Yes.  Special equipment needed: Not Applicable    Be sure to schedule a follow-up appointment with your primary care doctor or any specialists as instructed.     Discharge Plan:   Pneumococcal Vaccine Administered/Refused: Not given - Patient refused pneumococcal vaccine  Influenza Vaccine Indication: Patient Refuses    I understand that a diet low in cholesterol, fat, and sodium is recommended for good health. Unless I have been given specific instructions below for another diet, I accept this instruction as my diet prescription.   Other diet: regular    Special Instructions: None    · Is patient discharged on Warfarin / Coumadin?   No       Atrial Fibrillation  Introduction  Atrial fibrillation is a type of heartbeat that is irregular or fast (rapid). If you have this condition, your heart keeps quivering in a weird (chaotic) way. This condition can make it so your heart cannot pump blood normally. Having this condition gives a person more risk for stroke, heart failure, and other heart problems. There are different types of atrial fibrillation. Talk with your doctor to learn about the type that you have.  Follow these instructions at home:  · Take over-the-counter and prescription medicines only as told by your doctor.  · If your doctor prescribed a blood-thinning medicine, take it exactly as told. Taking too much of it can cause bleeding. If you do not take enough of it, you will not have the protection that you need against stroke and other problems.  · Do not use any tobacco products. These include cigarettes, chewing tobacco, and e-cigarettes. If you need help quitting, ask your doctor.  · If you have apnea (obstructive sleep apnea), manage it as told by your doctor.  · Do not drink alcohol.  · Do not drink beverages that have caffeine. These include coffee, soda, and tea.  · Maintain a healthy  When Should The Patient Follow-Up For Their Next Full-Body Skin Exam?: 3 Months weight. Do not use diet pills unless your doctor says they are safe for you. Diet pills may make heart problems worse.  · Follow diet instructions as told by your doctor.  · Exercise regularly as told by your doctor.  · Keep all follow-up visits as told by your doctor. This is important.  Contact a doctor if:  · You notice a change in the speed, rhythm, or strength of your heartbeat.  · You are taking a blood-thinning medicine and you notice more bruising.  · You get tired more easily when you move or exercise.  Get help right away if:  · You have pain in your chest or your belly (abdomen).  · You have sweating or weakness.  · You feel sick to your stomach (nauseous).  · You notice blood in your throw up (vomit), poop (stool), or pee (urine).  · You are short of breath.  · You suddenly have swollen feet and ankles.  · You feel dizzy.  · Your suddenly get weak or numb in your face, arms, or legs, especially if it happens on one side of your body.  · You have trouble talking, trouble understanding, or both.  · Your face or your eyelid droops on one side.  These symptoms may be an emergency. Do not wait to see if the symptoms will go away. Get medical help right away. Call your local emergency services (911 in the [place] Do not drive yourself to the hospital.   This information is not intended to replace advice given to you by your health care provider. Make sure you discuss any questions you have with your health care provider.  Document Released: 09/26/2009 Document Revised: 05/25/2017 Document Reviewed: 04/13/2016  © 2017 Elsevier    Depression / Suicide Risk    As you are discharged from this [facility] facility, it is important to learn how to keep safe from harming yourself.    Recognize the warning signs:  · Abrupt changes in personality, positive or negative- including increase in energy   · Giving away possessions  · Change in eating patterns- significant weight changes-  positive or negative  · Change in  Quality 137: Melanoma: Continuity Of Care - Recall System: Patient information entered into a recall system that includes: target date for the next exam specified AND a process to follow up with patients regarding missed or unscheduled appointments sleeping patterns- unable to sleep or sleeping all the time   · Unwillingness or inability to communicate  · Depression  · Unusual sadness, discouragement and loneliness  · Talk of wanting to die  · Neglect of personal appearance   · Rebelliousness- reckless behavior  · Withdrawal from people/activities they love  · Confusion- inability to concentrate     If you or a loved one observes any of these behaviors or has concerns about self-harm, here's what you can do:  · Talk about it- your feelings and reasons for harming yourself  · Remove any means that you might use to hurt yourself (examples: pills, rope, extension cords, firearm)  · Get professional help from the community (Mental Health, Substance Abuse, psychological counseling)  · Do not be alone:Call your Safe Contact- someone whom you trust who will be there for you.  · Call your local CRISIS HOTLINE 386-1148 or 397-030-6095  · Call your local Children's Mobile Crisis Response Team [place] (859) 979-0746 or www.[place]  · Call the toll free National Suicide Prevention Hotlines   · National Suicide Prevention Lifeline 173-890-DBSE (8708)  · National Hope Line Network 800-SUICIDE (822-3654)       Quality 397: Melanoma: Reporting: The pathology report includes a pT Category and statement on thickness and ulceration for pT1, mitotic rate. Detail Level: Detailed Quality 138: Melanoma: Coordination Of Care: A treatment plan was communicated to the physicians providing continuing care within one month of diagnosis outlining: diagnosis, tumor thickness and a plan for surgery or alternate care. Show Follow-Up Variable: Yes

## 2024-09-03 NOTE — FACE TO FACE
Face to Face Supporting Documentation - Home Health    The encounter with this patient was in whole or in part the primary reason for home health admission.    Date of encounter:   Patient:                    MRN:                       YOB: 2019  Rosalba Wagner  [id number]  1/16/1920     Home health to see patient for:  Skilled Nursing care for assessment, interventions & education, Medical social work consult, Physical Therapy evaluation and treatment and Occupational therapy evaluation and treatment    Skilled need for:  Recent Deterioration of Health Status Dementia and Medication Management Medication admin    Skilled nursing interventions to include:  Comment: Med admin and education    Homebound status evidenced by:  Needs the assistance of another person in order to leave the home. Leaving home requires a considerable and taxing effort. There is a normal inability to leave the home.    Community Physician to provide follow up care: Pcp Pt States None     Optional Interventions? No      I certify the face to face encounter for this home health care referral meets the CMS requirements and the encounter/clinical assessment with the patient was, in whole, or in part, for the medical condition(s) listed above, which is the primary reason for home health care. Based on my clinical findings: the service(s) are medically necessary, support the need for home health care, and the homebound criteria are met.  I certify that this patient has had a face to face encounter by myself.  BRIANNE Jimenez. - NPI: [id number]     Patient made aware of information below through [facility] message.

## (undated) DEVICE — PEN SKIN MARKER W/RULER - (50EA/BX)

## (undated) DEVICE — TUBING CLEARLINK DUO-VENT - C-FLO (48EA/CA)

## (undated) DEVICE — SUTURE 5 TI-CRON HOS-14 - (36/BX)

## (undated) DEVICE — SUTURE 2-0 VICRYL PLUS CT-1 - 8 X 18 INCH(12/BX)

## (undated) DEVICE — SUTURE GENERAL

## (undated) DEVICE — SENSOR SPO2 NEO LNCS ADHESIVE (20/BX) SEE USER NOTES

## (undated) DEVICE — LENS/HOOD FOR SPACESUIT - (32/PK) PEEL AWAY FACE

## (undated) DEVICE — PROTECTOR ULNA NERVE - (36PR/CA)

## (undated) DEVICE — MIXER BONE CEMENT REVOLUTION - W/FEMORAL PRESSURIZER (6/CA)

## (undated) DEVICE — MASK ANESTHESIA ADULT  - (100/CA)

## (undated) DEVICE — BLADE SAGITTAL SAW DUAL CUT 75.0 X 25.0MM (1/EA)

## (undated) DEVICE — PACK TOTAL HIP - (1/CA)

## (undated) DEVICE — KIT HIP PREP IM ENCHANCE TOTAL (5EA/BX)

## (undated) DEVICE — STOCKINET TUBULAR 6IN STERILE - 6 X 48YDS (25/CA)

## (undated) DEVICE — NEPTUNE 4 PORT MANIFOLD - (20/PK)

## (undated) DEVICE — GLOVE BIOGEL ECLIPSE  PF LATEX SIZE 6.5 (50PR/BX)

## (undated) DEVICE — DRESSING POST OP BORDER 4 X 10 (5EA/BX)

## (undated) DEVICE — SUTURE 2-0 VICRYL PLUS CTX - 8 X 18 INCH (12/BX)

## (undated) DEVICE — ELECTRODE 850 FOAM ADHESIVE - HYDROGEL RADIOTRNSPRNT (50/PK)

## (undated) DEVICE — GLOVE BIOGEL PI ORTHO SZ 8.5 PF LF (40/BX)

## (undated) DEVICE — KIT ROOM DECONTAMINATION

## (undated) DEVICE — WRAP COBAN SELF-ADHERENT 6 IN X  5YDS STERILE TAN (12/CA)

## (undated) DEVICE — HEAD HOLDER JUNIOR/ADULT

## (undated) DEVICE — SET EXTENSION WITH 2 PORTS (48EA/CA) ***PART #2C8610 IS A SUBSTITUTE*****

## (undated) DEVICE — GLOVE BIOGEL ECLIPSE PF LATEX SIZE 9.0

## (undated) DEVICE — LACTATED RINGERS INJ 1000 ML - (14EA/CA 60CA/PF)

## (undated) DEVICE — HANDPIECE 10FT INTPLS SCT PLS IRRIGATION HAND CONTROL SET (6/PK)

## (undated) DEVICE — SUCTION INSTRUMENT YANKAUER BULBOUS TIP W/O VENT (50EA/CA)

## (undated) DEVICE — PAD LAP STERILE 18 X 18 - (5/PK 40PK/CA)

## (undated) DEVICE — SODIUM CHL. IRRIGATION 0.9% 3000ML (4EA/CA 65CA/PF)

## (undated) DEVICE — SET LEADWIRE 5 LEAD BEDSIDE DISPOSABLE ECG (1SET OF 5/EA)

## (undated) DEVICE — SODIUM CHL IRRIGATION 0.9% 1000ML (12EA/CA)

## (undated) DEVICE — TIP INTPLS HFLO ML ORFC BTRY - (12/CS)  FOR SURGILAV

## (undated) DEVICE — DRAPE SPLIT ORTHOMAX (6/CA) **NO ALLOCATION OR ALLOTMENT, ORDER #4601 AS A SUB***

## (undated) DEVICE — CANISTER SUCTION 3000ML MECHANICAL FILTER AUTO SHUTOFF MEDI-VAC NONSTERILE LF DISP  (40EA/CA)

## (undated) DEVICE — DRAPE U ORTHOPEDIC - (10/BX)

## (undated) DEVICE — SUTURE 1 VICRYL PLUS CTX - 8 X 18 INCH (12/BX)

## (undated) DEVICE — GOWN WARMING STANDARD FLEX - (30/CA)

## (undated) DEVICE — KIT ANESTHESIA W/CIRCUIT & 3/LT BAG W/FILTER (20EA/CA)

## (undated) DEVICE — ELECTRODE DUAL RETURN W/ CORD - (50/PK)

## (undated) DEVICE — CHLORAPREP 26 ML APPLICATOR - ORANGE TINT(25/CA)